# Patient Record
Sex: FEMALE | Race: WHITE | URBAN - METROPOLITAN AREA
[De-identification: names, ages, dates, MRNs, and addresses within clinical notes are randomized per-mention and may not be internally consistent; named-entity substitution may affect disease eponyms.]

---

## 2017-06-15 ENCOUNTER — INPATIENT (INPATIENT)
Facility: HOSPITAL | Age: 22
LOS: 19 days | Discharge: HOME | End: 2017-07-05
Attending: INTERNAL MEDICINE

## 2017-06-15 DIAGNOSIS — F13.10 SEDATIVE, HYPNOTIC OR ANXIOLYTIC ABUSE, UNCOMPLICATED: ICD-10-CM

## 2017-06-15 DIAGNOSIS — F11.20 OPIOID DEPENDENCE, UNCOMPLICATED: ICD-10-CM

## 2017-06-15 DIAGNOSIS — F41.0 PANIC DISORDER [EPISODIC PAROXYSMAL ANXIETY]: ICD-10-CM

## 2017-06-15 DIAGNOSIS — F41.9 ANXIETY DISORDER, UNSPECIFIED: ICD-10-CM

## 2017-06-15 DIAGNOSIS — F12.10 CANNABIS ABUSE, UNCOMPLICATED: ICD-10-CM

## 2017-06-15 DIAGNOSIS — F93.0 SEPARATION ANXIETY DISORDER OF CHILDHOOD: ICD-10-CM

## 2017-06-28 DIAGNOSIS — F41.1 GENERALIZED ANXIETY DISORDER: ICD-10-CM

## 2017-06-28 DIAGNOSIS — F11.20 OPIOID DEPENDENCE, UNCOMPLICATED: ICD-10-CM

## 2017-06-28 DIAGNOSIS — F33.2 MAJOR DEPRESSIVE DISORDER, RECURRENT SEVERE WITHOUT PSYCHOTIC FEATURES: ICD-10-CM

## 2017-06-28 DIAGNOSIS — F17.200 NICOTINE DEPENDENCE, UNSPECIFIED, UNCOMPLICATED: ICD-10-CM

## 2017-06-28 DIAGNOSIS — F14.20 COCAINE DEPENDENCE, UNCOMPLICATED: ICD-10-CM

## 2017-07-12 DIAGNOSIS — F11.20 OPIOID DEPENDENCE, UNCOMPLICATED: ICD-10-CM

## 2017-07-12 DIAGNOSIS — F14.20 COCAINE DEPENDENCE, UNCOMPLICATED: ICD-10-CM

## 2017-07-12 DIAGNOSIS — R51 HEADACHE: ICD-10-CM

## 2017-07-12 DIAGNOSIS — F17.200 NICOTINE DEPENDENCE, UNSPECIFIED, UNCOMPLICATED: ICD-10-CM

## 2017-07-12 DIAGNOSIS — F41.1 GENERALIZED ANXIETY DISORDER: ICD-10-CM

## 2017-07-12 DIAGNOSIS — F33.2 MAJOR DEPRESSIVE DISORDER, RECURRENT SEVERE WITHOUT PSYCHOTIC FEATURES: ICD-10-CM

## 2017-07-12 DIAGNOSIS — R45.851 SUICIDAL IDEATIONS: ICD-10-CM

## 2017-07-12 DIAGNOSIS — Z51.89 ENCOUNTER FOR OTHER SPECIFIED AFTERCARE: ICD-10-CM

## 2017-07-26 ENCOUNTER — OUTPATIENT (OUTPATIENT)
Dept: OUTPATIENT SERVICES | Facility: HOSPITAL | Age: 22
LOS: 1 days | Discharge: HOME | End: 2017-07-26

## 2017-07-26 DIAGNOSIS — F41.9 ANXIETY DISORDER, UNSPECIFIED: ICD-10-CM

## 2017-07-26 DIAGNOSIS — F11.20 OPIOID DEPENDENCE, UNCOMPLICATED: ICD-10-CM

## 2017-07-26 DIAGNOSIS — F93.0 SEPARATION ANXIETY DISORDER OF CHILDHOOD: ICD-10-CM

## 2017-07-26 DIAGNOSIS — F41.0 PANIC DISORDER [EPISODIC PAROXYSMAL ANXIETY]: ICD-10-CM

## 2017-07-26 DIAGNOSIS — F12.10 CANNABIS ABUSE, UNCOMPLICATED: ICD-10-CM

## 2017-07-26 DIAGNOSIS — I49.9 CARDIAC ARRHYTHMIA, UNSPECIFIED: ICD-10-CM

## 2017-07-26 DIAGNOSIS — F13.10 SEDATIVE, HYPNOTIC OR ANXIOLYTIC ABUSE, UNCOMPLICATED: ICD-10-CM

## 2017-08-14 ENCOUNTER — EMERGENCY (EMERGENCY)
Facility: HOSPITAL | Age: 22
LOS: 0 days | Discharge: HOME | End: 2017-08-14
Admitting: PEDIATRICS
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
## 2017-08-14 DIAGNOSIS — F41.0 PANIC DISORDER [EPISODIC PAROXYSMAL ANXIETY]: ICD-10-CM

## 2017-08-14 DIAGNOSIS — Z79.899 OTHER LONG TERM (CURRENT) DRUG THERAPY: ICD-10-CM

## 2017-08-14 DIAGNOSIS — F12.10 CANNABIS ABUSE, UNCOMPLICATED: ICD-10-CM

## 2017-08-14 DIAGNOSIS — F13.10 SEDATIVE, HYPNOTIC OR ANXIOLYTIC ABUSE, UNCOMPLICATED: ICD-10-CM
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
## 2017-08-14 DIAGNOSIS — F11.20 OPIOID DEPENDENCE, UNCOMPLICATED: ICD-10-CM

## 2017-08-14 DIAGNOSIS — F41.9 ANXIETY DISORDER, UNSPECIFIED: ICD-10-CM

## 2017-08-14 DIAGNOSIS — F93.0 SEPARATION ANXIETY DISORDER OF CHILDHOOD: ICD-10-CM

## 2017-08-14 DIAGNOSIS — S61.212A LACERATION WITHOUT FOREIGN BODY OF RIGHT MIDDLE FINGER WITHOUT DAMAGE TO NAIL, INITIAL ENCOUNTER: ICD-10-CM

## 2017-08-14 DIAGNOSIS — Z23 ENCOUNTER FOR IMMUNIZATION: ICD-10-CM

## 2017-09-14 ENCOUNTER — OUTPATIENT (OUTPATIENT)
Dept: OUTPATIENT SERVICES | Facility: HOSPITAL | Age: 22
LOS: 1 days | Discharge: HOME | End: 2017-09-14

## 2017-09-14 DIAGNOSIS — I49.9 CARDIAC ARRHYTHMIA, UNSPECIFIED: ICD-10-CM

## 2017-09-14 DIAGNOSIS — F12.10 CANNABIS ABUSE, UNCOMPLICATED: ICD-10-CM

## 2017-09-14 DIAGNOSIS — F41.9 ANXIETY DISORDER, UNSPECIFIED: ICD-10-CM

## 2017-09-14 DIAGNOSIS — F93.0 SEPARATION ANXIETY DISORDER OF CHILDHOOD: ICD-10-CM

## 2017-09-14 DIAGNOSIS — F11.20 OPIOID DEPENDENCE, UNCOMPLICATED: ICD-10-CM

## 2017-09-14 DIAGNOSIS — F13.10 SEDATIVE, HYPNOTIC OR ANXIOLYTIC ABUSE, UNCOMPLICATED: ICD-10-CM

## 2017-09-14 DIAGNOSIS — F41.0 PANIC DISORDER [EPISODIC PAROXYSMAL ANXIETY]: ICD-10-CM

## 2017-09-19 ENCOUNTER — OUTPATIENT (OUTPATIENT)
Dept: OUTPATIENT SERVICES | Facility: HOSPITAL | Age: 22
LOS: 1 days | Discharge: HOME | End: 2017-09-19

## 2017-09-19 DIAGNOSIS — F93.0 SEPARATION ANXIETY DISORDER OF CHILDHOOD: ICD-10-CM

## 2017-09-19 DIAGNOSIS — I49.9 CARDIAC ARRHYTHMIA, UNSPECIFIED: ICD-10-CM

## 2017-09-19 DIAGNOSIS — F41.0 PANIC DISORDER [EPISODIC PAROXYSMAL ANXIETY]: ICD-10-CM

## 2017-09-19 DIAGNOSIS — F12.10 CANNABIS ABUSE, UNCOMPLICATED: ICD-10-CM

## 2017-09-19 DIAGNOSIS — F41.9 ANXIETY DISORDER, UNSPECIFIED: ICD-10-CM

## 2017-09-19 DIAGNOSIS — F13.10 SEDATIVE, HYPNOTIC OR ANXIOLYTIC ABUSE, UNCOMPLICATED: ICD-10-CM

## 2017-09-19 DIAGNOSIS — F11.20 OPIOID DEPENDENCE, UNCOMPLICATED: ICD-10-CM

## 2017-12-22 ENCOUNTER — EMERGENCY (EMERGENCY)
Facility: HOSPITAL | Age: 22
LOS: 0 days | Discharge: HOME | End: 2017-12-22
Admitting: PEDIATRICS

## 2017-12-22 DIAGNOSIS — F93.0 SEPARATION ANXIETY DISORDER OF CHILDHOOD: ICD-10-CM

## 2017-12-22 DIAGNOSIS — F41.0 PANIC DISORDER [EPISODIC PAROXYSMAL ANXIETY]: ICD-10-CM

## 2017-12-22 DIAGNOSIS — L02.414 CUTANEOUS ABSCESS OF LEFT UPPER LIMB: ICD-10-CM

## 2017-12-22 DIAGNOSIS — F13.10 SEDATIVE, HYPNOTIC OR ANXIOLYTIC ABUSE, UNCOMPLICATED: ICD-10-CM

## 2017-12-22 DIAGNOSIS — F41.9 ANXIETY DISORDER, UNSPECIFIED: ICD-10-CM

## 2017-12-22 DIAGNOSIS — F11.20 OPIOID DEPENDENCE, UNCOMPLICATED: ICD-10-CM

## 2017-12-22 DIAGNOSIS — M79.89 OTHER SPECIFIED SOFT TISSUE DISORDERS: ICD-10-CM

## 2017-12-22 DIAGNOSIS — F12.10 CANNABIS ABUSE, UNCOMPLICATED: ICD-10-CM

## 2018-01-10 ENCOUNTER — OUTPATIENT (OUTPATIENT)
Dept: OUTPATIENT SERVICES | Facility: HOSPITAL | Age: 23
LOS: 1 days | Discharge: HOME | End: 2018-01-10

## 2018-01-10 DIAGNOSIS — F41.9 ANXIETY DISORDER, UNSPECIFIED: ICD-10-CM

## 2018-01-10 DIAGNOSIS — Z00.8 ENCOUNTER FOR OTHER GENERAL EXAMINATION: ICD-10-CM

## 2018-01-10 DIAGNOSIS — F93.0 SEPARATION ANXIETY DISORDER OF CHILDHOOD: ICD-10-CM

## 2018-01-10 DIAGNOSIS — F11.20 OPIOID DEPENDENCE, UNCOMPLICATED: ICD-10-CM

## 2018-01-10 DIAGNOSIS — F41.0 PANIC DISORDER [EPISODIC PAROXYSMAL ANXIETY]: ICD-10-CM

## 2018-01-10 DIAGNOSIS — F13.10 SEDATIVE, HYPNOTIC OR ANXIOLYTIC ABUSE, UNCOMPLICATED: ICD-10-CM

## 2018-01-10 DIAGNOSIS — F12.10 CANNABIS ABUSE, UNCOMPLICATED: ICD-10-CM

## 2018-02-09 ENCOUNTER — INPATIENT (INPATIENT)
Facility: HOSPITAL | Age: 23
LOS: 4 days | Discharge: HOME | End: 2018-02-14
Attending: INTERNAL MEDICINE

## 2018-02-09 VITALS
HEART RATE: 99 BPM | RESPIRATION RATE: 16 BRPM | DIASTOLIC BLOOD PRESSURE: 61 MMHG | TEMPERATURE: 97 F | HEIGHT: 62 IN | SYSTOLIC BLOOD PRESSURE: 92 MMHG | WEIGHT: 125 LBS

## 2018-02-09 DIAGNOSIS — F13.20 SEDATIVE, HYPNOTIC OR ANXIOLYTIC DEPENDENCE, UNCOMPLICATED: ICD-10-CM

## 2018-02-09 DIAGNOSIS — F41.1 GENERALIZED ANXIETY DISORDER: ICD-10-CM

## 2018-02-09 DIAGNOSIS — F17.200 NICOTINE DEPENDENCE, UNSPECIFIED, UNCOMPLICATED: ICD-10-CM

## 2018-02-09 DIAGNOSIS — F13.10 SEDATIVE, HYPNOTIC OR ANXIOLYTIC ABUSE, UNCOMPLICATED: ICD-10-CM

## 2018-02-09 DIAGNOSIS — F19.94 OTHER PSYCHOACTIVE SUBSTANCE USE, UNSPECIFIED WITH PSYCHOACTIVE SUBSTANCE-INDUCED MOOD DISORDER: ICD-10-CM

## 2018-02-09 DIAGNOSIS — F11.20 OPIOID DEPENDENCE, UNCOMPLICATED: ICD-10-CM

## 2018-02-09 LAB
ALBUMIN SERPL ELPH-MCNC: 4.6 G/DL — SIGNIFICANT CHANGE UP (ref 3–5.5)
ALP SERPL-CCNC: 80 U/L — SIGNIFICANT CHANGE UP (ref 30–115)
ALT FLD-CCNC: 14 U/L — SIGNIFICANT CHANGE UP (ref 14–37)
ANION GAP SERPL CALC-SCNC: 9 MMOL/L — SIGNIFICANT CHANGE UP (ref 7–14)
APPEARANCE UR: (no result)
AST SERPL-CCNC: 16 U/L — SIGNIFICANT CHANGE UP (ref 14–37)
BACTERIA # UR AUTO: (no result)
BASOPHILS # BLD AUTO: 0.04 K/UL — SIGNIFICANT CHANGE UP (ref 0–0.2)
BASOPHILS NFR BLD AUTO: 0.6 % — SIGNIFICANT CHANGE UP (ref 0–1)
BILIRUB SERPL-MCNC: 0.6 MG/DL — SIGNIFICANT CHANGE UP (ref 0.2–1.2)
BILIRUB UR-MCNC: (no result)
BUN SERPL-MCNC: 10 MG/DL — SIGNIFICANT CHANGE UP (ref 10–20)
CALCIUM SERPL-MCNC: 9.5 MG/DL — SIGNIFICANT CHANGE UP (ref 8.5–10.1)
CHLORIDE SERPL-SCNC: 100 MMOL/L — SIGNIFICANT CHANGE UP (ref 98–110)
CHOLEST SERPL-MCNC: 178 MG/DL — SIGNIFICANT CHANGE UP (ref 95–200)
CO2 SERPL-SCNC: 25 MMOL/L — SIGNIFICANT CHANGE UP (ref 17–32)
COLOR SPEC: YELLOW — SIGNIFICANT CHANGE UP
CREAT SERPL-MCNC: 0.7 MG/DL — SIGNIFICANT CHANGE UP (ref 0.7–1.5)
DIFF PNL FLD: (no result)
EOSINOPHIL # BLD AUTO: 0.02 K/UL — SIGNIFICANT CHANGE UP (ref 0–0.7)
EOSINOPHIL NFR BLD AUTO: 0.3 % — SIGNIFICANT CHANGE UP (ref 0–8)
EPI CELLS # UR: (no result) /HPF
GLUCOSE SERPL-MCNC: 102 MG/DL — SIGNIFICANT CHANGE UP (ref 70–110)
GLUCOSE UR QL: NEGATIVE — SIGNIFICANT CHANGE UP
HCG UR QL: NEGATIVE — SIGNIFICANT CHANGE UP
HCT VFR BLD CALC: 42.4 % — SIGNIFICANT CHANGE UP (ref 37–47)
HDLC SERPL-MCNC: 39 MG/DL — LOW (ref 40–60)
HGB BLD-MCNC: 14 G/DL — SIGNIFICANT CHANGE UP (ref 14–18)
IMM GRANULOCYTES NFR BLD AUTO: 0.3 % — SIGNIFICANT CHANGE UP (ref 0.1–0.3)
KETONES UR-MCNC: 40 — SIGNIFICANT CHANGE UP
LEUKOCYTE ESTERASE UR-ACNC: (no result)
LIPID PNL WITH DIRECT LDL SERPL: 119 MG/DL — HIGH (ref 50–100)
LYMPHOCYTES # BLD AUTO: 1.29 K/UL — SIGNIFICANT CHANGE UP (ref 1.2–3.4)
LYMPHOCYTES # BLD AUTO: 18.1 % — LOW (ref 20.5–51.1)
MAGNESIUM SERPL-MCNC: 2.1 MG/DL — SIGNIFICANT CHANGE UP (ref 1.8–2.4)
MCHC RBC-ENTMCNC: 28.6 PG — SIGNIFICANT CHANGE UP (ref 27–31)
MCHC RBC-ENTMCNC: 33 G/DL — SIGNIFICANT CHANGE UP (ref 32–37)
MCV RBC AUTO: 86.7 FL — SIGNIFICANT CHANGE UP (ref 81–91)
MONOCYTES # BLD AUTO: 0.43 K/UL — SIGNIFICANT CHANGE UP (ref 0.1–0.6)
MONOCYTES NFR BLD AUTO: 6 % — SIGNIFICANT CHANGE UP (ref 1.7–9.3)
NEUTROPHILS # BLD AUTO: 5.31 K/UL — SIGNIFICANT CHANGE UP (ref 1.4–6.5)
NEUTROPHILS NFR BLD AUTO: 74.7 % — SIGNIFICANT CHANGE UP (ref 42.2–75.2)
NITRITE UR-MCNC: NEGATIVE — SIGNIFICANT CHANGE UP
NRBC # BLD: 0 /100 WBCS — SIGNIFICANT CHANGE UP (ref 0–0)
PH UR: 6 — SIGNIFICANT CHANGE UP (ref 5–8)
PLATELET # BLD AUTO: 332 K/UL — SIGNIFICANT CHANGE UP (ref 130–400)
POTASSIUM SERPL-MCNC: 4.4 MMOL/L — SIGNIFICANT CHANGE UP (ref 3.5–5)
POTASSIUM SERPL-SCNC: 4.4 MMOL/L — SIGNIFICANT CHANGE UP (ref 3.5–5)
PROT SERPL-MCNC: 7.7 G/DL — SIGNIFICANT CHANGE UP (ref 6–8)
PROT UR-MCNC: 30
RBC # BLD: 4.89 M/UL — SIGNIFICANT CHANGE UP (ref 4.2–5.4)
RBC # FLD: 12.4 % — SIGNIFICANT CHANGE UP (ref 11.5–14.5)
RBC CASTS # UR COMP ASSIST: (no result) /HPF
SODIUM SERPL-SCNC: 134 MMOL/L — LOW (ref 135–146)
SP GR SPEC: >=1.03 (ref 1.01–1.03)
TOTAL CHOLESTEROL/HDL RATIO MEASUREMENT: 4.6 RATIO — SIGNIFICANT CHANGE UP (ref 4–5.5)
TRIGL SERPL-MCNC: 38 MG/DL — LOW (ref 40–150)
UROBILINOGEN FLD QL: 1 (ref 0.2–0.2)
WBC # BLD: 7.11 K/UL — SIGNIFICANT CHANGE UP (ref 4.8–10.8)
WBC # FLD AUTO: 7.11 K/UL — SIGNIFICANT CHANGE UP (ref 4.8–10.8)
WBC UR QL: (no result) /HPF

## 2018-02-09 RX ORDER — HYDROXYZINE HCL 10 MG
50 TABLET ORAL EVERY 6 HOURS
Qty: 0 | Refills: 0 | Status: DISCONTINUED | OUTPATIENT
Start: 2018-02-09 | End: 2018-02-14

## 2018-02-09 RX ORDER — ACETAMINOPHEN 500 MG
650 TABLET ORAL EVERY 6 HOURS
Qty: 0 | Refills: 0 | Status: DISCONTINUED | OUTPATIENT
Start: 2018-02-09 | End: 2018-02-14

## 2018-02-09 RX ORDER — IBUPROFEN 200 MG
400 TABLET ORAL EVERY 6 HOURS
Qty: 0 | Refills: 0 | Status: DISCONTINUED | OUTPATIENT
Start: 2018-02-09 | End: 2018-02-14

## 2018-02-09 RX ORDER — NICOTINE POLACRILEX 2 MG
1 GUM BUCCAL DAILY
Qty: 0 | Refills: 0 | Status: DISCONTINUED | OUTPATIENT
Start: 2018-02-09 | End: 2018-02-14

## 2018-02-09 RX ORDER — MAGNESIUM HYDROXIDE 400 MG/1
30 TABLET, CHEWABLE ORAL EVERY 6 HOURS
Qty: 0 | Refills: 0 | Status: DISCONTINUED | OUTPATIENT
Start: 2018-02-09 | End: 2018-02-14

## 2018-02-09 RX ORDER — NICOTINE POLACRILEX 2 MG
1 GUM BUCCAL ONCE
Qty: 0 | Refills: 0 | Status: DISCONTINUED | OUTPATIENT
Start: 2018-02-09 | End: 2018-02-14

## 2018-02-09 RX ORDER — PHENOBARBITAL 60 MG
32.4 TABLET ORAL EVERY 6 HOURS
Qty: 0 | Refills: 0 | Status: DISCONTINUED | OUTPATIENT
Start: 2018-02-09 | End: 2018-02-10

## 2018-02-09 RX ORDER — HYDROXYZINE HCL 10 MG
100 TABLET ORAL AT BEDTIME
Qty: 0 | Refills: 0 | Status: DISCONTINUED | OUTPATIENT
Start: 2018-02-09 | End: 2018-02-14

## 2018-02-09 RX ORDER — METHADONE HYDROCHLORIDE 40 MG/1
60 TABLET ORAL
Qty: 0 | Refills: 0 | COMMUNITY

## 2018-02-09 RX ORDER — METHADONE HYDROCHLORIDE 40 MG/1
60 TABLET ORAL DAILY
Qty: 0 | Refills: 0 | Status: DISCONTINUED | OUTPATIENT
Start: 2018-02-10 | End: 2018-02-14

## 2018-02-09 RX ORDER — METHADONE HYDROCHLORIDE 40 MG/1
60 TABLET ORAL ONCE
Qty: 0 | Refills: 0 | Status: DISCONTINUED | OUTPATIENT
Start: 2018-02-09 | End: 2018-02-09

## 2018-02-09 RX ORDER — METHADONE HYDROCHLORIDE 40 MG/1
60 TABLET ORAL DAILY
Qty: 0 | Refills: 0 | Status: DISCONTINUED | OUTPATIENT
Start: 2018-02-09 | End: 2018-02-09

## 2018-02-09 RX ADMIN — Medication 1 TABLET(S): at 15:11

## 2018-02-09 RX ADMIN — METHADONE HYDROCHLORIDE 60 MILLIGRAM(S): 40 TABLET ORAL at 15:11

## 2018-02-09 RX ADMIN — Medication 32.4 MILLIGRAM(S): at 15:10

## 2018-02-09 RX ADMIN — Medication 32.4 MILLIGRAM(S): at 23:52

## 2018-02-09 RX ADMIN — Medication 32.4 MILLIGRAM(S): at 18:03

## 2018-02-09 RX ADMIN — Medication 1 PATCH: at 20:16

## 2018-02-09 NOTE — BEHAVIORAL HEALTH ASSESSMENT NOTE - DESCRIPTION (FIRST USE, LAST USE, QUANTITY, FREQUENCY, DURATION)
1/2 ppd history of, denies current use, on methadone. Had 1 accidental overdose 1 year ago currently taking 5mg of xanax daily. no history of seizures

## 2018-02-09 NOTE — BEHAVIORAL HEALTH ASSESSMENT NOTE - SUMMARY
22 Y female with benzodiazepine use disorder, with history of suicidal gestures as a teenager, no prior IPP admissions,  consulted to psychiatry after she had a positive [place] suicide scale, answering yes to the question of previous attempts. Patient denies current suicidal ideations or plans. She is future oriented, has strong support system, is engaged in treatment with OPD psychiatrist, has responsibilities toward others (her son) and identifies reasons for living. She is not depressed, psychotic or manic at this time.

## 2018-02-09 NOTE — BEHAVIORAL HEALTH ASSESSMENT NOTE - HPI (INCLUDE ILLNESS QUALITY, SEVERITY, DURATION, TIMING, CONTEXT, MODIFYING FACTORS, ASSOCIATED SIGNS AND SYMPTOMS)
22 Y , engaged, domiciled, unemployed female with history of opiate use disorder, benzodiazepine use disorder being evaluated by psychiatry for a positive [place] Suicide scale. Patient reported a history of suicide attempts on the scale but denied current ideations or plans. She is currently enrolled in the Methadone program and follows up with Dr. Gregg. She endorses anxiety at baseline but denies depressed mood and neurovegative signs of depression. She has no history of psychosis or cary. Patient made suicidal gestures as a teenager in the foster care system, most recently  in 2014, when she attempted to cut herself, however scars are small and appear to be from superficial wounds.    Patient is presently struggling with benzodiazepine use disorder. She has history of heroin use disorder however she is currently on methadone and not using heroin. She is future oriented, has a support system, identifies reasons for living (her son).

## 2018-02-09 NOTE — BEHAVIORAL HEALTH ASSESSMENT NOTE - RISK ASSESSMENT
Patient's risk is elevated due to her substance use however is it mitigated by her willingness to comply with treatment, lack of SI, future orientation, strong support system.

## 2018-02-09 NOTE — BEHAVIORAL HEALTH ASSESSMENT NOTE - NSBHSUICPROTECTFACT_PSY_A_CORE
Identifies reasons for living/Responsibility to family and others/Future oriented/Supportive social network or family

## 2018-02-10 LAB
HBV CORE AB SER-ACNC: SIGNIFICANT CHANGE UP
HBV SURFACE AB SER-ACNC: REACTIVE
HBV SURFACE AG SER-ACNC: SIGNIFICANT CHANGE UP
HCV AB S/CO SERPL IA: 0.19 S/CO — SIGNIFICANT CHANGE UP
HCV AB SERPL-IMP: SIGNIFICANT CHANGE UP
HIV 1+2 AB+HIV1 P24 AG SERPL QL IA: SIGNIFICANT CHANGE UP
T PALLIDUM AB TITR SER: NEGATIVE — SIGNIFICANT CHANGE UP

## 2018-02-10 RX ORDER — PHENOBARBITAL 60 MG
TABLET ORAL
Qty: 0 | Refills: 0 | Status: COMPLETED | OUTPATIENT
Start: 2018-02-10 | End: 2018-02-14

## 2018-02-10 RX ORDER — PHENOBARBITAL 60 MG
32.4 TABLET ORAL ONCE
Qty: 0 | Refills: 0 | Status: DISCONTINUED | OUTPATIENT
Start: 2018-02-10 | End: 2018-02-10

## 2018-02-10 RX ORDER — PHENOBARBITAL 60 MG
32.4 TABLET ORAL EVERY 6 HOURS
Qty: 0 | Refills: 0 | Status: DISCONTINUED | OUTPATIENT
Start: 2018-02-10 | End: 2018-02-12

## 2018-02-10 RX ORDER — PHENOBARBITAL 60 MG
32.4 TABLET ORAL EVERY 12 HOURS
Qty: 0 | Refills: 0 | Status: DISCONTINUED | OUTPATIENT
Start: 2018-02-13 | End: 2018-02-14

## 2018-02-10 RX ORDER — PHENOBARBITAL 60 MG
32.4 TABLET ORAL EVERY 6 HOURS
Qty: 0 | Refills: 0 | Status: DISCONTINUED | OUTPATIENT
Start: 2018-02-12 | End: 2018-02-12

## 2018-02-10 RX ADMIN — Medication 400 MILLIGRAM(S): at 09:13

## 2018-02-10 RX ADMIN — Medication 1 TABLET(S): at 09:14

## 2018-02-10 RX ADMIN — Medication 400 MILLIGRAM(S): at 09:15

## 2018-02-10 RX ADMIN — Medication 32.4 MILLIGRAM(S): at 23:57

## 2018-02-10 RX ADMIN — Medication 100 MILLIGRAM(S): at 23:57

## 2018-02-10 RX ADMIN — Medication 1 PATCH: at 09:14

## 2018-02-10 RX ADMIN — Medication 32.4 MILLIGRAM(S): at 12:11

## 2018-02-10 RX ADMIN — METHADONE HYDROCHLORIDE 60 MILLIGRAM(S): 40 TABLET ORAL at 05:58

## 2018-02-10 RX ADMIN — Medication 100 MILLIGRAM(S): at 01:10

## 2018-02-10 RX ADMIN — Medication 1 PATCH: at 21:30

## 2018-02-10 RX ADMIN — Medication 32.4 MILLIGRAM(S): at 05:58

## 2018-02-10 RX ADMIN — Medication 32.4 MILLIGRAM(S): at 17:32

## 2018-02-11 LAB
ESTIMATED AVERAGE GLUCOSE: 97 MG/DL — SIGNIFICANT CHANGE UP (ref 68–114)
HBA1C BLD-MCNC: 5 % — SIGNIFICANT CHANGE UP (ref 4–5.6)
M TB TUBERC IFN-G BLD QL: 0 IU/ML — SIGNIFICANT CHANGE UP
M TB TUBERC IFN-G BLD QL: 0.04 IU/ML — SIGNIFICANT CHANGE UP
M TB TUBERC IFN-G BLD QL: NEGATIVE — SIGNIFICANT CHANGE UP
MITOGEN IGNF BCKGRD COR BLD-ACNC: >10 IU/ML — SIGNIFICANT CHANGE UP

## 2018-02-11 RX ADMIN — Medication 1 PATCH: at 09:06

## 2018-02-11 RX ADMIN — Medication 1 TABLET(S): at 09:06

## 2018-02-11 RX ADMIN — Medication 500 MILLIGRAM(S): at 12:08

## 2018-02-11 RX ADMIN — Medication 500 MILLIGRAM(S): at 21:13

## 2018-02-11 RX ADMIN — Medication 400 MILLIGRAM(S): at 09:09

## 2018-02-11 RX ADMIN — METHADONE HYDROCHLORIDE 60 MILLIGRAM(S): 40 TABLET ORAL at 06:27

## 2018-02-11 RX ADMIN — Medication 500 MILLIGRAM(S): at 09:13

## 2018-02-11 RX ADMIN — Medication 32.4 MILLIGRAM(S): at 06:34

## 2018-02-11 RX ADMIN — Medication 400 MILLIGRAM(S): at 09:07

## 2018-02-11 RX ADMIN — Medication 32.4 MILLIGRAM(S): at 17:37

## 2018-02-11 RX ADMIN — Medication 32.4 MILLIGRAM(S): at 12:19

## 2018-02-11 RX ADMIN — Medication 1 PATCH: at 09:08

## 2018-02-12 LAB
AMPHET UR-MCNC: NEGATIVE — SIGNIFICANT CHANGE UP
BARBITURATES UR SCN-MCNC: POSITIVE
BENZODIAZ UR-MCNC: POSITIVE
COCAINE METAB.OTHER UR-MCNC: POSITIVE
METHADONE UR-MCNC: POSITIVE
OPIATES UR-MCNC: POSITIVE
PCP SPEC-MCNC: SIGNIFICANT CHANGE UP
PROPOXYPHENE QUALITATIVE URINE RESULT: NEGATIVE — SIGNIFICANT CHANGE UP

## 2018-02-12 RX ADMIN — Medication 500 MILLIGRAM(S): at 00:09

## 2018-02-12 RX ADMIN — Medication 500 MILLIGRAM(S): at 09:07

## 2018-02-12 RX ADMIN — Medication 1 PATCH: at 09:09

## 2018-02-12 RX ADMIN — Medication 500 MILLIGRAM(S): at 09:19

## 2018-02-12 RX ADMIN — METHADONE HYDROCHLORIDE 60 MILLIGRAM(S): 40 TABLET ORAL at 06:18

## 2018-02-12 RX ADMIN — Medication 32.4 MILLIGRAM(S): at 11:52

## 2018-02-12 RX ADMIN — Medication 1 TABLET(S): at 09:08

## 2018-02-12 RX ADMIN — Medication 32.4 MILLIGRAM(S): at 00:06

## 2018-02-12 RX ADMIN — Medication 32.4 MILLIGRAM(S): at 18:37

## 2018-02-12 RX ADMIN — Medication 32.4 MILLIGRAM(S): at 06:17

## 2018-02-12 RX ADMIN — Medication 500 MILLIGRAM(S): at 21:27

## 2018-02-12 RX ADMIN — Medication 1 PATCH: at 09:07

## 2018-02-12 NOTE — CHART NOTE - NSCHARTNOTEFT_GEN_A_CORE
Allergies:  No Known Allergies      Diet: Regular    Activity: as tolerated    Follow up with    1. PMD in 2 weeks        Follow up for abnormal labs/tests    1. low sodium    Extra Instructions:      Flu Vaccine given  Yes_____         No______      Diagnosis:  Chemical Dependency   Maintain sobriety  refrain from all use      Patient Signature___________________________________________  Date_________________      Nurse Signature_____________________________________________Date_________________

## 2018-02-13 RX ADMIN — Medication 1 PATCH: at 08:59

## 2018-02-13 RX ADMIN — Medication 32.4 MILLIGRAM(S): at 08:59

## 2018-02-13 RX ADMIN — Medication 1 PATCH: at 09:00

## 2018-02-13 RX ADMIN — Medication 1 TABLET(S): at 09:00

## 2018-02-13 RX ADMIN — Medication 32.4 MILLIGRAM(S): at 21:12

## 2018-02-13 RX ADMIN — METHADONE HYDROCHLORIDE 60 MILLIGRAM(S): 40 TABLET ORAL at 05:55

## 2018-02-14 VITALS
SYSTOLIC BLOOD PRESSURE: 98 MMHG | TEMPERATURE: 97 F | RESPIRATION RATE: 14 BRPM | DIASTOLIC BLOOD PRESSURE: 63 MMHG | HEART RATE: 65 BPM

## 2018-02-14 RX ADMIN — METHADONE HYDROCHLORIDE 60 MILLIGRAM(S): 40 TABLET ORAL at 06:27

## 2018-02-14 RX ADMIN — Medication 100 MILLIGRAM(S): at 00:38

## 2018-02-14 RX ADMIN — Medication 1 PATCH: at 07:57

## 2018-02-14 RX ADMIN — Medication 32.4 MILLIGRAM(S): at 09:50

## 2018-02-14 RX ADMIN — Medication 1 TABLET(S): at 09:51

## 2018-02-16 DIAGNOSIS — F11.20 OPIOID DEPENDENCE, UNCOMPLICATED: ICD-10-CM

## 2018-02-16 DIAGNOSIS — F17.210 NICOTINE DEPENDENCE, CIGARETTES, UNCOMPLICATED: ICD-10-CM

## 2018-02-16 DIAGNOSIS — F41.9 ANXIETY DISORDER, UNSPECIFIED: ICD-10-CM

## 2018-02-16 DIAGNOSIS — F32.9 MAJOR DEPRESSIVE DISORDER, SINGLE EPISODE, UNSPECIFIED: ICD-10-CM

## 2018-02-16 DIAGNOSIS — F13.20 SEDATIVE, HYPNOTIC OR ANXIOLYTIC DEPENDENCE, UNCOMPLICATED: ICD-10-CM

## 2018-03-04 ENCOUNTER — EMERGENCY (EMERGENCY)
Facility: HOSPITAL | Age: 23
LOS: 0 days | Discharge: HOME | End: 2018-03-04
Attending: STUDENT IN AN ORGANIZED HEALTH CARE EDUCATION/TRAINING PROGRAM

## 2018-03-04 VITALS
SYSTOLIC BLOOD PRESSURE: 107 MMHG | HEART RATE: 72 BPM | TEMPERATURE: 98 F | RESPIRATION RATE: 18 BRPM | OXYGEN SATURATION: 97 % | DIASTOLIC BLOOD PRESSURE: 71 MMHG

## 2018-03-04 DIAGNOSIS — Z79.1 LONG TERM (CURRENT) USE OF NON-STEROIDAL ANTI-INFLAMMATORIES (NSAID): ICD-10-CM

## 2018-03-04 DIAGNOSIS — K08.89 OTHER SPECIFIED DISORDERS OF TEETH AND SUPPORTING STRUCTURES: ICD-10-CM

## 2018-03-04 DIAGNOSIS — Z79.899 OTHER LONG TERM (CURRENT) DRUG THERAPY: ICD-10-CM

## 2018-03-04 NOTE — ED PROVIDER NOTE - PHYSICAL EXAMINATION
Physical Exam    Vital Signs: I have reviewed the initial vital signs.  Constitutional: well-nourished, appears stated age, no acute distress  HEENT: +Pain to left upper last tooth  Neuro: AOx3, Motor 5/5, Sensation: equal and intact

## 2018-03-04 NOTE — ED PROVIDER NOTE - MEDICAL DECISION MAKING DETAILS
32yo preg F about 12 weeks p/w int SOB x about 2 weeks, just prior had URI resolved. No leg swelling, h/o VTE, fever, cough. SOB worse with exertion and sometimes with lying down, + int chest discomfort now resolved. PE: CTAB, RRR, no pedal edema, Impression: viral illness vs. pericarditis? VTE unlikely. Plan: labs, d-dimer, LE Doppler, CXR, EKG, reassess. I personally evaluated the patient. I reviewed the Resident’s or Physician Assistant’s note (as assigned above), and agree with the findings and plan except as documented in my note. 21yo F p/w left upper molar dental pain x past few days. Plans to have root canal this Thursday but here because of pain, no fever, trismus or trauma. PE as noted. Plan: dental block, analgesia and d/c home

## 2018-03-04 NOTE — ED PROVIDER NOTE - OBJECTIVE STATEMENT
23 yo female c/o left upper dental pain. She saw her dentist this week and is scheduled for a root canal next week. Took regular strength Tylenols at 9am  without improvement. No fever.

## 2018-03-04 NOTE — ED ADULT NURSE NOTE - INV PAIN INTERVENTIONS-NUMBER SCALE
Pt took "ten regular Tylenol at 9 a.m."; Ibuprofen thereafter with no relief/multiple medication modalities

## 2018-03-07 NOTE — ED PROCEDURE NOTE - CPROC ED POST PROC CARE GUIDE1
Verbal/written post procedure instructions were given to patient/caregiver./Instructed patient/caregiver to follow-up with primary dentist./Avoid solid food.

## 2018-04-11 ENCOUNTER — EMERGENCY (EMERGENCY)
Facility: HOSPITAL | Age: 23
LOS: 1 days | Discharge: LEFT BEFORE DISCHARGE | End: 2018-04-11
Attending: EMERGENCY MEDICINE

## 2018-04-11 VITALS
DIASTOLIC BLOOD PRESSURE: 72 MMHG | OXYGEN SATURATION: 99 % | WEIGHT: 125 LBS | RESPIRATION RATE: 16 BRPM | HEART RATE: 90 BPM | TEMPERATURE: 98 F | SYSTOLIC BLOOD PRESSURE: 98 MMHG

## 2018-04-11 DIAGNOSIS — Z79.1 LONG TERM (CURRENT) USE OF NON-STEROIDAL ANTI-INFLAMMATORIES (NSAID): ICD-10-CM

## 2018-04-11 DIAGNOSIS — Z79.899 OTHER LONG TERM (CURRENT) DRUG THERAPY: ICD-10-CM

## 2018-04-11 DIAGNOSIS — T54.91XA TOXIC EFFECT OF UNSPECIFIED CORROSIVE SUBSTANCE, ACCIDENTAL (UNINTENTIONAL), INITIAL ENCOUNTER: ICD-10-CM

## 2018-04-11 DIAGNOSIS — F17.200 NICOTINE DEPENDENCE, UNSPECIFIED, UNCOMPLICATED: ICD-10-CM

## 2018-04-11 DIAGNOSIS — Z79.891 LONG TERM (CURRENT) USE OF OPIATE ANALGESIC: ICD-10-CM

## 2018-04-11 NOTE — ED ADULT NURSE NOTE - PMH
Dental disorder  pt awaiting root canal to left upper tooth  Eating disorder  pt states she is bulemic.  IV drug abuse  pt currently taking 70 mg daily from methadone clinic on [address].  Missed today's dose.

## 2018-04-11 NOTE — ED PROVIDER NOTE - ATTENDING CONTRIBUTION TO CARE
23 yo female on methadone, h/o self diagnosed eating disorder and constipation ( LBM 5 days ago, took a laxative today) here for evaluation after accidentally took a small sip of a household bleech she has been using to clean her house with ( missed methadone clinic and to take her mind of of it started cleaning her house).  No vomiting, urinary complaints, no fever, chills. Well-appearing young female, NAD,  nml oral mucosa, nml work of breathing, lungs CTA b/l, mild ttp without rebound ot guarding, ambulating in ED without difficulties.  Will get abdominal x-ray to assess constipation, anticipate d/c home.

## 2018-04-11 NOTE — ED PROVIDER NOTE - OBJECTIVE STATEMENT
rosa is 23 yo female presents to ed for evaluation . patient history of drug abuse and is on methadone program. patient admits that she missed her methadone dose today. patient states while cleaning her house today she accidently drank bleach. patient states she spit most of it out before she swallowed it. patient states this happened around 3. since patient missed her metadone she is in pain and that is why she came to ed . no nausea no vomiting no fever no chills

## 2018-04-11 NOTE — ED ADULT TRIAGE NOTE - CHIEF COMPLAINT QUOTE
pt states " I accidentally drank a mouthful of bleach because I thought it was my coffee cup".  Pt denies suicidal thoughts.

## 2018-04-11 NOTE — ED PROVIDER NOTE - NS ED ROS FT
Review of Systems:  	•	CONSTITUTIONAL - no fever, no diaphoresis, no chills  	•	SKIN - no rash  	•	HEMATOLOGIC - no bleeding, no bruising  	•	EYES - no eye pain, no blurry vision  	•	ENT - no change in hearing, no sore throat, no ear pain or tinnitus  	•	RESPIRATORY - no shortness of breath, no cough  	•	CARDIAC - no chest pain, no palpitations  	•	GI - no abd pain, no nausea, no vomiting, no diarrhea, no constipation  	•	GENITO-URINARY - no discharge, no dysuria; no hematuria, no increased urinary frequency  	•	MUSCULOSKELETAL -generalized body aches, no swelling, no redness  	•	NEUROLOGIC - no weakness, no headache, no paresthesias, no LOC  	•	PSYCH - no anxiety, non suicidal, non homicidal, no hallucination, no depression

## 2018-04-11 NOTE — ED PROVIDER NOTE - PROGRESS NOTE DETAILS
patient is not in her spot  xray tech also could not find patient patient is not in her spot. looked outside and patient is not there. patient left piror to xray

## 2018-04-11 NOTE — ED ADULT NURSE NOTE - NS ED NURSE ELOPE COMMENTS
pt stated she was going outside and would return in a few minutes.  It has been over 1 hour.  Pt never had an IV placed.

## 2018-05-31 ENCOUNTER — OUTPATIENT (OUTPATIENT)
Dept: OUTPATIENT SERVICES | Facility: HOSPITAL | Age: 23
LOS: 1 days | Discharge: HOME | End: 2018-05-31

## 2018-05-31 DIAGNOSIS — Z00.8 ENCOUNTER FOR OTHER GENERAL EXAMINATION: ICD-10-CM

## 2018-05-31 LAB
ALBUMIN SERPL ELPH-MCNC: 4.3 G/DL — SIGNIFICANT CHANGE UP (ref 3.5–5.2)
ALP SERPL-CCNC: 91 U/L — SIGNIFICANT CHANGE UP (ref 30–115)
ALT FLD-CCNC: 10 U/L — SIGNIFICANT CHANGE UP (ref 0–41)
ANION GAP SERPL CALC-SCNC: 11 MMOL/L — SIGNIFICANT CHANGE UP (ref 7–14)
APPEARANCE UR: (no result)
AST SERPL-CCNC: 13 U/L — SIGNIFICANT CHANGE UP (ref 0–41)
BACTERIA # UR AUTO: (no result)
BILIRUB DIRECT SERPL-MCNC: <0.2 MG/DL — SIGNIFICANT CHANGE UP (ref 0–0.2)
BILIRUB INDIRECT FLD-MCNC: >0.1 MG/DL — LOW (ref 0.2–1.2)
BILIRUB SERPL-MCNC: 0.3 MG/DL — SIGNIFICANT CHANGE UP (ref 0.2–1.2)
BILIRUB UR-MCNC: NEGATIVE — SIGNIFICANT CHANGE UP
BUN SERPL-MCNC: 6 MG/DL — LOW (ref 10–20)
CALCIUM SERPL-MCNC: 9.2 MG/DL — SIGNIFICANT CHANGE UP (ref 8.5–10.1)
CHLORIDE SERPL-SCNC: 103 MMOL/L — SIGNIFICANT CHANGE UP (ref 98–110)
CHOLEST SERPL-MCNC: 182 MG/DL — SIGNIFICANT CHANGE UP (ref 100–200)
CO2 SERPL-SCNC: 25 MMOL/L — SIGNIFICANT CHANGE UP (ref 17–32)
COLOR SPEC: YELLOW — SIGNIFICANT CHANGE UP
CREAT SERPL-MCNC: 0.6 MG/DL — LOW (ref 0.7–1.5)
DIFF PNL FLD: (no result)
EPI CELLS # UR: (no result) /HPF
GLUCOSE SERPL-MCNC: 101 MG/DL — HIGH (ref 70–99)
GLUCOSE UR QL: NEGATIVE MG/DL — SIGNIFICANT CHANGE UP
HCG UR QL: NEGATIVE — SIGNIFICANT CHANGE UP
HCT VFR BLD CALC: 42.2 % — SIGNIFICANT CHANGE UP (ref 37–47)
HDLC SERPL-MCNC: 46 MG/DL — SIGNIFICANT CHANGE UP (ref 40–125)
HGB BLD-MCNC: 14 G/DL — SIGNIFICANT CHANGE UP (ref 12–16)
KETONES UR-MCNC: NEGATIVE — SIGNIFICANT CHANGE UP
LEUKOCYTE ESTERASE UR-ACNC: SIGNIFICANT CHANGE UP
LIPID PNL WITH DIRECT LDL SERPL: 132 MG/DL — HIGH (ref 4–129)
MAGNESIUM SERPL-MCNC: 1.8 MG/DL — SIGNIFICANT CHANGE UP (ref 1.8–2.4)
MCHC RBC-ENTMCNC: 28.5 PG — SIGNIFICANT CHANGE UP (ref 27–31)
MCHC RBC-ENTMCNC: 33.2 G/DL — SIGNIFICANT CHANGE UP (ref 32–37)
MCV RBC AUTO: 85.9 FL — SIGNIFICANT CHANGE UP (ref 81–99)
NITRITE UR-MCNC: NEGATIVE — SIGNIFICANT CHANGE UP
NRBC # BLD: 0 /100 WBCS — SIGNIFICANT CHANGE UP (ref 0–0)
PH UR: 7 — SIGNIFICANT CHANGE UP (ref 5–8)
PLATELET # BLD AUTO: 269 K/UL — SIGNIFICANT CHANGE UP (ref 130–400)
POTASSIUM SERPL-MCNC: 4.2 MMOL/L — SIGNIFICANT CHANGE UP (ref 3.5–5)
POTASSIUM SERPL-SCNC: 4.2 MMOL/L — SIGNIFICANT CHANGE UP (ref 3.5–5)
PROT SERPL-MCNC: 6.9 G/DL — SIGNIFICANT CHANGE UP (ref 6–8)
PROT UR-MCNC: NEGATIVE MG/DL — SIGNIFICANT CHANGE UP
RBC # BLD: 4.91 M/UL — SIGNIFICANT CHANGE UP (ref 4.2–5.4)
RBC # FLD: 12.5 % — SIGNIFICANT CHANGE UP (ref 11.5–14.5)
RBC CASTS # UR COMP ASSIST: SIGNIFICANT CHANGE UP /HPF
SODIUM SERPL-SCNC: 139 MMOL/L — SIGNIFICANT CHANGE UP (ref 135–146)
SP GR SPEC: 1.02 — SIGNIFICANT CHANGE UP (ref 1.01–1.03)
TOTAL CHOLESTEROL/HDL RATIO MEASUREMENT: 4 RATIO — SIGNIFICANT CHANGE UP (ref 4–5.5)
TRIGL SERPL-MCNC: 98 MG/DL — SIGNIFICANT CHANGE UP (ref 10–149)
UROBILINOGEN FLD QL: 0.2 MG/DL — SIGNIFICANT CHANGE UP (ref 0.2–0.2)
WBC # BLD: 7.26 K/UL — SIGNIFICANT CHANGE UP (ref 4.8–10.8)
WBC # FLD AUTO: 7.26 K/UL — SIGNIFICANT CHANGE UP (ref 4.8–10.8)
WBC UR QL: >50 /HPF

## 2018-06-01 LAB
ESTIMATED AVERAGE GLUCOSE: 103 MG/DL — SIGNIFICANT CHANGE UP (ref 68–114)
HAV IGM SER-ACNC: SIGNIFICANT CHANGE UP
HBA1C BLD-MCNC: 5.2 % — SIGNIFICANT CHANGE UP (ref 4–5.6)
HBV CORE IGM SER-ACNC: SIGNIFICANT CHANGE UP
HBV SURFACE AG SER-ACNC: SIGNIFICANT CHANGE UP
HCV AB S/CO SERPL IA: 0.18 S/CO — SIGNIFICANT CHANGE UP
HCV AB SERPL-IMP: SIGNIFICANT CHANGE UP
T PALLIDUM AB TITR SER: NEGATIVE — SIGNIFICANT CHANGE UP

## 2018-06-14 ENCOUNTER — OUTPATIENT (OUTPATIENT)
Dept: OUTPATIENT SERVICES | Facility: HOSPITAL | Age: 23
LOS: 1 days | Discharge: HOME | End: 2018-06-14

## 2018-06-14 DIAGNOSIS — I49.9 CARDIAC ARRHYTHMIA, UNSPECIFIED: ICD-10-CM

## 2018-08-21 ENCOUNTER — INPATIENT (INPATIENT)
Facility: HOSPITAL | Age: 23
LOS: 1 days | Discharge: LEFT BEFORE DISCHARGE | End: 2018-08-23
Attending: INTERNAL MEDICINE | Admitting: INTERNAL MEDICINE

## 2018-08-21 VITALS
WEIGHT: 134.92 LBS | RESPIRATION RATE: 14 BRPM | HEIGHT: 62 IN | SYSTOLIC BLOOD PRESSURE: 122 MMHG | DIASTOLIC BLOOD PRESSURE: 71 MMHG | HEART RATE: 91 BPM | TEMPERATURE: 98 F

## 2018-08-21 DIAGNOSIS — F11.20 OPIOID DEPENDENCE, UNCOMPLICATED: ICD-10-CM

## 2018-08-21 DIAGNOSIS — F13.20 SEDATIVE, HYPNOTIC OR ANXIOLYTIC DEPENDENCE, UNCOMPLICATED: ICD-10-CM

## 2018-08-21 DIAGNOSIS — F17.200 NICOTINE DEPENDENCE, UNSPECIFIED, UNCOMPLICATED: ICD-10-CM

## 2018-08-21 DIAGNOSIS — F12.10 CANNABIS ABUSE, UNCOMPLICATED: ICD-10-CM

## 2018-08-21 DIAGNOSIS — F12.20 CANNABIS DEPENDENCE, UNCOMPLICATED: ICD-10-CM

## 2018-08-21 DIAGNOSIS — T14.91XA SUICIDE ATTEMPT, INITIAL ENCOUNTER: ICD-10-CM

## 2018-08-21 DIAGNOSIS — F31.9 BIPOLAR DISORDER, UNSPECIFIED: ICD-10-CM

## 2018-08-21 DIAGNOSIS — F13.10 SEDATIVE, HYPNOTIC OR ANXIOLYTIC ABUSE, UNCOMPLICATED: ICD-10-CM

## 2018-08-21 DIAGNOSIS — F11.10 OPIOID ABUSE, UNCOMPLICATED: ICD-10-CM

## 2018-08-21 LAB
ALBUMIN SERPL ELPH-MCNC: 4.5 G/DL — SIGNIFICANT CHANGE UP (ref 3.5–5.2)
ALP SERPL-CCNC: 92 U/L — SIGNIFICANT CHANGE UP (ref 30–115)
ALT FLD-CCNC: 19 U/L — SIGNIFICANT CHANGE UP (ref 0–41)
ANION GAP SERPL CALC-SCNC: 11 MMOL/L — SIGNIFICANT CHANGE UP (ref 7–14)
APPEARANCE UR: CLEAR — SIGNIFICANT CHANGE UP
AST SERPL-CCNC: 18 U/L — SIGNIFICANT CHANGE UP (ref 0–41)
BACTERIA # UR AUTO: ABNORMAL
BASOPHILS # BLD AUTO: 0.07 K/UL — SIGNIFICANT CHANGE UP (ref 0–0.2)
BASOPHILS NFR BLD AUTO: 1 % — SIGNIFICANT CHANGE UP (ref 0–1)
BILIRUB SERPL-MCNC: <0.2 MG/DL — SIGNIFICANT CHANGE UP (ref 0.2–1.2)
BILIRUB UR-MCNC: NEGATIVE — SIGNIFICANT CHANGE UP
BUN SERPL-MCNC: 8 MG/DL — LOW (ref 10–20)
CALCIUM SERPL-MCNC: 8.8 MG/DL — SIGNIFICANT CHANGE UP (ref 8.5–10.1)
CHLORIDE SERPL-SCNC: 101 MMOL/L — SIGNIFICANT CHANGE UP (ref 98–110)
CHOLEST SERPL-MCNC: 161 MG/DL — SIGNIFICANT CHANGE UP (ref 100–200)
CO2 SERPL-SCNC: 26 MMOL/L — SIGNIFICANT CHANGE UP (ref 17–32)
COLOR SPEC: YELLOW — SIGNIFICANT CHANGE UP
COMMENT - URINE: SIGNIFICANT CHANGE UP
CREAT SERPL-MCNC: 0.5 MG/DL — LOW (ref 0.7–1.5)
DIFF PNL FLD: NEGATIVE — SIGNIFICANT CHANGE UP
EOSINOPHIL # BLD AUTO: 0.21 K/UL — SIGNIFICANT CHANGE UP (ref 0–0.7)
EOSINOPHIL NFR BLD AUTO: 2.9 % — SIGNIFICANT CHANGE UP (ref 0–8)
EPI CELLS # UR: ABNORMAL /HPF
GGT SERPL-CCNC: 11 U/L — SIGNIFICANT CHANGE UP (ref 1–40)
GLUCOSE SERPL-MCNC: 112 MG/DL — HIGH (ref 70–99)
GLUCOSE UR QL: NEGATIVE MG/DL — SIGNIFICANT CHANGE UP
HCG UR QL: NEGATIVE — SIGNIFICANT CHANGE UP
HCT VFR BLD CALC: 39.4 % — SIGNIFICANT CHANGE UP (ref 37–47)
HDLC SERPL-MCNC: 36 MG/DL — LOW
HGB BLD-MCNC: 12.7 G/DL — SIGNIFICANT CHANGE UP (ref 12–16)
IMM GRANULOCYTES NFR BLD AUTO: 0.3 % — SIGNIFICANT CHANGE UP (ref 0.1–0.3)
KETONES UR-MCNC: NEGATIVE — SIGNIFICANT CHANGE UP
LEUKOCYTE ESTERASE UR-ACNC: NEGATIVE — SIGNIFICANT CHANGE UP
LIPID PNL WITH DIRECT LDL SERPL: 114 MG/DL — SIGNIFICANT CHANGE UP (ref 4–129)
LYMPHOCYTES # BLD AUTO: 1.83 K/UL — SIGNIFICANT CHANGE UP (ref 1.2–3.4)
LYMPHOCYTES # BLD AUTO: 25.2 % — SIGNIFICANT CHANGE UP (ref 20.5–51.1)
MAGNESIUM SERPL-MCNC: 1.8 MG/DL — SIGNIFICANT CHANGE UP (ref 1.8–2.4)
MCHC RBC-ENTMCNC: 28.3 PG — SIGNIFICANT CHANGE UP (ref 27–31)
MCHC RBC-ENTMCNC: 32.2 G/DL — SIGNIFICANT CHANGE UP (ref 32–37)
MCV RBC AUTO: 87.8 FL — SIGNIFICANT CHANGE UP (ref 81–99)
MONOCYTES # BLD AUTO: 0.55 K/UL — SIGNIFICANT CHANGE UP (ref 0.1–0.6)
MONOCYTES NFR BLD AUTO: 7.6 % — SIGNIFICANT CHANGE UP (ref 1.7–9.3)
NEUTROPHILS # BLD AUTO: 4.58 K/UL — SIGNIFICANT CHANGE UP (ref 1.4–6.5)
NEUTROPHILS NFR BLD AUTO: 63 % — SIGNIFICANT CHANGE UP (ref 42.2–75.2)
NITRITE UR-MCNC: NEGATIVE — SIGNIFICANT CHANGE UP
NRBC # BLD: 0 /100 WBCS — SIGNIFICANT CHANGE UP (ref 0–0)
PH UR: 6.5 — SIGNIFICANT CHANGE UP (ref 5–8)
PLATELET # BLD AUTO: 241 K/UL — SIGNIFICANT CHANGE UP (ref 130–400)
POTASSIUM SERPL-MCNC: 4 MMOL/L — SIGNIFICANT CHANGE UP (ref 3.5–5)
POTASSIUM SERPL-SCNC: 4 MMOL/L — SIGNIFICANT CHANGE UP (ref 3.5–5)
PROT SERPL-MCNC: 7.1 G/DL — SIGNIFICANT CHANGE UP (ref 6–8)
PROT UR-MCNC: ABNORMAL MG/DL
RBC # BLD: 4.49 M/UL — SIGNIFICANT CHANGE UP (ref 4.2–5.4)
RBC # FLD: 12.5 % — SIGNIFICANT CHANGE UP (ref 11.5–14.5)
SODIUM SERPL-SCNC: 138 MMOL/L — SIGNIFICANT CHANGE UP (ref 135–146)
SP GR SPEC: 1.02 — SIGNIFICANT CHANGE UP (ref 1.01–1.03)
TOTAL CHOLESTEROL/HDL RATIO MEASUREMENT: 4.5 RATIO — SIGNIFICANT CHANGE UP (ref 4–5.5)
TRIGL SERPL-MCNC: 105 MG/DL — SIGNIFICANT CHANGE UP (ref 10–149)
UROBILINOGEN FLD QL: 0.2 MG/DL — SIGNIFICANT CHANGE UP (ref 0.2–0.2)
WBC # BLD: 7.26 K/UL — SIGNIFICANT CHANGE UP (ref 4.8–10.8)
WBC # FLD AUTO: 7.26 K/UL — SIGNIFICANT CHANGE UP (ref 4.8–10.8)
WBC UR QL: SIGNIFICANT CHANGE UP /HPF

## 2018-08-21 RX ORDER — PHENOBARBITAL 60 MG
32.4 TABLET ORAL EVERY 4 HOURS
Qty: 0 | Refills: 0 | Status: DISCONTINUED | OUTPATIENT
Start: 2018-08-21 | End: 2018-08-23

## 2018-08-21 RX ORDER — IBUPROFEN 200 MG
0 TABLET ORAL
Qty: 0 | Refills: 0 | COMMUNITY

## 2018-08-21 RX ORDER — PHENOBARBITAL 60 MG
32.4 TABLET ORAL EVERY 12 HOURS
Qty: 0 | Refills: 0 | Status: DISCONTINUED | OUTPATIENT
Start: 2018-08-24 | End: 2018-08-23

## 2018-08-21 RX ORDER — ACETAMINOPHEN 500 MG
0 TABLET ORAL
Qty: 0 | Refills: 0 | COMMUNITY

## 2018-08-21 RX ORDER — IBUPROFEN 200 MG
600 TABLET ORAL EVERY 6 HOURS
Qty: 0 | Refills: 0 | Status: DISCONTINUED | OUTPATIENT
Start: 2018-08-21 | End: 2018-08-23

## 2018-08-21 RX ORDER — METHADONE HYDROCHLORIDE 40 MG/1
50 TABLET ORAL
Qty: 0 | Refills: 0 | Status: DISCONTINUED | OUTPATIENT
Start: 2018-08-21 | End: 2018-08-23

## 2018-08-21 RX ORDER — PSEUDOEPHEDRINE HCL 30 MG
60 TABLET ORAL EVERY 6 HOURS
Qty: 0 | Refills: 0 | Status: DISCONTINUED | OUTPATIENT
Start: 2018-08-21 | End: 2018-08-23

## 2018-08-21 RX ORDER — PHENOBARBITAL 60 MG
32.4 TABLET ORAL EVERY 6 HOURS
Qty: 0 | Refills: 0 | Status: DISCONTINUED | OUTPATIENT
Start: 2018-08-21 | End: 2018-08-23

## 2018-08-21 RX ORDER — PHENOBARBITAL 60 MG
32.4 TABLET ORAL EVERY 6 HOURS
Qty: 0 | Refills: 0 | Status: COMPLETED | OUTPATIENT
Start: 2018-08-23 | End: 2018-08-23

## 2018-08-21 RX ORDER — METHADONE HYDROCHLORIDE 40 MG/1
70 TABLET ORAL
Qty: 0 | Refills: 0 | COMMUNITY

## 2018-08-21 RX ORDER — NICOTINE POLACRILEX 2 MG
1 GUM BUCCAL DAILY
Qty: 0 | Refills: 0 | Status: DISCONTINUED | OUTPATIENT
Start: 2018-08-21 | End: 2018-08-23

## 2018-08-21 RX ORDER — MAGNESIUM HYDROXIDE 400 MG/1
30 TABLET, CHEWABLE ORAL ONCE
Qty: 0 | Refills: 0 | Status: DISCONTINUED | OUTPATIENT
Start: 2018-08-21 | End: 2018-08-23

## 2018-08-21 RX ORDER — PHENOBARBITAL 60 MG
TABLET ORAL
Qty: 0 | Refills: 0 | Status: DISCONTINUED | OUTPATIENT
Start: 2018-08-21 | End: 2018-08-23

## 2018-08-21 RX ORDER — ACETAMINOPHEN 500 MG
650 TABLET ORAL EVERY 4 HOURS
Qty: 0 | Refills: 0 | Status: DISCONTINUED | OUTPATIENT
Start: 2018-08-21 | End: 2018-08-23

## 2018-08-21 RX ORDER — HYDROXYZINE HCL 10 MG
100 TABLET ORAL AT BEDTIME
Qty: 0 | Refills: 0 | Status: DISCONTINUED | OUTPATIENT
Start: 2018-08-21 | End: 2018-08-23

## 2018-08-21 RX ORDER — HYDROXYZINE HCL 10 MG
50 TABLET ORAL EVERY 6 HOURS
Qty: 0 | Refills: 0 | Status: DISCONTINUED | OUTPATIENT
Start: 2018-08-21 | End: 2018-08-23

## 2018-08-21 RX ORDER — METHOCARBAMOL 500 MG/1
500 TABLET, FILM COATED ORAL EVERY 6 HOURS
Qty: 0 | Refills: 0 | Status: DISCONTINUED | OUTPATIENT
Start: 2018-08-21 | End: 2018-08-23

## 2018-08-21 RX ADMIN — Medication 1 PATCH: at 14:32

## 2018-08-21 RX ADMIN — Medication 100 MILLIGRAM(S): at 23:38

## 2018-08-21 RX ADMIN — METHOCARBAMOL 500 MILLIGRAM(S): 500 TABLET, FILM COATED ORAL at 23:38

## 2018-08-21 RX ADMIN — METHADONE HYDROCHLORIDE 50 MILLIGRAM(S): 40 TABLET ORAL at 13:34

## 2018-08-21 RX ADMIN — Medication 32.4 MILLIGRAM(S): at 23:38

## 2018-08-21 RX ADMIN — Medication 32.4 MILLIGRAM(S): at 18:07

## 2018-08-21 NOTE — H&P ADULT - ASSESSMENT
This is 22 Y/O Female with Hx of Continous Benzo,Opiate,and Cannabis Dependency,S/P Detox at [facility] 2/09/2018-2/14/2018,relapsed 3 Months PTA

## 2018-08-21 NOTE — H&P ADULT - ATTENDING COMMENTS
Patient interviewed and examined.    Chart reviewed.    PA's H&P noted and modified, as appropriate.    Case discussed on team rounds    Following is my summary of the case.    Admitted for detox: from ____ED, __x_Intake, ____Med/Surg Floor    Alcohol____   Opioid__x___  Benzo_x__ Other____x_    Substance amount, duration of use, last usage, and prior attempts at detox or rehabs, are outlined above in the H&P and discussed with patient.    Associated withdrawal symptoms presents.  Comorbid conditions noted. Chronic and Stable.    Past Medical Hx, Psych Hx, family Hx, Social Hx from H&P reviewed and NO changes.    Old medical record and medication Hx. Reviewed    Following items reviewed and addressed:  1. labs  2. EKG  3. Imaging from PACs module    Examination: no change from PA's exam.    Place on following protocol  _____Medically Managed  __X__Medically Supervised    Ciwa_____Librium taper____Ativan taper___Methadone taper___ Phenobarb taper__x__ Suboxone Induction____MMTP____  Methadone Maintenance  Narcan Kit Offered    Psych Consult __X__N/A  ___Ordered    Physical Therapy  ___X    ___  Ordered    Aftercare disposition to be addressed by counselors.    Estimated length of stay 3-5 days.

## 2018-08-21 NOTE — CHART NOTE - NSCHARTNOTEFT_GEN_A_CORE
22 yo female with hx of heroine and benzo use d/o, 3 prior SA via cutting wrists (last at age 19), no known prior IPP admissions, lost to OP f/u, presenting to CDU for benzo withdrawal and taper. Pt denies suicidal/homicidal ideation, intent or plan at this time. She reports feeling safe on unit. She notes her son and boyfriend to be factors that make her hopeful for future. Pt says she is considering inpatient CDRU as next phase of treatment. She appears calm, is well mannered and appropriate, has a euthymic, full affect, and is AAOx4. Exam is limited 2/2 to pt's discomfort from benzo withdrawal. She does not appear to be acute danger to self or others at this time, and does not qualify for emergency IPP admission. She is free to leave AMA should she so choose.

## 2018-08-21 NOTE — H&P ADULT - HISTORY OF PRESENT ILLNESS
This is 24 Y/O Female with Hx of Continous Benzo,Opiate,and Cannabis Dependency,S/P Detox at [facility] 2018-2018,relapsed 3 Months PTA    DRUG	AGE OF ONSET	ROUTE	FREQ	AMOUNT	LAST USE	LENGTH OF CURRENT USE	  XANAX	19 Y/O	PO	Daily	3 Sticks	2018 3 sticks	2 Weeks	  HEROIN	19 Y/O	IV	Daily	3 Bags	2018 3 bags	3 months	  CANNABIS	16 Y/O	Smoking	Daily	$10	2018 $10	3 Months	  							  							  Hx of Accidental OD on Heroin once in   MMTP: Yes     [facility] MMTP  Pt on Methadone 40 mg po daily,LDM: 2018 with 40 mg,dose confirmed with RN: Olivia Kemp [phone]  Pt should be getting Methadone 50 mg today as per Pa: Min [phone]  Hx x of Withdrawal Seizures: No   Psyhx: Bipolar D/O    Hx of Suicidal attempts in the past,last attempt in  Via OD             Denies any S/H Ideation or A/V Hallucination   no Medication  Screening history	Last tested	Result	History of treatment	  HIV	2016	NEG	N/A	  Hepatitis C	2016	NEG	N/A	  Quantiferon GOLD TB test	2016	NEG	N/A	    Immunization	Not Received	Unknown	Received	Date Received 	  Influenza			2017		  Pneumococcus		v			  Tetanus			2017	<10 years	  Others					  					  I-Stop: Negative  GYN Hx:  LMP: 2018     last Pap Smear : 2018 (WNL)    P: 1              Last Mammography: never had one  Patient A&Ox3, denies CP, sob,Abdominal pain,blurry Vision, headache, dizziness, bleeding or  dysuria.

## 2018-08-21 NOTE — H&P ADULT - PROBLEM SELECTOR PLAN 2
pt on MMTP  check Urine toxicology  Rehab advised  increase Coping Skills & Counslling  Methadone 50 mg po starts today

## 2018-08-21 NOTE — H&P ADULT - NSHPPHYSICALEXAM_GEN_ALL_CORE
-  Vital Signs:      Temp:  98.2    Pulse:  86     RR:  14     BP: 92/52                       Constitutional: anxious A&Ox3, WD/WN  Eyes: PERRLA  Respiratory: +air entry, no rales, no rhonchi, no wheezes  Cardiovascular: +S1 and S2,RRR  Gastrointestinal: +BS, soft, non-tender, not distended, No CVAT  Extremities: no cyanosis, no edema, no calf tenderness,   Vascular: +dorsal pedis and radial pulses, no extremity cyanosis  Neurological: sensation intact, ROM equal B/L, CN II-XII intact, Gait: steady  Skin: no rashes, normal turgor, No track marks   No Decubiti present  No IV lines present  Rectal/Breasts Exam: Deferred

## 2018-08-22 DIAGNOSIS — F33.2 MAJOR DEPRESSIVE DISORDER, RECURRENT SEVERE WITHOUT PSYCHOTIC FEATURES: ICD-10-CM

## 2018-08-22 DIAGNOSIS — F11.20 OPIOID DEPENDENCE, UNCOMPLICATED: ICD-10-CM

## 2018-08-22 LAB
AMPHET UR-MCNC: NEGATIVE — SIGNIFICANT CHANGE UP
BARBITURATES UR SCN-MCNC: NEGATIVE — SIGNIFICANT CHANGE UP
BENZODIAZ UR-MCNC: POSITIVE
COCAINE METAB.OTHER UR-MCNC: NEGATIVE — SIGNIFICANT CHANGE UP
DRUG SCREEN 1, URINE RESULT: SIGNIFICANT CHANGE UP
ESTIMATED AVERAGE GLUCOSE: 97 MG/DL — SIGNIFICANT CHANGE UP (ref 68–114)
HAV IGM SER-ACNC: SIGNIFICANT CHANGE UP
HBA1C BLD-MCNC: 5 % — SIGNIFICANT CHANGE UP (ref 4–5.6)
HBV CORE IGM SER-ACNC: SIGNIFICANT CHANGE UP
HBV SURFACE AG SER-ACNC: SIGNIFICANT CHANGE UP
HCV AB S/CO SERPL IA: 0.32 S/CO — SIGNIFICANT CHANGE UP
HCV AB SERPL-IMP: SIGNIFICANT CHANGE UP
HIV 1+2 AB+HIV1 P24 AG SERPL QL IA: SIGNIFICANT CHANGE UP
METHADONE UR-MCNC: POSITIVE
OPIATES UR-MCNC: POSITIVE
PCP UR-MCNC: NEGATIVE — SIGNIFICANT CHANGE UP
PROPOXYPHENE QUALITATIVE URINE RESULT: NEGATIVE — SIGNIFICANT CHANGE UP
T PALLIDUM AB TITR SER: NEGATIVE — SIGNIFICANT CHANGE UP
THC UR QL: NEGATIVE — SIGNIFICANT CHANGE UP

## 2018-08-22 RX ADMIN — METHADONE HYDROCHLORIDE 50 MILLIGRAM(S): 40 TABLET ORAL at 06:12

## 2018-08-22 RX ADMIN — Medication 600 MILLIGRAM(S): at 14:50

## 2018-08-22 RX ADMIN — METHOCARBAMOL 500 MILLIGRAM(S): 500 TABLET, FILM COATED ORAL at 22:29

## 2018-08-22 RX ADMIN — Medication 32.4 MILLIGRAM(S): at 12:45

## 2018-08-22 RX ADMIN — Medication 32.4 MILLIGRAM(S): at 06:11

## 2018-08-22 RX ADMIN — Medication 32.4 MILLIGRAM(S): at 18:00

## 2018-08-22 RX ADMIN — Medication 1 PATCH: at 10:03

## 2018-08-22 RX ADMIN — Medication 600 MILLIGRAM(S): at 13:31

## 2018-08-22 RX ADMIN — Medication 1 PATCH: at 10:00

## 2018-08-22 RX ADMIN — METHOCARBAMOL 500 MILLIGRAM(S): 500 TABLET, FILM COATED ORAL at 13:31

## 2018-08-22 RX ADMIN — Medication 1 TABLET(S): at 10:00

## 2018-08-22 NOTE — BEHAVIORAL HEALTH ASSESSMENT NOTE - DETAILS
attempted to slit wrist at age 16, d/c'd from ER, no sutures mother w bpad both parents michaela olvera

## 2018-08-22 NOTE — BEHAVIORAL HEALTH ASSESSMENT NOTE - SUMMARY
23 SWF w no past formal dx, opiate/benzo use presents to rehab reporting motivation. Pt domiciled, unemployed, has custody of 3 yo son Theo. Recovery complicated by fact that mother is active user and uses with patient. Pt reports 6 months of sobriety during which she was vegetative and feeling lively and energetic only when she is on substances indicative of an underlying mood disorder. Pt agrees to SSRI, risks and benefits of tx plan reviewed.

## 2018-08-22 NOTE — BEHAVIORAL HEALTH ASSESSMENT NOTE - HPI (INCLUDE ILLNESS QUALITY, SEVERITY, DURATION, TIMING, CONTEXT, MODIFYING FACTORS, ASSOCIATED SIGNS AND SYMPTOMS)
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 Pt reports heroin use started approx 2 years ago "I couldn't get blues and my friend had it so I tried it to not get sick". Pt has ho 6 mos of sobriety after detoxing at [facility] and doing rehab. Pt attributes her relapse to the fact that her mother is an active substance user "she is my best friend and she says she'd rather me do it in the house while she's watching instead of me doing it in the street. They worry that something bad will happen and someone will leave me in a ditch somewhere". Mother is supportive of recovery at this time. Pt reports motivation for recovery because "I want to be a better mom than I had. My mom was a good mom but she always had drugs in her life. Like when I was 14, I had no curfew, they would give me money and let me go to the movies all night. But I always had everything I needed".   Re mental health, pt reports "my fiance thinks I'm bipolar" because I "spaz out all the time" but has never been officially diagnosed with anything. Pt has had outpt therapy connected to a treatment program. Pt is interested in medication for depression and anxiety. Pt has 1 suicide attempt, detailed below, no current SI. Recently, pt reports being "ok" except that she is away from son and parents and fiance, sleeping well with medication given on unit. Pt reports "normal" appetite. Pt reports during her 6 months of sobriety pt reports "staying in bed a lot, ordering food, binge watching food". Pt states this is her norm when she is sober. "When I'm high, I'll go do my hair and nails and do more stuff". Pt reports feeling motivated but regarding her ruth in her ability to stay sober, pt states "I'm 50/50. I'm not sure but I'm gonna try".

## 2018-08-22 NOTE — BEHAVIORAL HEALTH ASSESSMENT NOTE - NSBHSOCIALHXDETAILSFT_PSY_A_CORE
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

## 2018-08-23 VITALS
SYSTOLIC BLOOD PRESSURE: 98 MMHG | HEART RATE: 76 BPM | DIASTOLIC BLOOD PRESSURE: 57 MMHG | TEMPERATURE: 98 F | RESPIRATION RATE: 15 BRPM

## 2018-08-23 LAB
GAMMA INTERFERON BACKGROUND BLD IA-ACNC: 0.05 IU/ML — SIGNIFICANT CHANGE UP
M TB IFN-G BLD-IMP: NEGATIVE — SIGNIFICANT CHANGE UP
M TB IFN-G CD4+ BCKGRND COR BLD-ACNC: 0.01 IU/ML — SIGNIFICANT CHANGE UP
M TB IFN-G CD4+CD8+ BCKGRND COR BLD-ACNC: 0 IU/ML — SIGNIFICANT CHANGE UP
QUANT TB PLUS MITOGEN MINUS NIL: >10 IU/ML — SIGNIFICANT CHANGE UP

## 2018-08-23 RX ORDER — METHADONE HYDROCHLORIDE 40 MG/1
40 TABLET ORAL
Qty: 0 | Refills: 0 | COMMUNITY

## 2018-08-23 RX ADMIN — METHADONE HYDROCHLORIDE 50 MILLIGRAM(S): 40 TABLET ORAL at 05:49

## 2018-08-23 RX ADMIN — METHOCARBAMOL 500 MILLIGRAM(S): 500 TABLET, FILM COATED ORAL at 09:32

## 2018-08-23 RX ADMIN — Medication 32.4 MILLIGRAM(S): at 05:49

## 2018-08-23 RX ADMIN — Medication 600 MILLIGRAM(S): at 09:32

## 2018-08-23 RX ADMIN — Medication 32.4 MILLIGRAM(S): at 00:20

## 2018-08-23 RX ADMIN — Medication 100 MILLIGRAM(S): at 00:21

## 2018-08-23 RX ADMIN — Medication 1 PATCH: at 09:34

## 2018-08-23 RX ADMIN — Medication 1 TABLET(S): at 09:33

## 2018-08-23 NOTE — CHART NOTE - NSCHARTNOTEFT_GEN_A_CORE
Subsequent Inpatient Encounter                                       Detox Unit    JOSÉ MANUEL JI   23y   Female      Chief Complaint:    Follow up for Benzodiazipine  Dependency    HPI:     I reviewed previous notes.No Change, except if noted below.             Detail:_    ROS:   I reviewed with patient.  No changes from previous notes except if noted below.             Detail: _    PFSH I reviewed with patient. No changes from previous notes except if noted below.             Detail_    Medication reconciliation performed.    MEDICATIONS  (STANDING):  methadone   Dispersible Tablet 50 milliGRAM(s) Oral <User Schedule>  multivitamin 1 Tablet(s) Oral daily  nicotine - 21 mG/24Hr(s) Patch 1 Patch Transdermal daily  PHENobarbital   Oral   PHENobarbital 32.4 milliGRAM(s) Oral every 6 hours      MEDICATIONS  (PRN):  acetaminophen   Tablet 650 milliGRAM(s) Oral every 4 hours PRN For Temp greater than 38.5 C (101.3 F)  aluminum hydroxide/magnesium hydroxide/simethicone Suspension 30 milliLiter(s) Oral every 6 hours PRN Heartburn  bismuth subsalicylate Liquid 30 milliLiter(s) Oral every 6 hours PRN Diarrhea  cloNIDine 0.1 milliGRAM(s) Oral every 8 hours PRN Blood Pressure GREATER THAN 140/90 mmHG  cloNIDine 0.1 milliGRAM(s) Oral every 8 hours PRN opiate withdrawal  guaiFENesin/dextromethorphan  Syrup 5 milliLiter(s) Oral every 4 hours PRN Cough  hydrOXYzine hydrochloride 50 milliGRAM(s) Oral every 6 hours PRN Anxiety  hydrOXYzine hydrochloride 100 milliGRAM(s) Oral at bedtime PRN insomnia  ibuprofen  Tablet 600 milliGRAM(s) Oral every 6 hours PRN Mild Pain (1-3)  magnesium hydroxide Suspension 30 milliLiter(s) Oral once PRN Constipation  methocarbamol 500 milliGRAM(s) Oral every 6 hours PRN muscle pain  PHENobarbital 32.4 milliGRAM(s) Oral every 4 hours PRN Withdrawal  pseudoephedrine 60 milliGRAM(s) Oral every 6 hours PRN Rhinitis  trimethobenzamide 300 milliGRAM(s) Oral every 6 hours PRN Nausea and/or Vomiting  trimethobenzamide Injectable 200 milliGRAM(s) IntraMuscular every 6 hours PRN Nausea and/or Vomiting      T(C): 36 (18 @ 06:07), Max: 36.8 (18 @ 18:00)  HR: 66 (18 @ 06:07) (66 - 84)  BP: 111/62 (18 @ 06:07) (90/54 - 111/67)  RR: 16 (18 @ 06:07) (16 - 18)  SpO2: --    PHYSICAL EXAM:      Constitutional: NAD, A&O x3    Eyes: PERRLA, no conjuctivitis    Neck: no lymphadenopathy    Respiratory: +air entry, no rales, no rhonchi, no wheezes    Cardiovascular: +S1 and S2, regular rate and rhythm    Gastrointestinal: +BS, soft, non-tender, not distended    Extremities:  no edema, no calf tenderness    Skin: no rashes, normal turgor                            12.7   7.26  )-----------( 241      ( 21 Aug 2018 20:22 )             39.4       138  |  101  |  8<L>  ----------------------------<  112<H>  4.0   |  26  |  0.5<L>    Ca    8.8      21 Aug 2018 20:22  Mg     1.8         TPro  7.1  /  Alb  4.5  /  TBili  <0.2  /  DBili  x   /  AST  18  /  ALT  19  /  AlkPhos  92      Magnesium, Serum: 1.8 mg/dL (18 @ 20:22)  Hemoglobin A1C, Whole Blood: 5.0 % (18 @ 20:22)  Treponema Pallidum Antibody Interpretation: Negative (18 @ 20:22)  Hepatitis B Surface Antigen: Nonreact (18 @ 20:22)  Hepatitis C Virus S/CO Ratio: 0.32 S/CO (18 @ 20:22)    Hepatitis C Virus Interpretation: Nonreact (18 @ 20:22)      Urinalysis Basic - ( 21 Aug 2018 13:38 )    Color: Yellow / Appearance: Clear / S.025 / pH: x  Gluc: x / Ketone: Negative  / Bili: Negative / Urobili: 0.2 mg/dL   Blood: x / Protein: Trace mg/dL / Nitrite: Negative   Leuk Esterase: Negative / RBC: x / WBC 1-2 /HPF   Sq Epi: x / Non Sq Epi: Few /HPF / Bacteria: Few    Drug Screen 1, Urine Result: Done (18 @ 20:22)        Impression and Plan:    Primary Diagnosis:  Benzodiazipine Dependency                                Medication: Phenobarbitol Protocol    Secondary Diagnosis:                                                                                Medication:    Tertiary Diagnosis:                                                                                     Medication:      Continue Detox Protocols. Use of PRNS as needed for withdrawal and comfort.    Adjustments to protocols:    Labs/ Tests reviewed.    Tests ordered:     Likely Disposition: _x__Home       ___Rehab       ___Outpatient Program    ___Self Help     _____Other    Estimated Length of stay:_4___

## 2018-08-23 NOTE — CHART NOTE - NSCHARTNOTEFT_GEN_A_CORE
Allergies:  fish (Hives)  pentazocine (Other)      Diet: Regular    Activity: as tolerated    Follow up with    1. PMD in 2 weeks    2. Psych in 2 weeks    Extra Instructions: Go to aftercare      Flu Vaccine given  Yes_____         No______      Diagnosis:  Chemical Dependency   Maintain sobriety  refrain from all use      Patient Signature___________________________________________  Date_________________      Nurse Signature_____________________________________________Date_________________

## 2018-08-30 DIAGNOSIS — F12.20 CANNABIS DEPENDENCE, UNCOMPLICATED: ICD-10-CM

## 2018-08-30 DIAGNOSIS — Z88.8 ALLERGY STATUS TO OTHER DRUGS, MEDICAMENTS AND BIOLOGICAL SUBSTANCES STATUS: ICD-10-CM

## 2018-08-30 DIAGNOSIS — Z91.5 PERSONAL HISTORY OF SELF-HARM: ICD-10-CM

## 2018-08-30 DIAGNOSIS — F33.2 MAJOR DEPRESSIVE DISORDER, RECURRENT SEVERE WITHOUT PSYCHOTIC FEATURES: ICD-10-CM

## 2018-08-30 DIAGNOSIS — F17.210 NICOTINE DEPENDENCE, CIGARETTES, UNCOMPLICATED: ICD-10-CM

## 2018-08-30 DIAGNOSIS — F13.239 SEDATIVE, HYPNOTIC OR ANXIOLYTIC DEPENDENCE WITH WITHDRAWAL, UNSPECIFIED: ICD-10-CM

## 2018-08-30 DIAGNOSIS — F11.20 OPIOID DEPENDENCE, UNCOMPLICATED: ICD-10-CM

## 2018-08-30 DIAGNOSIS — Z91.013 ALLERGY TO SEAFOOD: ICD-10-CM

## 2020-08-04 ENCOUNTER — INPATIENT (INPATIENT)
Facility: HOSPITAL | Age: 25
LOS: 2 days | Discharge: HOME | End: 2020-08-07
Attending: HOSPITALIST | Admitting: HOSPITALIST
Payer: COMMERCIAL

## 2020-08-04 VITALS
OXYGEN SATURATION: 96 % | WEIGHT: 130.07 LBS | SYSTOLIC BLOOD PRESSURE: 104 MMHG | TEMPERATURE: 100 F | DIASTOLIC BLOOD PRESSURE: 60 MMHG | RESPIRATION RATE: 18 BRPM | HEART RATE: 115 BPM

## 2020-08-04 PROBLEM — F11.20 OPIOID DEPENDENCE, UNCOMPLICATED: Chronic | Status: ACTIVE | Noted: 2018-08-21

## 2020-08-04 PROBLEM — T40.1X1A POISONING BY HEROIN, ACCIDENTAL (UNINTENTIONAL), INITIAL ENCOUNTER: Chronic | Status: ACTIVE | Noted: 2018-08-21

## 2020-08-04 PROBLEM — F17.200 NICOTINE DEPENDENCE, UNSPECIFIED, UNCOMPLICATED: Chronic | Status: ACTIVE | Noted: 2018-08-21

## 2020-08-04 PROBLEM — F31.9 BIPOLAR DISORDER, UNSPECIFIED: Chronic | Status: ACTIVE | Noted: 2018-08-21

## 2020-08-04 PROBLEM — T14.91XA SUICIDE ATTEMPT, INITIAL ENCOUNTER: Chronic | Status: ACTIVE | Noted: 2018-08-21

## 2020-08-04 PROBLEM — F12.20 CANNABIS DEPENDENCE, UNCOMPLICATED: Chronic | Status: ACTIVE | Noted: 2018-08-21

## 2020-08-04 PROBLEM — F11.10 OPIOID ABUSE, UNCOMPLICATED: Chronic | Status: ACTIVE | Noted: 2018-08-21

## 2020-08-04 LAB
ALBUMIN SERPL ELPH-MCNC: 4.2 G/DL — SIGNIFICANT CHANGE UP (ref 3.5–5.2)
ALP SERPL-CCNC: 115 U/L — SIGNIFICANT CHANGE UP (ref 30–115)
ALT FLD-CCNC: 41 U/L — SIGNIFICANT CHANGE UP (ref 0–41)
AMPHET UR-MCNC: NEGATIVE — SIGNIFICANT CHANGE UP
ANION GAP SERPL CALC-SCNC: 15 MMOL/L — HIGH (ref 7–14)
ANISOCYTOSIS BLD QL: SLIGHT — SIGNIFICANT CHANGE UP
APPEARANCE UR: CLEAR — SIGNIFICANT CHANGE UP
AST SERPL-CCNC: 35 U/L — SIGNIFICANT CHANGE UP (ref 0–41)
BARBITURATES UR SCN-MCNC: NEGATIVE — SIGNIFICANT CHANGE UP
BASE EXCESS BLDV CALC-SCNC: 4.5 MMOL/L — HIGH (ref -2–2)
BASOPHILS # BLD AUTO: 0 K/UL — SIGNIFICANT CHANGE UP (ref 0–0.2)
BASOPHILS NFR BLD AUTO: 0 % — SIGNIFICANT CHANGE UP (ref 0–1)
BENZODIAZ UR-MCNC: NEGATIVE — SIGNIFICANT CHANGE UP
BILIRUB SERPL-MCNC: 0.8 MG/DL — SIGNIFICANT CHANGE UP (ref 0.2–1.2)
BILIRUB UR-MCNC: NEGATIVE — SIGNIFICANT CHANGE UP
BUN SERPL-MCNC: 13 MG/DL — SIGNIFICANT CHANGE UP (ref 10–20)
CA-I SERPL-SCNC: 1.15 MMOL/L — SIGNIFICANT CHANGE UP (ref 1.12–1.3)
CALCIUM SERPL-MCNC: 9.6 MG/DL — SIGNIFICANT CHANGE UP (ref 8.5–10.1)
CHLORIDE SERPL-SCNC: 96 MMOL/L — LOW (ref 98–110)
CO2 SERPL-SCNC: 26 MMOL/L — SIGNIFICANT CHANGE UP (ref 17–32)
COCAINE METAB.OTHER UR-MCNC: NEGATIVE — SIGNIFICANT CHANGE UP
COLOR SPEC: COLORLESS — SIGNIFICANT CHANGE UP
CREAT SERPL-MCNC: 0.8 MG/DL — SIGNIFICANT CHANGE UP (ref 0.7–1.5)
DIFF PNL FLD: NEGATIVE — SIGNIFICANT CHANGE UP
DRUG SCREEN 1, URINE RESULT: SIGNIFICANT CHANGE UP
EOSINOPHIL # BLD AUTO: 0 K/UL — SIGNIFICANT CHANGE UP (ref 0–0.7)
EOSINOPHIL NFR BLD AUTO: 0 % — SIGNIFICANT CHANGE UP (ref 0–8)
GAS PNL BLDV: 136 MMOL/L — SIGNIFICANT CHANGE UP (ref 136–145)
GAS PNL BLDV: SIGNIFICANT CHANGE UP
GIANT PLATELETS BLD QL SMEAR: PRESENT — SIGNIFICANT CHANGE UP
GLUCOSE SERPL-MCNC: 114 MG/DL — HIGH (ref 70–99)
GLUCOSE UR QL: NEGATIVE — SIGNIFICANT CHANGE UP
HCG SERPL QL: NEGATIVE — SIGNIFICANT CHANGE UP
HCO3 BLDV-SCNC: 29 MMOL/L — SIGNIFICANT CHANGE UP (ref 22–29)
HCT VFR BLD CALC: 40.2 % — SIGNIFICANT CHANGE UP (ref 37–47)
HCT VFR BLDA CALC: 41.6 % — SIGNIFICANT CHANGE UP (ref 34–44)
HGB BLD CALC-MCNC: 13.6 G/DL — LOW (ref 14–18)
HGB BLD-MCNC: 13.2 G/DL — SIGNIFICANT CHANGE UP (ref 12–16)
KETONES UR-MCNC: NEGATIVE — SIGNIFICANT CHANGE UP
LACTATE BLDV-MCNC: 1.3 MMOL/L — SIGNIFICANT CHANGE UP (ref 0.5–1.6)
LEUKOCYTE ESTERASE UR-ACNC: NEGATIVE — SIGNIFICANT CHANGE UP
LYMPHOCYTES # BLD AUTO: 0 % — LOW (ref 20.5–51.1)
LYMPHOCYTES # BLD AUTO: 0 K/UL — LOW (ref 1.2–3.4)
MACROCYTES BLD QL: SLIGHT — SIGNIFICANT CHANGE UP
MANUAL SMEAR VERIFICATION: SIGNIFICANT CHANGE UP
MCHC RBC-ENTMCNC: 28.2 PG — SIGNIFICANT CHANGE UP (ref 27–31)
MCHC RBC-ENTMCNC: 32.8 G/DL — SIGNIFICANT CHANGE UP (ref 32–37)
MCV RBC AUTO: 85.9 FL — SIGNIFICANT CHANGE UP (ref 81–99)
METHADONE UR-MCNC: NEGATIVE — SIGNIFICANT CHANGE UP
MONOCYTES # BLD AUTO: 0 K/UL — LOW (ref 0.1–0.6)
MONOCYTES NFR BLD AUTO: 0 % — LOW (ref 1.7–9.3)
NEUTROPHILS # BLD AUTO: 20.55 K/UL — HIGH (ref 1.4–6.5)
NEUTROPHILS NFR BLD AUTO: 84.3 % — HIGH (ref 42.2–75.2)
NEUTS BAND # BLD: 15.7 % — HIGH (ref 0–6)
NITRITE UR-MCNC: NEGATIVE — SIGNIFICANT CHANGE UP
NT-PROBNP SERPL-SCNC: 144 PG/ML — SIGNIFICANT CHANGE UP (ref 0–300)
OPIATES UR-MCNC: NEGATIVE — SIGNIFICANT CHANGE UP
PCO2 BLDV: 40 MMHG — LOW (ref 41–51)
PCP UR-MCNC: NEGATIVE — SIGNIFICANT CHANGE UP
PH BLDV: 7.47 — HIGH (ref 7.26–7.43)
PH UR: 8 — SIGNIFICANT CHANGE UP (ref 5–8)
PLAT MORPH BLD: NORMAL — SIGNIFICANT CHANGE UP
PLATELET # BLD AUTO: 265 K/UL — SIGNIFICANT CHANGE UP (ref 130–400)
PO2 BLDV: 47 MMHG — HIGH (ref 20–40)
POLYCHROMASIA BLD QL SMEAR: SIGNIFICANT CHANGE UP
POTASSIUM BLDV-SCNC: 3.4 MMOL/L — SIGNIFICANT CHANGE UP (ref 3.3–5.6)
POTASSIUM SERPL-MCNC: 3.8 MMOL/L — SIGNIFICANT CHANGE UP (ref 3.5–5)
POTASSIUM SERPL-SCNC: 3.8 MMOL/L — SIGNIFICANT CHANGE UP (ref 3.5–5)
PROPOXYPHENE QUALITATIVE URINE RESULT: NEGATIVE — SIGNIFICANT CHANGE UP
PROT SERPL-MCNC: 7.2 G/DL — SIGNIFICANT CHANGE UP (ref 6–8)
PROT UR-MCNC: NEGATIVE — SIGNIFICANT CHANGE UP
RBC # BLD: 4.68 M/UL — SIGNIFICANT CHANGE UP (ref 4.2–5.4)
RBC # FLD: 12.7 % — SIGNIFICANT CHANGE UP (ref 11.5–14.5)
RBC BLD AUTO: ABNORMAL
SAO2 % BLDV: 87 % — SIGNIFICANT CHANGE UP
SARS-COV-2 RNA SPEC QL NAA+PROBE: SIGNIFICANT CHANGE UP
SODIUM SERPL-SCNC: 137 MMOL/L — SIGNIFICANT CHANGE UP (ref 135–146)
SP GR SPEC: 1 — LOW (ref 1.01–1.02)
THC UR QL: NEGATIVE — SIGNIFICANT CHANGE UP
TROPONIN T SERPL-MCNC: <0.01 NG/ML — SIGNIFICANT CHANGE UP
UROBILINOGEN FLD QL: SIGNIFICANT CHANGE UP
WBC # BLD: 20.55 K/UL — HIGH (ref 4.8–10.8)
WBC # FLD AUTO: 20.55 K/UL — HIGH (ref 4.8–10.8)

## 2020-08-04 PROCEDURE — 93010 ELECTROCARDIOGRAM REPORT: CPT

## 2020-08-04 PROCEDURE — 99285 EMERGENCY DEPT VISIT HI MDM: CPT | Mod: 25

## 2020-08-04 PROCEDURE — 99404 PREV MED CNSL INDIV APPRX 60: CPT

## 2020-08-04 PROCEDURE — 99497 ADVNCD CARE PLAN 30 MIN: CPT

## 2020-08-04 PROCEDURE — 99223 1ST HOSP IP/OBS HIGH 75: CPT | Mod: 25

## 2020-08-04 PROCEDURE — 71045 X-RAY EXAM CHEST 1 VIEW: CPT | Mod: 26

## 2020-08-04 PROCEDURE — 99406 BEHAV CHNG SMOKING 3-10 MIN: CPT

## 2020-08-04 PROCEDURE — 93308 TTE F-UP OR LMTD: CPT | Mod: 26

## 2020-08-04 RX ORDER — SODIUM CHLORIDE 9 MG/ML
1000 INJECTION, SOLUTION INTRAVENOUS
Refills: 0 | Status: DISCONTINUED | OUTPATIENT
Start: 2020-08-04 | End: 2020-08-07

## 2020-08-04 RX ORDER — SODIUM CHLORIDE 9 MG/ML
2000 INJECTION, SOLUTION INTRAVENOUS ONCE
Refills: 0 | Status: COMPLETED | OUTPATIENT
Start: 2020-08-04 | End: 2020-08-04

## 2020-08-04 RX ORDER — ENOXAPARIN SODIUM 100 MG/ML
40 INJECTION SUBCUTANEOUS DAILY
Refills: 0 | Status: DISCONTINUED | OUTPATIENT
Start: 2020-08-04 | End: 2020-08-05

## 2020-08-04 RX ORDER — PANTOPRAZOLE SODIUM 20 MG/1
40 TABLET, DELAYED RELEASE ORAL
Refills: 0 | Status: DISCONTINUED | OUTPATIENT
Start: 2020-08-04 | End: 2020-08-07

## 2020-08-04 RX ORDER — CHLORHEXIDINE GLUCONATE 213 G/1000ML
1 SOLUTION TOPICAL
Refills: 0 | Status: DISCONTINUED | OUTPATIENT
Start: 2020-08-04 | End: 2020-08-07

## 2020-08-04 RX ORDER — METHADONE HYDROCHLORIDE 40 MG/1
50 TABLET ORAL
Qty: 0 | Refills: 0 | DISCHARGE

## 2020-08-04 RX ORDER — VANCOMYCIN HCL 1 G
1000 VIAL (EA) INTRAVENOUS EVERY 12 HOURS
Refills: 0 | Status: DISCONTINUED | OUTPATIENT
Start: 2020-08-04 | End: 2020-08-06

## 2020-08-04 RX ORDER — CEFEPIME 1 G/1
2000 INJECTION, POWDER, FOR SOLUTION INTRAMUSCULAR; INTRAVENOUS ONCE
Refills: 0 | Status: COMPLETED | OUTPATIENT
Start: 2020-08-04 | End: 2020-08-04

## 2020-08-04 RX ORDER — ONDANSETRON 8 MG/1
4 TABLET, FILM COATED ORAL EVERY 6 HOURS
Refills: 0 | Status: DISCONTINUED | OUTPATIENT
Start: 2020-08-04 | End: 2020-08-05

## 2020-08-04 RX ORDER — ONDANSETRON 8 MG/1
4 TABLET, FILM COATED ORAL ONCE
Refills: 0 | Status: COMPLETED | OUTPATIENT
Start: 2020-08-04 | End: 2020-08-04

## 2020-08-04 RX ORDER — CEFEPIME 1 G/1
2000 INJECTION, POWDER, FOR SOLUTION INTRAMUSCULAR; INTRAVENOUS EVERY 8 HOURS
Refills: 0 | Status: DISCONTINUED | OUTPATIENT
Start: 2020-08-05 | End: 2020-08-06

## 2020-08-04 RX ORDER — CEFEPIME 1 G/1
INJECTION, POWDER, FOR SOLUTION INTRAMUSCULAR; INTRAVENOUS
Refills: 0 | Status: DISCONTINUED | OUTPATIENT
Start: 2020-08-04 | End: 2020-08-06

## 2020-08-04 RX ORDER — ACETAMINOPHEN 500 MG
975 TABLET ORAL ONCE
Refills: 0 | Status: COMPLETED | OUTPATIENT
Start: 2020-08-04 | End: 2020-08-04

## 2020-08-04 RX ORDER — VANCOMYCIN HCL 1 G
1000 VIAL (EA) INTRAVENOUS ONCE
Refills: 0 | Status: COMPLETED | OUTPATIENT
Start: 2020-08-04 | End: 2020-08-04

## 2020-08-04 RX ADMIN — ONDANSETRON 4 MILLIGRAM(S): 8 TABLET, FILM COATED ORAL at 15:07

## 2020-08-04 RX ADMIN — SODIUM CHLORIDE 2000 MILLILITER(S): 9 INJECTION, SOLUTION INTRAVENOUS at 15:07

## 2020-08-04 RX ADMIN — Medication 975 MILLIGRAM(S): at 19:00

## 2020-08-04 RX ADMIN — Medication 975 MILLIGRAM(S): at 16:17

## 2020-08-04 RX ADMIN — Medication 250 MILLIGRAM(S): at 16:30

## 2020-08-04 RX ADMIN — CEFEPIME 100 MILLIGRAM(S): 1 INJECTION, POWDER, FOR SOLUTION INTRAMUSCULAR; INTRAVENOUS at 22:08

## 2020-08-04 RX ADMIN — SODIUM CHLORIDE 100 MILLILITER(S): 9 INJECTION, SOLUTION INTRAVENOUS at 22:08

## 2020-08-04 NOTE — H&P ADULT - NSHPPHYSICALEXAM_GEN_ALL_CORE
CONSTITUTIONAL: No acute distress, well-developed, well-groomed, AAOx3  HEAD: Atraumatic, normocephalic  EYES: EOM intact, PERRLA, conjunctiva and sclera clear  ENT: Supple, no masses, no thyromegaly, no bruits, no JVD; moist mucous membranes  PULMONARY: Clear to auscultation bilaterally; no wheezes, rales, or rhonchi  CARDIOVASCULAR: Regular rate and rhythm; no murmurs, rubs, or gallops  GASTROINTESTINAL: Soft, non-tender, non-distended; bowel sounds present  MUSCULOSKELETAL: 2+ peripheral pulses; no clubbing, no cyanosis, no edema  NEUROLOGY: non-focal  SKIN: track marks noted along bilateral forearms; warm and dry

## 2020-08-04 NOTE — ED PROVIDER NOTE - OBJECTIVE STATEMENT
26 yo F with PMHx of bipolar disorder, polysubstance abuse who presents with nausea, nbnb vomiting, watery diarrhea, inability to tolerate po x 2 days. No alleviating/aggravating factors. Thought she had "dope poisoning" so injected heroin and took methadone 15mg at 9am today but no improvement in sx so came to ED. No fever, chills, cp, sob, abd pain, dysuria, hematochezia, melena, vaginal bleeding/discharge, rash, sick contact. No other coingestion. No SI, HI, auditory/visual hallucinations. LMP 1 wk ago.

## 2020-08-04 NOTE — ED PROVIDER NOTE - CLINICAL SUMMARY MEDICAL DECISION MAKING FREE TEXT BOX
Feeling much better after hydration, vital signs normalized. No further vomiting. Abd NT. Pt continues alert and cooperative. WBC 20k with bandemia, concerned for bacteremia given no other source. Will admit for abx, ID consult, echo.

## 2020-08-04 NOTE — ED PROVIDER NOTE - NS ED ROS FT
GEN:  no fever, no chills, + generalized weakness  NEURO:  no headache, no dizziness  ENT: no sore throat, no runny nose  CV:  no chest pain, no palpitations  RESP:  no sob, no cough  GI:  + nausea, + vomiting, + abdominal pain, + diarrhea  :  no dysuria, no urinary frequency, no hematuria  MSK:  no joint pain, no edema  SKIN:  no rash, no bruising  HEME: no hematochezia, no melena

## 2020-08-04 NOTE — ED PROVIDER NOTE - CARE PLAN
Postpartum day 1    Patient reports: Doing well. Tolerating general diet. No N&V. Pain is well controlled on oral medication. Bleeding minimal. Denies SOB and CP with ambulation.    Visit Vitals  /60 (BP Location: RUE, Patient Position: Supine)   Pulse 72   Temp 97.6 °F (36.4 °C) (Oral)   Resp 16   Ht 5' 6\" (1.676 m)   Wt 76 kg   LMP 08/12/2018 (Exact Date)   SpO2 99%   Breastfeeding? Yes   BMI 27.04 kg/m²       Uterine fundus firm, non-tender.    Extremities: Non-tender, minimal  edema    HGB (g/dL)   Date Value   05/23/2019 9.1 (L)      HCT (%)   Date Value   05/23/2019 27.8 (L)        Impression: Doing well.    Plan: Routine postpartum care.  Postpartum depression discussed, warning signs reviewed.     Principal Discharge DX:	Sepsis

## 2020-08-04 NOTE — H&P ADULT - ATTENDING COMMENTS
Patient's mother present by bedside, patient states she prefers interview with mother present.    PHYSICAL EXAM:    CONSTITUTIONAL: NAD  ENMT: EOMI, PERRLA, No tonsillar erythema, exudates, or enlargement, neck supple, No JVD  PSYCH: Alert & Oriented X3, denies suidicial or homicidial ideation  RESPIRATORY: Clear to percussion bilaterally; No rales, rhonchi, wheezing, or rubs  CARDIOVASCULAR: Regular rate and rhythm; No murmurs, rubs, or gallops, negative edema  GASTROINTESTINAL: Soft, Nontender, Nondistended; Bowel sounds present  EXTREMITIES:  2+ Peripheral Pulses, No clubbing, cyanosis  SKIN: Bilateral forearm track marks     26 yo F with PMHx of Bipolar Disorder, Active IV Heroin Abuse since age 18 (states she uses 10 bags daily, last use 9AM on morning of presentation), Xanax Abuse (states she takes 2-5 tablets, unknown dosage, took 2 tablets this past Sunday), Active Smoker presented with complaint of nausea with nonblood, nonbilious vomiting since evening prior to admission associated with subjective malaise and fevers. Patient attributed symptoms to heroin withdrawal therefore took 15mg of her aunt's Methadone on day of presentation as well. Patient resides with her uncle, uncle notified mother of patient's symptoms thereby prompting ED visit. Patient's mother reports she herself had recent and lengthy admission for 3 months duration at [facility] for Endocarditis, mother actively abuses IV heroin as well, was concern daughter may also have endocarditis. ROS revealing of depression, though patient denies suicidal ideations, states her "dreams are better than real life".     #Sepsis present on admission, differential includes Bacteremia, Endocarditis: s/p 2L LR bolus in ED, s/p Vancomycin, blood cultures drawn, bedside echo unrevealing of vegetations, COVID-19 negative, continue endocarditis prophylaxis, awaiting 2D-Echo, ID evaluation, f/u HIV and Hep C result, urine and drug screen    #IV Heroin and Xanax Abuse, Active Smoker, Depression: Awaiting Addiction and Behavioral Health assessment, polysubstance abuse cessation counseled, mother states she is williing to enter detox with her daughter and enroll in Methadone program states they are best friends and would adhere, COWS monitoring, symptomatic treatment if signs of early withdrawal, tobacco cessation counseled, patient declined nicotine patch     #Abnormal EKG, new T-wave inversions in anterior leads: Troponin negative, f/u repeat level, AM EKG, patient denies chest pain    Patient is full code.    Disposition: Acute

## 2020-08-04 NOTE — SBIRT NOTE ADULT - NSSBIRTDRGPASSREFTXDET_GEN_A_CORE
Screening results were reviewed with the patient and patient was provided information about healthy guidelines and potential negative consequences associated with level of risk. Motivation and readiness to reduce or stop use was discussed and  goals and activities to make changes were suggested/offered. Options discussed for further evaluation and treatment, but referral to treatment was not completed because patient requires medical care. Health  provided patient with follow up information for [facility]MTP and telephonic SBIRT. Health  will update CATCH team re: patient's inpatient status.

## 2020-08-04 NOTE — PATIENT PROFILE ADULT - NSPROHMSYMPCOND_GEN_A_NUR
Bipolar disorder  Suicide attempt  Heroin overdose  Nicotine dependence  Cannabis dependence  Methadone dependence  Opiate abuse, continuous  Eating disorder  IV drug abuse

## 2020-08-04 NOTE — H&P ADULT - NSHPSOCIALHISTORY_GEN_ALL_CORE
Marital Status: single  Living Situation: lives with uncle  Occupation: unemployed  Tobacco Use: active smoker, smoked 0.5ppd for past 9 years  Alcohol Use: denies  Drug Use: IV heroin (uses 10 bags/day), marijuana, methadone (took 15mg on day of presentation, gets it from her aunt who is currently pregnant and not taking it), Xanax (takes up to 5mg daily, last took 2mg day prior to presentation)  Sexual History: sexually active, currently in monogamous relationship, denies any history of sexually transmitted diseases  Functional Status: full functional in all ADLs and IADLs

## 2020-08-04 NOTE — H&P ADULT - HISTORY OF PRESENT ILLNESS
[25y woman]    CC: nausea and vomiting    PMH: IV drug use, polysubstance use disorder (heroin, marijuana, methadone, alprazolam), bipolar disorder with previous suicide attempt, active smoker    PSH: no significant past surgical history    History of present illness goes back to 2 days ago when the patient woke up with severe nausea and non-bloody, non-bilious vomiting. She claims to have thrown up ~20 times over the past 2 days. The emesis was yellow and orange in color. She tried "taking sips of water but it came right back up." She does not recall eating anything differently over the past week and no one else around her has been sick. She initially thought it was a bad batch of heroin, so she injected herself with heroin and took methadone 15mg PO at 9am on the day of presentation. Her symptoms did not improve, so she came to the ED.    In the ED, vital signs were Tmax 100.7F, , /60, RR 18, SpO2 96% on room air. Patient received vancomycin 1g IV, 2L bolus of LR, Tylenol 975mg PO, and Zofran 4mg IV. A bedside echo was performed for suspected infective endocarditis, but no vegetations were noted. ECG showed T wave inversions in leads V1-V4.

## 2020-08-04 NOTE — ED PROVIDER NOTE - PROGRESS NOTE DETAILS
TC: 26 yo F TC: 24 yo F with PMHx of bipolar disorder, polysubstance abuse who presents with nbnb vomiting, diarrhea, inability to tolerate po x 2 days. +IVDU. Here in ED, oral temp 99.6 will obtain rectal temp, tachy 110s. No acute distress. No focal neuro deficits. Ordered labs, ekg, cxr, urine. Given IVF. Will reassess. TC: Rectal temp 100.7. HR improved to 80s. WBC 20. Added vancomycin for endocarditis ppx. Pt amenable to HIV and hep C testing. TC: Rectal temp 100.7. HR improved to 80s. WBC 20. Added vancomycin for endocarditis coverage. Pt amenable to HIV and hep C testing. TC: Pt reports nausea improved. Rest of labs wnl. Ekg with new TWI in anterior leads new compared to prior, likely 2/2 demand ischemia. Pending urine, cxr. TC: Cxr negative. Ua negative. Covid negative. HR down to 70s. BP 98/57, MAP 69. Well appearing. Social work saw pt, pt not ready to quit, CATCH will follow as inpt. Will admit. TC: Attempted to call MAR multiple times over past 50 min to sign out pt, line busy every time. Will attempt again. Spoke with psych CATCH team for continued f/u for substance use disorder.

## 2020-08-04 NOTE — ED PROVIDER NOTE - PHYSICAL EXAMINATION
CONSTITUTIONAL: well developed, nontoxic appearing, in no acute distress, speaking in full sentences  SKIN: warm, dry, no rash, no splinter hemorrhages, no janeway lesions, no osler nodes, cap refill < 2 seconds  HEENT: normocephalic, atraumatic, no conjunctival erythema, moist mucous membranes, patent airway, PERRL @6mm  NECK: supple  CV:  tachycardic rate, regular rhythm, 2+ radial pulses bilaterally  RESP: no wheezes, no rales, no rhonchi, normal work of breathing  ABD: soft, nontender, nondistended, no rebound, no guarding  MSK: normal ROM, no cyanosis, no edema  NEURO: alert, oriented, grossly unremarkable  PSYCH: cooperative, appropriate CONSTITUTIONAL: well developed, nontoxic appearing, in no acute distress, speaking in full sentences  SKIN: warm, dry, track marks along bilateral arms, no splinter hemorrhages, no janeway lesions, no osler nodes, cap refill < 2 seconds  HEENT: normocephalic, atraumatic, no conjunctival erythema, moist mucous membranes, patent airway, PERRL @6mm  NECK: supple  CV:  tachycardic rate, regular rhythm, 2+ radial pulses bilaterally  RESP: no wheezes, no rales, no rhonchi, normal work of breathing  ABD: soft, nontender, nondistended, no rebound, no guarding  MSK: normal ROM, no cyanosis, no edema  NEURO: alert, oriented, grossly unremarkable  PSYCH: cooperative, appropriate

## 2020-08-04 NOTE — H&P ADULT - ASSESSMENT
Patient is a 26yo female with PMH of IV drug use, polysubstance use disorder (heroin, marijuana, methadone, alprazolam), bipolar disorder with previous suicide attempt, and active smoker who presented to the ED complaining of nausea and vomiting of 2 days duration. Patient was admitted for suspected infective endocarditis.    #Sepsis secondary to suspected infective endocarditis due to IV drug use vs gastroenteritis (less likely)  - Sepsis present on admission (WBC 20, Tmax 100.7F, , RR 18, lactate 1.3)  - Patient is active IV heroin user and admits to occasionally repeat using needles  - Bedside echo in ED did not show any evidence of vegetations  - Received vancomycin 1g IV, 2L bolus of LR in ED  - Start LR at 100mL/hr  - Start vancomycin 1g IV Q12 and cefepime 2g IV Q8H  - ID consult pending  - Repeat 2D echocardiogram  - Follow blood and urine cultures  - Start patient on full liquid diet. Advance as tolerated    #T wave inversions on ECG  - ECG in ED showed T wave inversions in leads V-V4, which are not present when compared to previous ECG in 2018  - Patient denies any chest pain or dyspnea on exertion  - Troponin negative x1  - Repeat troponin and ECG in AM  - Follow 2D echocardiogram    #Polysubstance use disorder  - Most recently used heroin and methadone at 9am on day of presentation  - Monitor patient for signs of withdrawal (COWS)  - Pending CATCH and psychiatry evaluation  - Follow acute hepatitis panel, HIV screening, urine drug screen    #History of bipolar disorder with previous suicide attempt  - Denies suicidal ideation at this time  - Not currently on medication outpatient  - Pending psychiatry evaluation    #Nicotine dependence  - Counseled patient on smoking cessation  - Patient declined tobacco cessation medication at this time     #Misc  - DVT Prophylaxis: Lovenox 40mg SQ QD   - GI Prophylaxis: pantoprazole 40mg PO QD   - Diet: Full liquids, advance as tolerated  - Activity: ambulate as tolerated  - IV Fluids: LR at 100mL/hr  - Code Status: Full Code    Dispo: admit to medicine, pending echocardiogram, blood cultures, and ID consult

## 2020-08-04 NOTE — ED PROCEDURE NOTE - ATTENDING CONTRIBUTION TO CARE
I supervised this Ultrasound guided peripheral IV placement. Confirmation of line obtained with Ultrasound and image saved. No complications form procedure and line is functioning well.
No visible valve vegetations  I was physically present and helped performed this Ultrasound.  I supervised all views obtained and reviewed images in real time. I agree with findings documented. Results of Ultrasound discussed with patient.

## 2020-08-04 NOTE — H&P ADULT - NSICDXPASTMEDICALHX_GEN_ALL_CORE_FT
PAST MEDICAL HISTORY:  Bipolar disorder     Cannabis dependence     Eating disorder Previous history of bulimia nervosa    Heroin overdose     IV drug abuse     Methadone dependence     Nicotine dependence     Opiate abuse, continuous     Suicide attempt

## 2020-08-04 NOTE — ED PROVIDER NOTE - PMH
Bipolar disorder    Cannabis dependence    Dental disorder  pt awaiting root canal to left upper tooth  Eating disorder  pt states she is bulemic.  Heroin overdose    IV drug abuse  pt currently taking 70 mg daily from methadone clinic on [address].  Missed today's dose.  Methadone dependence    Nicotine dependence    Opiate abuse, continuous    Suicide attempt

## 2020-08-04 NOTE — H&P ADULT - NSHPLABSRESULTS_GEN_ALL_CORE
13.2   20.55 )-----------( 265      ( 04 Aug 2020 15:10 )             40.2     -    137  |  96<L>  |  13  ----------------------------<  114<H>  3.8   |  26  |  0.8    Ca    9.6      04 Aug 2020 15:10    TPro  7.2  /  Alb  4.2  /  TBili  0.8  /  DBili  x   /  AST  35  /  ALT  41  /  AlkPhos  115  08-04    Urinalysis Basic - ( 04 Aug 2020 18:13 )    Color: Colorless / Appearance: Clear / S.004 / pH: x  Gluc: x / Ketone: Negative  / Bili: Negative / Urobili: <2 mg/dL   Blood: x / Protein: Negative / Nitrite: Negative   Leuk Esterase: Negative / RBC: x / WBC x   Sq Epi: x / Non Sq Epi: x / Bacteria: x    Troponin T, Serum: <0.01 ng/mL (20 @ 15:10)    CARDIAC MARKERS ( 04 Aug 2020 15:10 )  x     / <0.01 ng/mL / x     / x     / x        < from: 12 Lead ECG (20 @ 14:42) >  Ventricular Rate 97 BPM  Atrial Rate 97 BPM  P-R Interval 124 ms  QRS Duration 84 ms  Q-T Interval 340 ms  QTC Calculation(Bezet) 431 ms  P Axis 42 degrees  R Axis 37 degrees  T Axis 18 degrees    Diagnosis Line Normal sinus rhythm  Possible Left atrial enlargement  T wave abnormality, consider anterior ischemia  Abnormal ECG    < end of copied text >

## 2020-08-04 NOTE — H&P ADULT - NSHPREVIEWOFSYSTEMS_GEN_ALL_CORE
CONSTITUTIONAL: (+) fevers, No weakness or chills; No headaches  EYES: No visual changes, eye pain, or discharge  ENT: No vertigo; No ear pain or change in hearing; No sore throat or difficulty swallowing  NECK: No pain or stiffness  RESPIRATORY: No cough, wheezing, or hemoptysis; No shortness of breath  CARDIOVASCULAR: No chest pain or palpitations  GASTROINTESTINAL: No abdominal or epigastric pain; (+) nausea and vomiting, no hematemesis; No diarrhea or constipation; No melena or hematochezia  GENITOURINARY: No dysuria, frequency or hematuria  MUSCULOSKELETAL: No joint pain, no muscle pain, no weakness  NEUROLOGICAL: No numbness or weakness  SKIN: No itching or rashes

## 2020-08-04 NOTE — ED PROVIDER NOTE - ATTENDING CONTRIBUTION TO CARE
26yo woman h/o bipolar d/o, polysubstance abuse c/o nausea, vomiting, diarrhea x 2 days. She is currently using heroin daily but thought her sx were withdrawal, so took another bag today followed by methadone, but no improvement so came to the ED. She denies URI sx, no urinary sx, no vaginal discharge, skin rash or lesions, back pain. On exam she is febrile and tachycardic, but alert and nontoxic appearing, FROM neck, lungs CTA, CVS1S2 RRR tachy, abd soft, NT, ND. No rash though multiple injection sites are noted on arms. Concerned for bacteremia/endocarditis vs other infectious source, will cover with abx, hydrate, fever control, admit for further workup.

## 2020-08-04 NOTE — ED ADULT NURSE NOTE - CHIEF COMPLAINT QUOTE
nausea, and vomiting x 2 days, uses heroin everyday, last use was this morning with 3 bags, also took methadone 15mg, denies SI and HI.

## 2020-08-04 NOTE — ED ADULT NURSE REASSESSMENT NOTE - NS ED NURSE REASSESS COMMENT FT1
Pt not in assigned area x1 hr. Educated pt on importance in staying in assigned area. Pt ambulating well, w/ mother.

## 2020-08-05 DIAGNOSIS — F13.20 SEDATIVE, HYPNOTIC OR ANXIOLYTIC DEPENDENCE, UNCOMPLICATED: ICD-10-CM

## 2020-08-05 DIAGNOSIS — F11.23 OPIOID DEPENDENCE WITH WITHDRAWAL: ICD-10-CM

## 2020-08-05 LAB
AMPHET UR-MCNC: NEGATIVE — SIGNIFICANT CHANGE UP
ANION GAP SERPL CALC-SCNC: 12 MMOL/L — SIGNIFICANT CHANGE UP (ref 7–14)
BARBITURATES UR SCN-MCNC: NEGATIVE — SIGNIFICANT CHANGE UP
BASOPHILS # BLD AUTO: 0.05 K/UL — SIGNIFICANT CHANGE UP (ref 0–0.2)
BASOPHILS NFR BLD AUTO: 0.3 % — SIGNIFICANT CHANGE UP (ref 0–1)
BENZODIAZ UR-MCNC: NEGATIVE — SIGNIFICANT CHANGE UP
BUN SERPL-MCNC: 10 MG/DL — SIGNIFICANT CHANGE UP (ref 10–20)
CALCIUM SERPL-MCNC: 9 MG/DL — SIGNIFICANT CHANGE UP (ref 8.5–10.1)
CHLORIDE SERPL-SCNC: 101 MMOL/L — SIGNIFICANT CHANGE UP (ref 98–110)
CO2 SERPL-SCNC: 24 MMOL/L — SIGNIFICANT CHANGE UP (ref 17–32)
COCAINE METAB.OTHER UR-MCNC: NEGATIVE — SIGNIFICANT CHANGE UP
CREAT SERPL-MCNC: 0.6 MG/DL — LOW (ref 0.7–1.5)
DRUG SCREEN 1, URINE RESULT: SIGNIFICANT CHANGE UP
EOSINOPHIL # BLD AUTO: 0.03 K/UL — SIGNIFICANT CHANGE UP (ref 0–0.7)
EOSINOPHIL NFR BLD AUTO: 0.2 % — SIGNIFICANT CHANGE UP (ref 0–8)
FENTANYL UR QL: POSITIVE
GLUCOSE SERPL-MCNC: 80 MG/DL — SIGNIFICANT CHANGE UP (ref 70–99)
HAV IGM SER-ACNC: SIGNIFICANT CHANGE UP
HBV CORE IGM SER-ACNC: SIGNIFICANT CHANGE UP
HBV SURFACE AG SER-ACNC: SIGNIFICANT CHANGE UP
HCT VFR BLD CALC: 39 % — SIGNIFICANT CHANGE UP (ref 37–47)
HCV AB S/CO SERPL IA: 0.05 COI — SIGNIFICANT CHANGE UP
HCV AB S/CO SERPL IA: 0.13 S/CO — SIGNIFICANT CHANGE UP (ref 0–0.99)
HCV AB SERPL-IMP: SIGNIFICANT CHANGE UP
HCV AB SERPL-IMP: SIGNIFICANT CHANGE UP
HGB BLD-MCNC: 12.4 G/DL — SIGNIFICANT CHANGE UP (ref 12–16)
IMM GRANULOCYTES NFR BLD AUTO: 0.5 % — HIGH (ref 0.1–0.3)
LYMPHOCYTES # BLD AUTO: 1.29 K/UL — SIGNIFICANT CHANGE UP (ref 1.2–3.4)
LYMPHOCYTES # BLD AUTO: 8.3 % — LOW (ref 20.5–51.1)
MAGNESIUM SERPL-MCNC: 1.9 MG/DL — SIGNIFICANT CHANGE UP (ref 1.8–2.4)
MCHC RBC-ENTMCNC: 27.8 PG — SIGNIFICANT CHANGE UP (ref 27–31)
MCHC RBC-ENTMCNC: 31.8 G/DL — LOW (ref 32–37)
MCV RBC AUTO: 87.4 FL — SIGNIFICANT CHANGE UP (ref 81–99)
METHADONE UR-MCNC: POSITIVE
MONOCYTES # BLD AUTO: 0.77 K/UL — HIGH (ref 0.1–0.6)
MONOCYTES NFR BLD AUTO: 5 % — SIGNIFICANT CHANGE UP (ref 1.7–9.3)
NEUTROPHILS # BLD AUTO: 13.25 K/UL — HIGH (ref 1.4–6.5)
NEUTROPHILS NFR BLD AUTO: 85.7 % — HIGH (ref 42.2–75.2)
NRBC # BLD: 0 /100 WBCS — SIGNIFICANT CHANGE UP (ref 0–0)
OPIATES UR-MCNC: POSITIVE
PCP UR-MCNC: NEGATIVE — SIGNIFICANT CHANGE UP
PLATELET # BLD AUTO: 235 K/UL — SIGNIFICANT CHANGE UP (ref 130–400)
POTASSIUM SERPL-MCNC: 3.9 MMOL/L — SIGNIFICANT CHANGE UP (ref 3.5–5)
POTASSIUM SERPL-SCNC: 3.9 MMOL/L — SIGNIFICANT CHANGE UP (ref 3.5–5)
PROPOXYPHENE QUALITATIVE URINE RESULT: NEGATIVE — SIGNIFICANT CHANGE UP
RBC # BLD: 4.46 M/UL — SIGNIFICANT CHANGE UP (ref 4.2–5.4)
RBC # FLD: 12.7 % — SIGNIFICANT CHANGE UP (ref 11.5–14.5)
SODIUM SERPL-SCNC: 137 MMOL/L — SIGNIFICANT CHANGE UP (ref 135–146)
THC UR QL: NEGATIVE — SIGNIFICANT CHANGE UP
TROPONIN T SERPL-MCNC: <0.01 NG/ML — SIGNIFICANT CHANGE UP
WBC # BLD: 15.46 K/UL — HIGH (ref 4.8–10.8)
WBC # FLD AUTO: 15.46 K/UL — HIGH (ref 4.8–10.8)

## 2020-08-05 PROCEDURE — 93010 ELECTROCARDIOGRAM REPORT: CPT

## 2020-08-05 PROCEDURE — 99233 SBSQ HOSP IP/OBS HIGH 50: CPT

## 2020-08-05 RX ORDER — ALPRAZOLAM 0.25 MG
0.5 TABLET ORAL EVERY 8 HOURS
Refills: 0 | Status: DISCONTINUED | OUTPATIENT
Start: 2020-08-05 | End: 2020-08-06

## 2020-08-05 RX ORDER — METHADONE HYDROCHLORIDE 40 MG/1
30 TABLET ORAL DAILY
Refills: 0 | Status: DISCONTINUED | OUTPATIENT
Start: 2020-08-05 | End: 2020-08-06

## 2020-08-05 RX ORDER — ONDANSETRON 8 MG/1
4 TABLET, FILM COATED ORAL EVERY 8 HOURS
Refills: 0 | Status: DISCONTINUED | OUTPATIENT
Start: 2020-08-05 | End: 2020-08-07

## 2020-08-05 RX ORDER — MORPHINE SULFATE 50 MG/1
2 CAPSULE, EXTENDED RELEASE ORAL ONCE
Refills: 0 | Status: DISCONTINUED | OUTPATIENT
Start: 2020-08-05 | End: 2020-08-05

## 2020-08-05 RX ADMIN — MORPHINE SULFATE 2 MILLIGRAM(S): 50 CAPSULE, EXTENDED RELEASE ORAL at 08:23

## 2020-08-05 RX ADMIN — CEFEPIME 100 MILLIGRAM(S): 1 INJECTION, POWDER, FOR SOLUTION INTRAMUSCULAR; INTRAVENOUS at 06:05

## 2020-08-05 RX ADMIN — Medication 250 MILLIGRAM(S): at 16:59

## 2020-08-05 RX ADMIN — METHADONE HYDROCHLORIDE 30 MILLIGRAM(S): 40 TABLET ORAL at 14:52

## 2020-08-05 RX ADMIN — Medication 0.5 MILLIGRAM(S): at 22:32

## 2020-08-05 RX ADMIN — CEFEPIME 100 MILLIGRAM(S): 1 INJECTION, POWDER, FOR SOLUTION INTRAMUSCULAR; INTRAVENOUS at 13:47

## 2020-08-05 RX ADMIN — PANTOPRAZOLE SODIUM 40 MILLIGRAM(S): 20 TABLET, DELAYED RELEASE ORAL at 06:05

## 2020-08-05 RX ADMIN — MORPHINE SULFATE 2 MILLIGRAM(S): 50 CAPSULE, EXTENDED RELEASE ORAL at 11:14

## 2020-08-05 RX ADMIN — Medication 250 MILLIGRAM(S): at 06:05

## 2020-08-05 RX ADMIN — CEFEPIME 100 MILLIGRAM(S): 1 INJECTION, POWDER, FOR SOLUTION INTRAMUSCULAR; INTRAVENOUS at 23:01

## 2020-08-05 RX ADMIN — Medication 0.5 MILLIGRAM(S): at 11:17

## 2020-08-05 NOTE — BEHAVIORAL HEALTH ASSESSMENT NOTE - COMMENTS ON SUICIDE RISK/PROTECTIVE FACTORS:
Risk factors include substance use disorders, history of suicide attempts, Risk factors include substance use disorders, history of suicide attempts, anxiety, sleep disturbance, acute medical illness; protective factors include no acute suicidality, social support from boyfriend and father, feelings of responsibility towards 5 year old son, willing to proceed with treatment. Based on above, chronically elevated risk but no current indication for nonvoluntary IPP admission.

## 2020-08-05 NOTE — BEHAVIORAL HEALTH ASSESSMENT NOTE - NS ED BHA REVIEW OF ED CHART AVAILABLE IMAGING REVIEWED
Annual exam ages 40-58    Hong Hsu is a ,  64 y.o. female [facility] Patient's last menstrual period was 2012. .    She presents for her annual checkup. She is having no significant problems. With regard to the Gardasil vaccine, she is older than the age for which it is FDA approved. Menstrual status:    Her periods are nonexistent due to postmenopausal status in flow. She denies dysmenorrhea. She reports no premenstrual symptoms. Contraception:    The current method of family planning is post menopausal status. Sexual history:    She  reports that she currently engages in sexual activity and has had male partners. She reports using the following method of birth control/protection: None. Medical conditions:    Since her last annual GYN exam about one year ago, she has not the following changes in her health history: right knee needs replacement. Pap and Mammogram History:    Her most recent Pap smear was normal, obtained 5 year(s) ago. The patient has not had a recent mammogram.    Breast Cancer History/Substance Abuse: positive in her mother. Osteoporosis History:    Family history does not include a first or second degree relative with osteopenia or osteoporosis. A bone density scan was not obtained. She is not currently taking calcium and vit D. Past Medical History   Diagnosis Date    Asthma     Depression with anxiety 10/19/2015    Hypercholesterolemia     Hypertension     Increased risk of breast cancer     Pap smear for cervical cancer screening 11,8/24/10     Normal Pap, Neg HPV     Past Surgical History   Procedure Laterality Date    Hx  section       2 C-sections    Hx orthopaedic  2005     Right Carpal tunnel surgery    Hx other surgical  2010     Shoulder Surgery       Current Outpatient Prescriptions   Medication Sig Dispense Refill    escitalopram oxalate (LEXAPRO) 10 mg tablet Take 1 Tab by mouth daily.  30 Tab 0  FLUoxetine (PROZAC) 10 mg capsule TAKE TWO CAPSULES BY MOUTH EVERY  Cap 2    amLODIPine (NORVASC) 5 mg tablet Take 1 Tab by mouth daily. 90 Tab 2    bisoprolol-hydrochlorothiazide (ZIAC) 10-6.25 mg per tablet Take 1 Tab by mouth daily. 90 Tab 2    simvastatin (ZOCOR) 20 mg tablet Take 1 Tab by mouth daily. 90 Tab 2    albuterol (VENTOLIN HFA) 90 mcg/actuation inhaler Take 1 Puff by inhalation every six (6) hours as needed for Wheezing or Shortness of Breath. 1 Inhaler 2    JOLESSA 0.15-30 mg-mcg per tablet TAKE ONE TABLET BY MOUTH EVERY DAY 91 Tab 0    ergocalciferol (VITAMIN D2) 50,000 unit capsule Take 1 Cap by mouth every seven (7) days. 5 Cap 5     Allergies: Codeine     Tobacco History:  reports that she has never smoked. She has never used smokeless tobacco.  Alcohol Abuse:  reports that she drinks alcohol. Drug Abuse:  reports that she does not use illicit drugs.     Family Medical/Cancer History:   Family History   Problem Relation Age of Onset    Diabetes Mother      Type II    Heart Disease Mother     Breast Cancer Mother     Heart Disease Father     Heart Disease Maternal Grandmother     Heart Disease Maternal Grandfather         Review of Systems - History obtained from the patient  Constitutional: negative for weight loss, fever, night sweats  HEENT: negative for hearing loss, earache, congestion, snoring, sorethroat  CV: negative for chest pain, palpitations, edema  Resp: negative for cough, shortness of breath, wheezing  GI: negative for change in bowel habits, abdominal pain, black or bloody stools  : negative for frequency, dysuria, hematuria, vaginal discharge  MSK: negative for back pain, joint pain, muscle pain  Breast: negative for breast lumps, nipple discharge, galactorrhea  Skin :negative for itching, rash, hives  Neuro: negative for dizziness, headache, confusion, weakness  Psych: negative for anxiety, depression, change in mood  Heme/lymph: negative for bleeding, bruising, pallor    Physical Exam    Visit Vitals    /80 (BP 1 Location: Right arm, BP Patient Position: Sitting)    Resp 18    Ht 5' 3\" (1.6 m)    Wt 225 lb (102.1 kg)    LMP 06/06/2012    BMI 39.86 kg/m2       Constitutional  · Appearance: well-nourished, well developed, alert, in no acute distress    HENT  · Head and Face: appears normal    Neck  · Inspection/Palpation: normal appearance, no masses or tenderness  · Lymph Nodes: no lymphadenopathy present  · Thyroid: gland size normal, nontender, no nodules or masses present on palpation    Chest  · Respiratory Effort: breathing unlabored  · Auscultation: normal breath sounds    Cardiovascular  · Heart:  · Auscultation: regular rate and rhythm without murmur    Breasts  · Inspection of Breasts: breasts symmetrical, no skin changes, no discharge present, nipple appearance normal, no skin retraction present  · Palpation of Breasts and Axillae: no masses present on palpation, no breast tenderness  · Axillary Lymph Nodes: no lymphadenopathy present    Gastrointestinal  · Abdominal Examination: abdomen non-tender to palpation, normal bowel sounds, no masses present  · Liver and spleen: no hepatomegaly present, spleen not palpable  · Hernias: no hernias identified    Skin  · General Inspection: no rash, no lesions identified    Neurologic/Psychiatric  · Mental Status:  · Orientation: grossly oriented to person, place and time  · Mood and Affect: mood normal, affect appropriate    Genitourinary  · External Genitalia: normal appearance for age, no discharge present, no tenderness present, no inflammatory lesions present, no masses present, no atrophy present  · Vagina: normal vaginal vault without central or paravaginal defects, no discharge present, no inflammatory lesions present, no masses present  · Bladder: non-tender to palpation  · Urethra: appears normal  · Cervix: normal   · Uterus: normal size, shape and consistency  · Adnexa: no adnexal tenderness present, no adnexal masses present  · Perineum: perineum within normal limits, no evidence of trauma, no rashes or skin lesions present  · Anus: anus within normal limits, no hemorrhoids present  · Inguinal Lymph Nodes: no lymphadenopathy present    Assessment:  Routine gynecologic examination  Her current medical status is satisfactory with no evidence of significant gynecologic issues.     Plan:  Counseled re: diet, exercise, healthy lifestyle  Return for yearly wellness visits  Rec annual mammogram Yes

## 2020-08-05 NOTE — PROGRESS NOTE ADULT - ASSESSMENT
26yo female with PMH of IV drug use, polysubstance use disorder (heroin, marijuana, methadone, alprazolam), bipolar disorder with previous suicide attempt, and active smoker who presented to the ED complaining of nausea and vomiting of 2 days duration.       #Sepsis secondary to suspected infective endocarditis due to IV drug use  Sepsis present on admission (WBC 20, Tmax 100.7F, , RR 18, lactate 1.3)  Patient is active IV heroin user and admits to occasionally repeat using needles  WBC trending down and pt remains afebrile   - continue vancomycin 1g IV Q12 and cefepime 2g IV Q8H  - cont LR   - ID consult f/u   - 2D echocardiogram pending   - f/u blood and urine cultures. will repeat bcx today   - monitor fever curve     #T wave inversions on ECG  ECG in ED showed T wave inversions in leads V-V4, which are not present when compared to previous ECG in 2018  Patient denies any chest pain or dyspnea on exertion  - Troponin negative x2  - Follow 2D echocardiogram    #Polysubstance use disorder  Most recently used heroin and methadone at 9am on day of presentation  -cont to monitor patient for signs of withdrawal (COWS)  -seen by CATCH team attending: will start methadone 30mg QD  -start xanax 0.5mg TID PRN given pt takes high doses are home       #History of bipolar disorder with previous suicide attempt  - Denies suicidal ideation at this time  - Not currently on medication outpatient  - f/u psychiatry evaluation for medication recommendation likely needs to establish care as an outpt    #Nicotine dependence  - nicotine patch while admitted     Progress Note Handoff  Pending:  bcx, echo, symptomatic improvement  Patient/Family discussion: spoke with pt at bedside she is agreeable to POC and remaining in the hospital for further mgmt  Disposition: from home

## 2020-08-05 NOTE — PROGRESS NOTE ADULT - SUBJECTIVE AND OBJECTIVE BOX
JOSÉ MANUEL JI  25y, Female  Allergy: fish (Hives)  pentazocine (Other)  phenobarbital (Rash)  Hospital Day: 1d      Patient was seen and examined at bedside. denies fevers, chills, CP, SOB, palpitations, abdominal pain. states that her nausea is present but denies further episodes of emesis. anxious about withdrawing as she uses heroin daily         VITALS:  T(F): 98.9 (20 @ 13:57), Max: 98.9 (20 @ 13:57)  HR: 93 (20 @ 13:57)  BP: 102/68 (20 @ 13:57) (97/65 - 107/64)  RR: 20 (20 @ 13:57)  SpO2: 98% (20 @ 22:11)    PHYSICAL EXAM:  GENERAL: well developed well nourished in no acute distress  NECK: No evidence of swelling no palpable lymphadenopathy  CHEST/LUNG: clear to ausculation bilaterally no wheezes, rales, or rhonchi   HEART/VASC: RRR, No murmurs, rubs, or gallops appreciated, 2+ equal bilateral radial pulse  ABDOMEN: Soft, non tender non distended +bowel sounds in all quadrants, no palpable organomegaly   EXTREMITIES:  No clubbing and range of motion intact     TESTS & MEASUREMENTS:  Weight (Kg): 65.1 (20 @ 01:00)  BMI (kg/m2): 25.4 ()                          12.4   15.46 )-----------( 235      ( 05 Aug 2020 05:21 )             39.0           137  |  101  |  10  ----------------------------<  80  3.9   |  24  |  0.6<L>    Ca    9.0      05 Aug 2020 05:21  Mg     1.9         TPro  7.2  /  Alb  4.2  /  TBili  0.8  /  DBili  x   /  AST  35  /  ALT  41  /  AlkPhos  115  08-    LIVER FUNCTIONS - ( 04 Aug 2020 15:10 )  Alb: 4.2 g/dL / Pro: 7.2 g/dL / ALK PHOS: 115 U/L / ALT: 41 U/L / AST: 35 U/L / GGT: x           CARDIAC MARKERS ( 05 Aug 2020 05:21 )  x     / <0.01 ng/mL / x     / x     / x      CARDIAC MARKERS ( 04 Aug 2020 15:10 )  x     / <0.01 ng/mL / x     / x     / x            Urinalysis Basic - ( 04 Aug 2020 18:13 )    Color: Colorless / Appearance: Clear / S.004 / pH: x  Gluc: x / Ketone: Negative  / Bili: Negative / Urobili: <2 mg/dL   Blood: x / Protein: Negative / Nitrite: Negative   Leuk Esterase: Negative / RBC: x / WBC x   Sq Epi: x / Non Sq Epi: x / Bacteria: x      MEDICATIONS:  MEDICATIONS  (STANDING):  cefepime   IVPB 2000 milliGRAM(s) IV Intermittent every 8 hours  cefepime   IVPB      chlorhexidine 4% Liquid 1 Application(s) Topical <User Schedule>  lactated ringers. 1000 milliLiter(s) (100 mL/Hr) IV Continuous <Continuous>  methadone    Tablet 30 milliGRAM(s) Oral daily  pantoprazole    Tablet 40 milliGRAM(s) Oral before breakfast  vancomycin  IVPB 1000 milliGRAM(s) IV Intermittent every 12 hours    MEDICATIONS  (PRN):  ALPRAZolam 0.5 milliGRAM(s) Oral every 8 hours PRN anexity  ondansetron    Tablet 4 milliGRAM(s) Oral every 8 hours PRN Nausea and/or Vomiting

## 2020-08-05 NOTE — BEHAVIORAL HEALTH ASSESSMENT NOTE - SUMMARY
Ms. Russell is a 25-year-old, white, female, domiciled in [facility] residence with uncle, high school educated at [employer], mother of a 5-year-old son, in romantic relationship, without known significant past medical history, psychiatric history of opiate use disorder, benzodiazapine use disorder, history of suicidality (age 15), admitted to medicine for suspected infective endocarditis, psychiatry consulted for depression.     Based on the assessment; impression: opiate use disorder, severe, currently dependent, in controlled setting; bzd use disorder, severe, in controlled setting, rule out PTSD, rule out KELVIN. Based on assessment there does not appear to be a history of bipolarity, though this needs further longitudinal assessment. Although patient endorses anxiety and demoralization over relapse, she does not currently meet criteria for a depressive episode.    Discussed treatment options of substance use disorder with Ms. Russell, at this time preference is for inpatient rehab and then for MAT at [facility]. Due to this, requests treatment at inpatient rehab that will accept methadone maintenance throughout rehab treatment, though she is aware this may not be available.     Given heavy BZD use she is at significantly elevated risk for complicated withdrawal.     Recommend:   D/C PRN Xanax   Initiate Ativan CIWA Protocol   Continue Methadone   CATCH Team to follow

## 2020-08-05 NOTE — CONSULT NOTE ADULT - ATTENDING COMMENTS
24 yo F with PMH of IV drug use and polysubtance abuse, bipolar disorder with previous suicide attempt who presents with nausea and vomiting. She started feeling unwell 2 days prior to admission with severe nausea, and vomiting. In the ED, found to be febrile with WBC 20.5 on admission. CXR with no acute infiltrates.    On exam, NAD; noted IV sites along wrists which do not appear infected. Lungs clear. No murmur appreciated. No evidence of embolic phenomenon.                          12.4  15.46 )-----------( 235      ( 05 Aug 2020 05:21 )             39.0      137  |  101  |  10  ----------------------------<  80  3.9   |  24  |  0.6<L>    Ca    9.0      05 Aug 2020 05:21  Mg     1.9     08-05    TPro  7.2  /  Alb  4.2  /  TBili  0.8  /  DBili  x   /  AST  35  /  ALT  41  /  AlkPhos  115  08-04    HIV negative  Hep C Negative    ASSESSMENT  25y F with hx of IV drug use who presents with fevers, nausea, and vomiting    IMPRESSION  #Sepsis on admission (fever, tachycardia, WBC>12) rule out endocarditis  #Substance abuse, hx of IV drug use  #Abx allergy: pentazocine (Other)  phenobarbital (Rash)    Creatinine, Serum: 0.6 mg/dL (08.05.20 @ 05:21)  Weight (kg): 65.1 (05 Aug 2020 01:00)    RECOMMENDATIONS  - please repeat blood cultures today and tomorrow to evaluate for any persistent bacteremia  - stop cefepime, switch to ceftriaxone 2g daily  - continue vancomycin 1g q 12 hours, please obtain trough prior to 4th dose, goal 15-20  - follow-up blood cultures      Please call if with any questions.  Spectra [phone]

## 2020-08-05 NOTE — PROGRESS NOTE ADULT - SUBJECTIVE AND OBJECTIVE BOX
Patient is a 25y old  Female who presents with a chief complaint of suspected infective endocarditis (04 Aug 2020 21:01)      HPI:  [25y woman]    CC: nausea and vomiting    PMH: IV drug use, polysubstance use disorder (heroin, marijuana, methadone, alprazolam), bipolar disorder with previous suicide attempt, active smoker    PSH: no significant past surgical history    History of present illness goes back to 2 days ago when the patient woke up with severe nausea and non-bloody, non-bilious vomiting. She claims to have thrown up ~20 times over the past 2 days. The emesis was yellow and orange in color. She tried "taking sips of water but it came right back up." She does not recall eating anything differently over the past week and no one else around her has been sick. She initially thought it was a bad batch of heroin, so she injected herself with heroin and took methadone 15mg PO at 9am on the day of presentation. Her symptoms did not improve, so she came to the ED.    In the ED, vital signs were Tmax 100.7F, , /60, RR 18, SpO2 96% on room air. Patient received vancomycin 1g IV, 2L bolus of LR, Tylenol 975mg PO, and Zofran 4mg IV. A bedside echo was performed for suspected infective endocarditis, but no vegetations were noted. ECG showed T wave inversions in leads V1-V4. (04 Aug 2020 21:01)      PAST MEDICAL & SURGICAL HISTORY:  Bipolar disorder  Suicide attempt  Heroin overdose  Nicotine dependence  Cannabis dependence  Methadone dependence  Opiate abuse, continuous  Eating disorder: Previous history of bulimia nervosa  IV drug abuse  No significant past surgical history      Home Medications:      .  Tobacco use:   EtOH use:  Illicit drug use:    Living situation:    fish (Hives)  pentazocine (Other)  phenobarbital (Rash)      FAMILY HISTORY:  Family history of drug abuse: Mother uses heroin      ALPRAZolam 0.5 milliGRAM(s) Oral every 8 hours PRN  cefepime   IVPB 2000 milliGRAM(s) IV Intermittent every 8 hours  cefepime   IVPB      chlorhexidine 4% Liquid 1 Application(s) Topical <User Schedule>  lactated ringers. 1000 milliLiter(s) IV Continuous <Continuous>  ondansetron    Tablet 4 milliGRAM(s) Oral every 8 hours PRN  pantoprazole    Tablet 40 milliGRAM(s) Oral before breakfast  vancomycin  IVPB 1000 milliGRAM(s) IV Intermittent every 12 hours      Vital Signs Last 24 Hrs  T(C): 36.7 (05 Aug 2020 04:51), Max: 38.2 (04 Aug 2020 15:28)  T(F): 98 (05 Aug 2020 04:51), Max: 100.7 (04 Aug 2020 15:28)  HR: 73 (05 Aug 2020 04:51) (71 - 115)  BP: 107/64 (05 Aug 2020 04:51) (86/53 - 107/64)  BP(mean): 76 (04 Aug 2020 17:00) (65 - 79)  RR: 18 (05 Aug 2020 04:51) (16 - 18)  SpO2: 98% (04 Aug 2020 22:11) (96% - 98%)    I&O's Summary                            12.4   15.46 )-----------( 235      ( 05 Aug 2020 05:21 )             39.0       08-05    137  |  101  |  10  ----------------------------<  80  3.9   |  24  |  0.6<L>    Ca    9.0      05 Aug 2020 05:21  Mg     1.9     08-05    TPro  7.2  /  Alb  4.2  /  TBili  0.8  /  DBili  x   /  AST  35  /  ALT  41  /  AlkPhos  115  08-04      CARDIAC MARKERS ( 05 Aug 2020 05:21 )  x     / <0.01 ng/mL / x     / x     / x      CARDIAC MARKERS ( 04 Aug 2020 15:10 )  x     / <0.01 ng/mL / x     / x     / x                PHYSICAL EXAM:  GENERAL: NAD, lying in bed comfortably  HEAD:  Atraumatic, Normocephalic  EYES: EOMI, PERRLA, conjunctiva and sclera clear  ENT: Moist mucous membranes  NECK: Supple, No JVD  CHEST/LUNG: Clear to auscultation bilaterally; No rales, rhonchi, wheezing, or rubs. Unlabored respirations  HEART: Regular rate and rhythm; No murmurs, rubs, or gallops  ABDOMEN: Bowel sounds present; Soft, Nontender, Nondistended. No hepatomegally  EXTREMITIES:  2+ Peripheral Pulses, brisk capillary refill. No clubbing, cyanosis, or edema  NERVOUS SYSTEM:  Alert & Oriented X3, speech clear. No deficits   MSK: FROM all 4 extremities, full and equal strength  SKIN: No rashes or lesions

## 2020-08-05 NOTE — BEHAVIORAL HEALTH ASSESSMENT NOTE - OTHER PAST PSYCHIATRIC HISTORY (INCLUDE DETAILS REGARDING ONSET, COURSE OF ILLNESS, INPATIENT/OUTPATIENT TREATMENT)
1 prior suicide attempt at age 15, circumstances need to be further explored. Past psychotherapy via substance programs, no past psychiatric treatment.

## 2020-08-05 NOTE — CONSULT NOTE ADULT - SUBJECTIVE AND OBJECTIVE BOX
GARRISON JOSÉ MANUEL  25y, Female  Allergy: fish (Hives)  pentazocine (Other)  phenobarbital (Rash)      CHIEF COMPLAINT: suspected infective endocarditis (05 Aug 2020 10:04)      HPI:  [25y woman]    CC: nausea and vomiting    PMH: IV drug use, polysubstance use disorder (heroin, marijuana, methadone, alprazolam), bipolar disorder with previous suicide attempt, active smoker    PSH: no significant past surgical history    History of present illness goes back to 2 days ago when the patient woke up with severe nausea and non-bloody, non-bilious vomiting. She claims to have thrown up ~20 times over the past 2 days. The emesis was yellow and orange in color. She tried "taking sips of water but it came right back up." She does not recall eating anything differently over the past week and no one else around her has been sick. She initially thought it was a bad batch of heroin, so she injected herself with heroin and took methadone 15mg PO at 9am on the day of presentation. Her symptoms did not improve, so she came to the ED.    In the ED, vital signs were Tmax 100.7F, , /60, RR 18, SpO2 96% on room air. Patient received vancomycin 1g IV, 2L bolus of LR, Tylenol 975mg PO, and Zofran 4mg IV. A bedside echo was performed for suspected infective endocarditis, but no vegetations were noted. ECG showed T wave inversions in leads V1-V4. (04 Aug 2020 21:01.    Blood cultures were drawn and patient was started on empiric vanc and cefepime    FAMILY HISTORY:  Family history of drug abuse: Mother uses heroin    PAST MEDICAL & SURGICAL HISTORY:  Bipolar disorder  Suicide attempt  Heroin overdose  Nicotine dependence  Cannabis dependence  Methadone dependence  Opiate abuse, continuous  Eating disorder: Previous history of bulimia nervosa  IV drug abuse  No significant past surgical history      SOCIAL HISTORY    Substance Use (  ) never used  ( x ) IVDU (  ) Other:  Tobacco Usage:  (   ) never smoked   (   ) former smoker   ( x  ) current smoker   Alcohol Usage: (   ) social  ( x  ) daily use (   ) denies      ROS  General: Denies rigors, nightsweats  HEENT: Denies headache, rhinorrhea, sore throat, eye pain  CV: Denies CP, palpitations  PULM: Denies wheezing, hemoptysis  GI: Denies hematemesis, hematochezia, melena  : Denies discharge, hematuria  MSK: Denies arthralgias, myalgias  SKIN: Denies rash, lesions  NEURO: Denies paresthesias, weakness  PSYCH: Denies depression, anxiety    VITALS:  T(F): 98, Max: 100.7 (20 @ 15:28)  HR: 73  BP: 107/64  RR: 18Vital Signs Last 24 Hrs  T(C): 36.7 (05 Aug 2020 04:51), Max: 38.2 (04 Aug 2020 15:28)  T(F): 98 (05 Aug 2020 04:51), Max: 100.7 (04 Aug 2020 15:28)  HR: 73 (05 Aug 2020 04:51) (71 - 115)  BP: 107/64 (05 Aug 2020 04:51) (86/53 - 107/64)  BP(mean): 76 (04 Aug 2020 17:00) (65 - 79)  RR: 18 (05 Aug 2020 04:51) (16 - 18)  SpO2: 98% (04 Aug 2020 22:11) (96% - 98%)    PHYSICAL EXAM:  Gen: NAD, resting in bed  HEENT: Normocephalic, atraumatic  Neck: supple, no lymphadenopathy  CV: Regular rate & regular rhythm  Lungs: decreased BS at bases  Abdomen: Soft, BS present  Ext: Warm, well perfused  Neuro: non focal, awake  Skin: no rash, no erythema    TESTS & MEASUREMENTS:                        12.4   15.46 )-----------( 235      ( 05 Aug 2020 05:21 )             39.0     -    137  |  101  |  10  ----------------------------<  80  3.9   |  24  |  0.6<L>    Ca    9.0      05 Aug 2020 05:21  Mg     1.9         TPro  7.2  /  Alb  4.2  /  TBili  0.8  /  DBili  x   /  AST  35  /  ALT  41  /  AlkPhos  115  08-04    eGFR if Non African American: 128 mL/min/1.73M2 (20 @ 05:21)  eGFR if : 149 mL/min/1.73M2 (20 @ 05:21)  eGFR if Non African American: 104 mL/min/1.73M2 (20 @ 15:10)  eGFR if African American: 120 mL/min/1.73M2 (20 @ 15:10)    LIVER FUNCTIONS - ( 04 Aug 2020 15:10 )  Alb: 4.2 g/dL / Pro: 7.2 g/dL / ALK PHOS: 115 U/L / ALT: 41 U/L / AST: 35 U/L / GGT: x           Urinalysis Basic - ( 04 Aug 2020 18:13 )    Color: Colorless / Appearance: Clear / S.004 / pH: x  Gluc: x / Ketone: Negative  / Bili: Negative / Urobili: <2 mg/dL   Blood: x / Protein: Negative / Nitrite: Negative   Leuk Esterase: Negative / RBC: x / WBC x   Sq Epi: x / Non Sq Epi: x / Bacteria: x          Blood Gas Venous - Lactate: 1.3 mmoL/L (20 @ 15:18)      INFECTIOUS DISEASES TESTING  HIV-1/2 Combo Result: Nonreact (20 @ 16:20)      RADIOLOGY & ADDITIONAL TESTS:  I have personally reviewed the last Chest xray  CXR  Xray Chest 1 View AP/PA:   EXAM:  XR CHEST FRONTAL 1V            PROCEDURE DATE:  2020            INTERPRETATION:  Clinical History / Reason for exam: Sepsis.    Comparison : Chest radiograph dated 2016.    Technique/Positioning: Portable frontal.    Findings:    Support devices: Overlying EKG leads.    Cardiac/mediastinum/hilum: Unremarkable.    Lung parenchyma/Pleura: Within normal limits.    Skeleton/soft tissues: No acute osseous abnormality.    Impression:    No radiographic evidence of acute cardiopulmonary disease.                    SEE PATIÑO M.D., ATTENDING RADIOLOGIST  This document has been electronically signed. Aug  5 2020  6:50AM             (20 @ 16:55)      CT      CARDIOLOGY TESTING  12 Lead ECG:   Ventricular Rate 69 BPM    Atrial Rate 69 BPM    P-R Interval 126 ms    QRS Duration 90 ms    Q-T Interval 420 ms    QTC Calculation(Bezet) 450 ms    P Axis 39 degrees    R Axis 35 degrees    T Axis 20 degrees    Diagnosis Line Normal sinus rhythm with sinus arrhythmia  T wave abnormality, consider anterior ischemia  Abnormal ECG    Confirmed by Andre Aguirre ([id number]) on 2020 8:43:45 AM (20 @ 07:39)  12 Lead ECG:   Ventricular Rate 97 BPM    Atrial Rate 97 BPM    P-R Interval 124 ms    QRS Duration 84 ms    Q-T Interval 340 ms    QTC Calculation(Bezet) 431 ms    P Axis 42 degrees    R Axis 37 degrees    T Axis 18 degrees    Diagnosis Line Normal sinus rhythm  Possible Left atrial enlargement  T wave abnormality, consider anterior ischemia  Abnormal ECG    Confirmed by Andre Aguirre ([id number]) on 2020 7:35:50 PM (20 @ 14:42)      MEDICATIONS  cefepime   IVPB 2000  cefepime   IVPB   chlorhexidine 4% Liquid 1  lactated ringers. 1000  pantoprazole    Tablet 40  vancomycin  IVPB 1000      ANTIBIOTICS:  cefepime   IVPB 2000 milliGRAM(s) IV Intermittent every 8 hours  cefepime   IVPB      vancomycin  IVPB 1000 milliGRAM(s) IV Intermittent every 12 hours

## 2020-08-05 NOTE — BEHAVIORAL HEALTH ASSESSMENT NOTE - NSBHCHARTREVIEWVS_PSY_A_CORE FT
Vital Signs Last 24 Hrs  T(C): 37.3 (05 Aug 2020 20:25), Max: 37.3 (05 Aug 2020 20:25)  T(F): 99.2 (05 Aug 2020 20:25), Max: 99.2 (05 Aug 2020 20:25)  HR: 62 (05 Aug 2020 20:25) (62 - 93)  BP: 106/65 (05 Aug 2020 20:25) (101/63 - 107/64)  RR: 18 (05 Aug 2020 20:25) (18 - 20)  SpO2: 98% (04 Aug 2020 22:11) (98% - 98%)

## 2020-08-05 NOTE — BEHAVIORAL HEALTH ASSESSMENT NOTE - DETAILS
History of suicide attempt at 15 years old, patient states "I can't remember" when asked details mother had opiate use disorder

## 2020-08-05 NOTE — BEHAVIORAL HEALTH ASSESSMENT NOTE - NSBHCONSULTFOLLOWAFTERCARE_PSY_A_CORE FT
Inpatient rehab for substance use disorder per patient preference Inpatient rehab for substance use disorder per patient preference.

## 2020-08-05 NOTE — CONSULT NOTE ADULT - ASSESSMENT
ASSESSMENT  25y F admitted with SEPSIS    Bipolar disorder  Suicide attempt  Heroin overdose  Nicotine dependence  Cannabis dependence  Methadone dependence  Opiate abuse, continuous  Eating disorder  IV drug abuse      PROBLEMS  Pt with nausea and vomiting on admission and and ED temp of 100.7 found to have WBC >20 with suspected endocarditis  Current IV drug user  Intermittent low grade fever  Mother previously diagnosed w IE  Denies sharing needles with mother  2-D ECHO 7/04 negative for valvular vegatations/ lesions    RECOMMENDATIONS  - f/u pending cultures  - c/w IV vanc 1g q12  - c/w Cefepime IV 1000mL drip  - will follow ASSESSMENT  25y F admitted with SEPSIS    Bipolar disorder  Suicide attempt  Heroin overdose  Nicotine dependence  Cannabis dependence  Methadone dependence  Opiate abuse, continuous  Eating disorder  IV drug abuse      PROBLEMS  Pt with nausea and vomiting on admission and and ED temp of 100.7 found to have WBC >20 with suspected endocarditis  Current IV drug user  Intermittent low grade fever  Mother previously diagnosed w IE  Denies sharing needles with mother  2-D ECHO 7/04 negative for valvular vegatations/ lesions  EKG negative for signs of AV block    RECOMMENDATIONS  - f/u pending blood cultures 4/08  - obtain addition blood cultures today  - c/w IV vanc 1g q12  - d/c Cefepime IV 1000mL drip  - start Ceftriaxone 2000mg q24  - will follow
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
## 2020-08-05 NOTE — PROGRESS NOTE ADULT - ASSESSMENT
Assessment:  Patient is a 24yo female with PMH of IV drug abuse, polysubstance use disorder (heroin, marijuana, methadone, alprazolam), bipolar disorder with previous suicide attempt, and active smoker who presented to the ED complaining of nausea and vomiting of 2 days duration. Patient was admitted for suspected infective endocarditis.    #Sepsis on admission secondary to suspected infective endocarditis  -ED vitals WBC 20, Tmax 100.7F, , RR 18, lactate 1.3, pt c/o nausea and vomiting  -h/o IVDA, pt admits to repeat using needles occasionally   -bedside echo in ED was negative for vegetations  -f/u 2D echo results  -currently on vancomycin IV 1g q12 and cefepime IV 2g q8  -on LR at 100ml/hr  -f/u blood and urine cultures    #T wave inversion on EKG  -T wave inversions on EKG in ED in Leads V1-V4, repeat echo this AM remains unchanged  -pt denies any symptoms  -Trop neg x2, BNP WNL  -f/u 2D echo    #polysubstance use disorder   -pt used heroin and methadone at 9am on 08/04 day of presentation  -monitor pt for symptoms of withdrawal, if actively withdrawing can give suboxone   -will order fentanyl tox screen  -pending CATCH evaluation  -pt takes xanax 3-5 times a day, started on xanax 0.5 mg 3 times a day  -f/u acute hepatitis panel, HIV screening    #h/o Bipolar disorder/depression  -pt admits to having depression  -psych consult placed, will f/u    #smoking  -pt does not wish to quit at the moment    DVT prophylaxis: stopped lovenox, pt does not need it  GI prophylaxis: Pantoprazole 40mg  Code status: full code    Pending: blood culture, urine culture, urine tox screen, 2d echo, ID consult, psych and CATCH evaluation, monitor for withdrawal Assessment:  Patient is a 24yo female with PMH of IV drug abuse, polysubstance use disorder (heroin, marijuana, methadone, alprazolam), bipolar disorder with previous suicide attempt, and active smoker who presented to the ED complaining of nausea and vomiting of 2 days duration. Patient was admitted for suspected infective endocarditis.    #Sepsis on admission secondary to suspected infective endocarditis  -ED vitals WBC 20, Tmax 100.7F, , RR 18, lactate 1.3, pt c/o nausea and vomiting  -h/o IVDA, pt admits to repeat using needles occasionally   -bedside echo in ED was negative for vegetations  -f/u 2D echo results  -currently on vancomycin IV 1g q12 and cefepime IV 2g q8, continue Abx  -on LR at 100ml/hr  -f/u blood and urine cultures    #T wave inversion on EKG  -T wave inversions on EKG in ED in Leads V1-V4, repeat echo this AM remains unchanged  -pt denies any symptoms  -Trop neg x2, BNP WNL  -f/u 2D echo    #polysubstance use disorder   -pt used heroin and methadone at 9am on 08/04 day of presentation  -monitor pt for symptoms of withdrawal, if actively withdrawing can give suboxone   -will order fentanyl tox screen  -pending CATCH evaluation  -pt takes xanax 3-5 times a day, started on xanax 0.5 mg 3 times a day  -f/u acute hepatitis panel, HIV screening    #h/o Bipolar disorder/depression  -pt admits to having depression  -psych consult placed, will f/u    #smoking  -pt does not wish to quit at the moment    DVT prophylaxis: stopped lovenox, pt does not need it  GI prophylaxis: Pantoprazole 40mg  Code status: full code    Pending: blood culture, urine culture, urine tox screen, 2d echo, ID consult, psych and CATCH evaluation, monitor for withdrawal Assessment:  Patient is a 26yo female with PMH of IV drug abuse, polysubstance use disorder (heroin, marijuana, methadone, alprazolam), bipolar disorder with previous suicide attempt, and active smoker who presented to the ED complaining of nausea and vomiting of 2 days duration. Patient was admitted for suspected infective endocarditis.    #Sepsis on admission secondary to suspected infective endocarditis  -ED vitals WBC 20, Tmax 100.7F, , RR 18, lactate 1.3, pt c/o nausea and vomiting  -h/o IVDA, pt admits to repeat using needles occasionally   -bedside echo in ED was negative for vegetations  -f/u 2D echo results  -currently on vancomycin IV 1g q12 and cefepime IV 2g q8, continue Abx  -on LR at 100ml/hr  -f/u blood and urine cultures    #T wave inversion on EKG  -T wave inversions on EKG in ED in Leads V1-V4, repeat echo this AM remains unchanged  -pt denies any symptoms  -Trop neg x2, BNP WNL  -f/u 2D echo    #polysubstance use disorder   -pt used heroin and methadone at 9am on 08/04 day of presentation  -monitor pt for symptoms of withdrawal  -started on methadone 30mg daily, COWS score 10, in mild withdrawal  -will order fentanyl tox screen  -pending CATCH evaluation  -pt takes xanax 3-5 times a day, started on xanax 0.5 mg 3 times a day  -f/u acute hepatitis panel, HIV screening    #h/o Bipolar disorder/depression  -pt admits to having depression  -psych consult placed, will f/u    #smoking  -pt does not wish to quit at the moment    DVT prophylaxis: stopped lovenox, pt does not need it  GI prophylaxis: Pantoprazole 40mg  Code status: full code    Pending: blood culture, urine culture, urine tox screen, 2d echo, ID consult, psych and CATCH evaluation, monitor for withdrawal

## 2020-08-05 NOTE — BEHAVIORAL HEALTH ASSESSMENT NOTE - DESCRIPTION (FIRST USE, LAST USE, QUANTITY, FREQUENCY, DURATION)
History fo severe use disorder; over 5 rehabs/detoxes in past, use of 2 bundles daily for last year. History of treatment with methadone in the past (max dose 160mg, though states her goal was to "get high on methadone" at the time). 12 mg xanax (6mg BID) for last 3 months

## 2020-08-05 NOTE — BEHAVIORAL HEALTH ASSESSMENT NOTE - RISK ASSESSMENT
Low Acute Suicide Risk Risk factors include substance use disorders, history of suicide attempts, anxiety, sleep disturbance, acute medical illness; protective factors include no acute suicidality, social support from boyfriend and father, feelings of responsibility towards 5 year old son, willing to proceed with treatment. Based on above, chronically elevated risk but no current indication for nonvoluntary IPP admission.

## 2020-08-06 LAB
ALBUMIN SERPL ELPH-MCNC: 3.8 G/DL — SIGNIFICANT CHANGE UP (ref 3.5–5.2)
ALP SERPL-CCNC: 90 U/L — SIGNIFICANT CHANGE UP (ref 30–115)
ALT FLD-CCNC: 26 U/L — SIGNIFICANT CHANGE UP (ref 0–41)
ANION GAP SERPL CALC-SCNC: 14 MMOL/L — SIGNIFICANT CHANGE UP (ref 7–14)
AST SERPL-CCNC: 19 U/L — SIGNIFICANT CHANGE UP (ref 0–41)
BASOPHILS # BLD AUTO: 0.05 K/UL — SIGNIFICANT CHANGE UP (ref 0–0.2)
BASOPHILS NFR BLD AUTO: 0.6 % — SIGNIFICANT CHANGE UP (ref 0–1)
BILIRUB SERPL-MCNC: 0.4 MG/DL — SIGNIFICANT CHANGE UP (ref 0.2–1.2)
BUN SERPL-MCNC: 11 MG/DL — SIGNIFICANT CHANGE UP (ref 10–20)
CALCIUM SERPL-MCNC: 9.2 MG/DL — SIGNIFICANT CHANGE UP (ref 8.5–10.1)
CHLORIDE SERPL-SCNC: 104 MMOL/L — SIGNIFICANT CHANGE UP (ref 98–110)
CO2 SERPL-SCNC: 21 MMOL/L — SIGNIFICANT CHANGE UP (ref 17–32)
CREAT SERPL-MCNC: 0.7 MG/DL — SIGNIFICANT CHANGE UP (ref 0.7–1.5)
CULTURE RESULTS: SIGNIFICANT CHANGE UP
EOSINOPHIL # BLD AUTO: 0.07 K/UL — SIGNIFICANT CHANGE UP (ref 0–0.7)
EOSINOPHIL NFR BLD AUTO: 0.8 % — SIGNIFICANT CHANGE UP (ref 0–8)
GLUCOSE SERPL-MCNC: 95 MG/DL — SIGNIFICANT CHANGE UP (ref 70–99)
HCT VFR BLD CALC: 37.3 % — SIGNIFICANT CHANGE UP (ref 37–47)
HGB BLD-MCNC: 12 G/DL — SIGNIFICANT CHANGE UP (ref 12–16)
IMM GRANULOCYTES NFR BLD AUTO: 0.2 % — SIGNIFICANT CHANGE UP (ref 0.1–0.3)
LYMPHOCYTES # BLD AUTO: 1.78 K/UL — SIGNIFICANT CHANGE UP (ref 1.2–3.4)
LYMPHOCYTES # BLD AUTO: 21.2 % — SIGNIFICANT CHANGE UP (ref 20.5–51.1)
MAGNESIUM SERPL-MCNC: 2.1 MG/DL — SIGNIFICANT CHANGE UP (ref 1.8–2.4)
MCHC RBC-ENTMCNC: 28 PG — SIGNIFICANT CHANGE UP (ref 27–31)
MCHC RBC-ENTMCNC: 32.2 G/DL — SIGNIFICANT CHANGE UP (ref 32–37)
MCV RBC AUTO: 87.1 FL — SIGNIFICANT CHANGE UP (ref 81–99)
MONOCYTES # BLD AUTO: 0.7 K/UL — HIGH (ref 0.1–0.6)
MONOCYTES NFR BLD AUTO: 8.3 % — SIGNIFICANT CHANGE UP (ref 1.7–9.3)
NEUTROPHILS # BLD AUTO: 5.77 K/UL — SIGNIFICANT CHANGE UP (ref 1.4–6.5)
NEUTROPHILS NFR BLD AUTO: 68.9 % — SIGNIFICANT CHANGE UP (ref 42.2–75.2)
NRBC # BLD: 0 /100 WBCS — SIGNIFICANT CHANGE UP (ref 0–0)
PLATELET # BLD AUTO: 221 K/UL — SIGNIFICANT CHANGE UP (ref 130–400)
POTASSIUM SERPL-MCNC: 4.3 MMOL/L — SIGNIFICANT CHANGE UP (ref 3.5–5)
POTASSIUM SERPL-SCNC: 4.3 MMOL/L — SIGNIFICANT CHANGE UP (ref 3.5–5)
PROT SERPL-MCNC: 6.8 G/DL — SIGNIFICANT CHANGE UP (ref 6–8)
RBC # BLD: 4.28 M/UL — SIGNIFICANT CHANGE UP (ref 4.2–5.4)
RBC # FLD: 12.6 % — SIGNIFICANT CHANGE UP (ref 11.5–14.5)
SODIUM SERPL-SCNC: 139 MMOL/L — SIGNIFICANT CHANGE UP (ref 135–146)
SPECIMEN SOURCE: SIGNIFICANT CHANGE UP
WBC # BLD: 8.39 K/UL — SIGNIFICANT CHANGE UP (ref 4.8–10.8)
WBC # FLD AUTO: 8.39 K/UL — SIGNIFICANT CHANGE UP (ref 4.8–10.8)

## 2020-08-06 PROCEDURE — 93306 TTE W/DOPPLER COMPLETE: CPT | Mod: 26

## 2020-08-06 PROCEDURE — 99252 IP/OBS CONSLTJ NEW/EST SF 35: CPT

## 2020-08-06 PROCEDURE — 99233 SBSQ HOSP IP/OBS HIGH 50: CPT

## 2020-08-06 PROCEDURE — 93010 ELECTROCARDIOGRAM REPORT: CPT

## 2020-08-06 RX ORDER — METHADONE HYDROCHLORIDE 40 MG/1
20 TABLET ORAL ONCE
Refills: 0 | Status: DISCONTINUED | OUTPATIENT
Start: 2020-08-07 | End: 2020-08-07

## 2020-08-06 RX ADMIN — METHADONE HYDROCHLORIDE 30 MILLIGRAM(S): 40 TABLET ORAL at 11:13

## 2020-08-06 RX ADMIN — CEFEPIME 100 MILLIGRAM(S): 1 INJECTION, POWDER, FOR SOLUTION INTRAMUSCULAR; INTRAVENOUS at 13:54

## 2020-08-06 RX ADMIN — PANTOPRAZOLE SODIUM 40 MILLIGRAM(S): 20 TABLET, DELAYED RELEASE ORAL at 05:37

## 2020-08-06 RX ADMIN — CEFEPIME 100 MILLIGRAM(S): 1 INJECTION, POWDER, FOR SOLUTION INTRAMUSCULAR; INTRAVENOUS at 05:37

## 2020-08-06 RX ADMIN — Medication 250 MILLIGRAM(S): at 05:37

## 2020-08-06 NOTE — PROGRESS NOTE ADULT - ASSESSMENT
ASSESSMENT  25y F with hx of IV drug use who presents with fevers, nausea, and vomiting    IMPRESSION  #Sepsis on admission (fever, tachycardia, WBC>12) rule out endocarditis  - Blood Cx 8/4 (taken prior to antibiotics) NGTD  - TTE read pending  #Substance abuse, hx of IV drug use  #Abx allergy: pentazocine (Other)  phenobarbital (Rash)    Creatinine, Serum: 0.6 mg/dL (08.05.20 @ 05:21)  Weight (kg): 65.1 (05 Aug 2020 01:00)    RECOMMENDATIONS  - please repeat blood cultures today and tomorrow to evaluate for any persistent bacteremia  - follow-up blood cultures      This is a preliminary incomplete pended note, all final recommendations to follow after interview and examination of the patient.    Please call if with any questions.  Spectra [phone]

## 2020-08-06 NOTE — PROGRESS NOTE ADULT - SUBJECTIVE AND OBJECTIVE BOX
JOSÉ MANUEL JI  25y, Female  Allergy: fish (Hives)  pentazocine (Other)  phenobarbital (Rash)  Hospital Day: 2d      Patient was seen and examined at bedside. denies fevers, chills, CP, palpitations, cough, sob, abdominal pain, n/v/c/d. she states she wants to eat something more substantial         VITALS:  T(F): 97.6 (20 @ 04:31), Max: 99.2 (20 @ 20:25)  HR: 51 (20 @ 04:31)  BP: 102/57 (20 @ 04:31) (102/57 - 106/65)  RR: 18 (20 @ 04:31)      PHYSICAL EXAM:  GENERAL: well developed well nourished in no acute distress  NECK: No evidence of swelling no palpable lymphadenopathy  CHEST/LUNG: clear to ausculation bilaterally no wheezes, rales, or rhonchi   HEART/VASC: RRR, No murmurs, rubs, or gallops appreciated, 2+ equal bilateral radial pulse  ABDOMEN: Soft, non tender non distended +bowel sounds in all quadrants, no palpable organomegaly   EXTREMITIES:  No clubbing and range of motion intact       TESTS & MEASUREMENTS:  Weight (Kg): 65.1 (20 @ 01:00)  BMI (kg/m2): 25.4 ()                          12.0   8.39  )-----------( 221      ( 06 Aug 2020 06:22 )             37.3       -    139  |  104  |  11  ----------------------------<  95  4.3   |  21  |  0.7    Ca    9.2      06 Aug 2020 06:22  Mg     2.1     -    TPro  6.8  /  Alb  3.8  /  TBili  0.4  /  DBili  x   /  AST  19  /  ALT  26  /  AlkPhos  90  -    LIVER FUNCTIONS - ( 06 Aug 2020 06:22 )  Alb: 3.8 g/dL / Pro: 6.8 g/dL / ALK PHOS: 90 U/L / ALT: 26 U/L / AST: 19 U/L / GGT: x           CARDIAC MARKERS ( 05 Aug 2020 05:21 )  x     / <0.01 ng/mL / x     / x     / x      CARDIAC MARKERS ( 04 Aug 2020 15:10 )  x     / <0.01 ng/mL / x     / x     / x            Culture - Blood (collected 20 @ 05:21)  Source: .Blood None  Preliminary Report (20 @ 13:01):    No growth to date.    Culture - Urine (collected 20 @ 18:13)  Source: .Urine Clean Catch (Midstream)  Final Report (20 @ 00:29):    <10,000 CFU/mL Normal Urogenital Marlena    Culture - Blood (collected 20 @ 15:10)  Source: .Blood Blood-Peripheral  Preliminary Report (20 @ 01:02):    No growth to date.    Culture - Blood (collected 20 @ 15:10)  Source: .Blood Blood-Peripheral  Preliminary Report (20 @ 01:02):    No growth to date.      Urinalysis Basic - ( 04 Aug 2020 18:13 )    Color: Colorless / Appearance: Clear / S.004 / pH: x  Gluc: x / Ketone: Negative  / Bili: Negative / Urobili: <2 mg/dL   Blood: x / Protein: Negative / Nitrite: Negative   Leuk Esterase: Negative / RBC: x / WBC x   Sq Epi: x / Non Sq Epi: x / Bacteria: x        MEDICATIONS:  MEDICATIONS  (STANDING):  cefepime   IVPB 2000 milliGRAM(s) IV Intermittent every 8 hours  cefepime   IVPB      chlorhexidine 4% Liquid 1 Application(s) Topical <User Schedule>  lactated ringers. 1000 milliLiter(s) (100 mL/Hr) IV Continuous <Continuous>  methadone    Tablet 30 milliGRAM(s) Oral daily  pantoprazole    Tablet 40 milliGRAM(s) Oral before breakfast  vancomycin  IVPB 1000 milliGRAM(s) IV Intermittent every 12 hours    MEDICATIONS  (PRN):  ondansetron    Tablet 4 milliGRAM(s) Oral every 8 hours PRN Nausea and/or Vomiting

## 2020-08-06 NOTE — PROGRESS NOTE ADULT - ASSESSMENT
ASSESSMENT  25y F with hx of IV drug use who presents with fevers, nausea, and vomiting    IMPRESSION  #Sepsis on admission (fever, tachycardia, WBC>12) rule out endocarditis  - Blood Cx 8/4 (taken prior to antibiotics) NGTD  - TTE 8/5:  1. LV Ejection Fraction by Sultana's Method with a biplane EF of 55 %.   2. Mild mitral valve regurgitation.   3. Mild thickening of the anterior and posterior mitral valve leaflets.    #substance abuse, hx of IV drug use  #Abx allergy: pentazocine (Other)  phenobarbital (Rash)    Creatinine, Serum: 0.6 mg/dL (08.05.20 @ 05:21)  Weight (kg): 65.1 (05 Aug 2020 01:00)    RECOMMENDATIONS  - TTE negative for vegetations  - will review TTE with cardiology  - can stop antibiotics for now and monitor for fevers  - if remains afebrile, can plan for discharge tomorrow afternoon, with repeat blood cultures done in 1 week as outpatient   - follow-up blood cultures until final       Please call if with any questions.  Spectra [phone]

## 2020-08-06 NOTE — PROGRESS NOTE ADULT - ASSESSMENT
24yo female with PMH of IV drug use, polysubstance use disorder (heroin, marijuana, methadone, alprazolam), bipolar disorder with previous suicide attempt, and active smoker who presented to the ED complaining of nausea and vomiting of 2 days duration.     #Sepsis secondary to suspected infective endocarditis due to IV drug use  Sepsis present on admission (WBC 20, Tmax 100.7F, , RR 18, lactate 1.3)  Patient is active IV heroin user and admits to occasionally repeat using needles  WBC trending down and pt remains afebrile   - will observe off antibiotics   - encourage   - ID consult f/u   - 2D echocardiogram pending   - f/u blood and urine cultures. will repeat bcx today   - monitor fever curve     #T wave inversions on ECG  ECG in ED showed T wave inversions in leads V-V4, which are not present when compared to previous ECG in 2018  Patient denies any chest pain or dyspnea on exertion  - Troponin negative x2  - Follow 2D echocardiogram    #Polysubstance use disorder  Most recently used heroin and methadone at 9am on day of presentation  -cont to monitor patient for signs of withdrawal (COWS)  -seen by CATCH team attending: will start methadone 30mg QD  -start xanax 0.5mg TID PRN given pt takes high doses are home       #History of bipolar disorder with previous suicide attempt  - Denies suicidal ideation at this time  - Not currently on medication outpatient  - f/u psychiatry evaluation for medication recommendation likely needs to establish care as an outpt    #Nicotine dependence  - nicotine patch while admitted     Progress Note Handoff  Pending:  bcx, echo, symptomatic improvement  Patient/Family discussion: spoke with pt at bedside she is agreeable to POC and remaining in the hospital for further mgmt  Disposition: from home 24yo female with PMH of IV drug use, polysubstance use disorder (heroin, marijuana, methadone, alprazolam), bipolar disorder with previous suicide attempt, and active smoker who presented to the ED complaining of nausea and vomiting of 2 days duration.     #Sepsis secondary to suspected infective endocarditis due to IV drug use  Sepsis present on admission (WBC 20, Tmax 100.7F, , RR 18, lactate 1.3)  Patient is active IV heroin user and admits to occasionally repeat using needles  WBC trending down and pt remains afebrile   - will observe off antibiotics   - encourage   - ID consult f/u   - 2D echocardiogram: EF 55% mild mitral valve regurg, thickening of mitral valve leaflet but no clear vegetation noted   - f/u blood and urine cultures final reports   - monitor fever curve     #T wave inversions on ECG  ECG in ED showed T wave inversions in leads V-V4, which are not present when compared to previous ECG in 2018  Patient denies any chest pain or dyspnea on exertion  - Troponin negative x2  - Follow 2D echocardiogram    #Polysubstance use disorder  Most recently used heroin and methadone at 9am on day of presentation  -cont to monitor patient for signs of withdrawal (COWS)  -seen by CATCH team attending: will cont methadone 30mg QD and establish as an outpt in the methadone clinc  -cont xanax 0.5mg TID PRN given pt takes high doses are home       #History of bipolar disorder with previous suicide attempt  - Denies suicidal ideation at this time  - Not currently on medication outpatient  - f/u psychiatry evaluation for medication recommendation likely needs to establish care as an outpt    #Nicotine dependence  - nicotine patch while admitted     Progress Note Handoff  Pending: final bcx, symptomatic improvement  Patient/Family discussion: spoke with pt at bedside she is agreeable to POC and remaining in the hospital for further mgmt  Disposition: from home likely d/c in 24-48hrs 26yo female with PMH of IV drug use, polysubstance use disorder (heroin, marijuana, methadone, alprazolam), bipolar disorder with previous suicide attempt, and active smoker who presented to the ED complaining of nausea and vomiting of 2 days duration.     #Sepsis secondary to suspected infective endocarditis due to IV drug use  Sepsis present on admission (WBC 20, Tmax 100.7F, , RR 18, lactate 1.3)  Patient is active IV heroin user and admits to occasionally repeat using needles  WBC trending down and pt remains afebrile   - will observe off antibiotics   - encourage   - ID consult f/u   - 2D echocardiogram: EF 55% mild mitral valve regurg, thickening of mitral valve leaflet but no clear vegetation noted   - f/u blood and urine cultures final reports   - monitor fever curve     #T wave inversions on ECG  ECG in ED showed T wave inversions in leads V-V4, which are not present when compared to previous ECG in 2018  Patient denies any chest pain or dyspnea on exertion  - Troponin negative x2      #Polysubstance use disorder  Most recently used heroin and methadone at 9am on day of presentation  -cont to monitor patient for signs of withdrawal (COWS)  -seen by CATCH team attending: will cont methadone 30mg QD and establish as an outpt in the methadone clinc  -cont xanax 0.5mg TID PRN given pt takes high doses are home       #History of bipolar disorder with previous suicide attempt  - Denies suicidal ideation at this time  - Not currently on medication outpatient  - f/u psychiatry evaluation for medication recommendation likely needs to establish care as an outpt    #Nicotine dependence  - nicotine patch while admitted     Progress Note Handoff  Pending: final bcx, symptomatic improvement  Patient/Family discussion: spoke with pt at bedside she is agreeable to POC and remaining in the hospital for further mgmt  Disposition: from home likely d/c in 24-48hrs

## 2020-08-06 NOTE — PROGRESS NOTE ADULT - SUBJECTIVE AND OBJECTIVE BOX
Patient is a 25y old  Female who presents with a chief complaint of suspected infective endocarditis (06 Aug 2020 14:00)      HPI:  [25y woman]    CC: nausea and vomiting    PMH: IV drug use, polysubstance use disorder (heroin, marijuana, methadone, alprazolam), bipolar disorder with previous suicide attempt, active smoker    PSH: no significant past surgical history    History of present illness goes back to 2 days ago when the patient woke up with severe nausea and non-bloody, non-bilious vomiting. She claims to have thrown up ~20 times over the past 2 days. The emesis was yellow and orange in color. She tried "taking sips of water but it came right back up." She does not recall eating anything differently over the past week and no one else around her has been sick. She initially thought it was a bad batch of heroin, so she injected herself with heroin and took methadone 15mg PO at 9am on the day of presentation. Her symptoms did not improve, so she came to the ED.    In the ED, vital signs were Tmax 100.7F, , /60, RR 18, SpO2 96% on room air. Patient received vancomycin 1g IV, 2L bolus of LR, Tylenol 975mg PO, and Zofran 4mg IV. A bedside echo was performed for suspected infective endocarditis, but no vegetations were noted. ECG showed T wave inversions in leads V1-V4. (04 Aug 2020 21:01)      PAST MEDICAL & SURGICAL HISTORY:  Bipolar disorder  Suicide attempt  Heroin overdose  Nicotine dependence  Cannabis dependence  Methadone dependence  Opiate abuse, continuous  Eating disorder: Previous history of bulimia nervosa  IV drug abuse  No significant past surgical history      Home Medications:      .  Tobacco use:   EtOH use:  Illicit drug use:    Living situation:    fish (Hives)  pentazocine (Other)  phenobarbital (Rash)      FAMILY HISTORY:  Family history of drug abuse: Mother uses heroin      chlorhexidine 4% Liquid 1 Application(s) Topical <User Schedule>  lactated ringers. 1000 milliLiter(s) IV Continuous <Continuous>  ondansetron    Tablet 4 milliGRAM(s) Oral every 8 hours PRN  pantoprazole    Tablet 40 milliGRAM(s) Oral before breakfast      Vital Signs Last 24 Hrs  T(C): 37 (06 Aug 2020 14:23), Max: 37.3 (05 Aug 2020 20:25)  T(F): 98.6 (06 Aug 2020 14:23), Max: 99.2 (05 Aug 2020 20:25)  HR: 50 (06 Aug 2020 14:23) (50 - 62)  BP: 98/56 (06 Aug 2020 14:23) (98/56 - 106/65)  BP(mean): --  RR: 18 (06 Aug 2020 14:23) (18 - 18)  SpO2: --    I&O's Summary                            12.0   8.39  )-----------( 221      ( 06 Aug 2020 06:22 )             37.3       08-06    139  |  104  |  11  ----------------------------<  95  4.3   |  21  |  0.7    Ca    9.2      06 Aug 2020 06:22  Mg     2.1     08-06    TPro  6.8  /  Alb  3.8  /  TBili  0.4  /  DBili  x   /  AST  19  /  ALT  26  /  AlkPhos  90  08-06      CARDIAC MARKERS ( 05 Aug 2020 05:21 )  x     / <0.01 ng/mL / x     / x     / x              Culture - Blood (collected 05 Aug 2020 05:21)  Source: .Blood None  Preliminary Report (06 Aug 2020 13:01):    No growth to date.    Culture - Urine (collected 04 Aug 2020 18:13)  Source: .Urine Clean Catch (Midstream)  Final Report (06 Aug 2020 00:29):    <10,000 CFU/mL Normal Urogenital Marlena    Culture - Blood (collected 04 Aug 2020 15:10)  Source: .Blood Blood-Peripheral  Preliminary Report (06 Aug 2020 01:02):    No growth to date.    Culture - Blood (collected 04 Aug 2020 15:10)  Source: .Blood Blood-Peripheral  Preliminary Report (06 Aug 2020 01:02):    No growth to date.    < from: Transthoracic Echocardiogram (08.06.20 @ 07:24) >  Summary:   1. LV Ejection Fraction by Sultana's Method with a biplane EF of 55 %.   2. Mild mitral valve regurgitation.   3. Mild thickening of the anterior and posterior mitral valve leaflets.    < end of copied text >        PHYSICAL EXAM:  GENERAL: NAD, lying in bed comfortably  HEAD:  Atraumatic, Normocephalic  EYES: EOMI, PERRLA, conjunctiva and sclera clear  ENT: Moist mucous membranes  NECK: Supple, No JVD  CHEST/LUNG: Clear to auscultation bilaterally; No rales, rhonchi, wheezing, or rubs. Unlabored respirations  HEART: Regular rate and rhythm; No murmurs, rubs, or gallops  ABDOMEN: Bowel sounds present; Soft, Nontender, Nondistended. No hepatomegally  EXTREMITIES:  2+ Peripheral Pulses, brisk capillary refill. No clubbing, cyanosis, or edema  NERVOUS SYSTEM:  Alert & Oriented X3, speech clear. No deficits   MSK: FROM all 4 extremities, full and equal strength  SKIN: No rashes or lesions Patient is a 25y old  Female who presents with a chief complaint of suspected infective endocarditis (06 Aug 2020 14:00)      HPI:  [25y woman]    CC: nausea and vomiting    PMH: IV drug use, polysubstance use disorder (heroin, marijuana, methadone, alprazolam), bipolar disorder with previous suicide attempt, active smoker    PSH: no significant past surgical history    History of present illness goes back to 2 days ago when the patient woke up with severe nausea and non-bloody, non-bilious vomiting. She claims to have thrown up ~20 times over the past 2 days. The emesis was yellow and orange in color. She tried "taking sips of water but it came right back up." She does not recall eating anything differently over the past week and no one else around her has been sick. She initially thought it was a bad batch of heroin, so she injected herself with heroin and took methadone 15mg PO at 9am on the day of presentation. Her symptoms did not improve, so she came to the ED.    In the ED, vital signs were Tmax 100.7F, , /60, RR 18, SpO2 96% on room air. Patient received vancomycin 1g IV, 2L bolus of LR, Tylenol 975mg PO, and Zofran 4mg IV. A bedside echo was performed for suspected infective endocarditis, but no vegetations were noted. ECG showed T wave inversions in leads V1-V4. (04 Aug 2020 21:01)      PAST MEDICAL & SURGICAL HISTORY:  Bipolar disorder  Suicide attempt  Heroin overdose  Nicotine dependence  Cannabis dependence  Methadone dependence  Opiate abuse, continuous  Eating disorder: Previous history of bulimia nervosa  IV drug abuse  No significant past surgical history      Home Medications:      .  Tobacco use:   EtOH use:  Illicit drug use:    Living situation:    fish (Hives)  pentazocine (Other)  phenobarbital (Rash)      FAMILY HISTORY:  Family history of drug abuse: Mother uses heroin      chlorhexidine 4% Liquid 1 Application(s) Topical <User Schedule>  lactated ringers. 1000 milliLiter(s) IV Continuous <Continuous>  ondansetron    Tablet 4 milliGRAM(s) Oral every 8 hours PRN  pantoprazole    Tablet 40 milliGRAM(s) Oral before breakfast      Vital Signs Last 24 Hrs  T(C): 37 (06 Aug 2020 14:23), Max: 37.3 (05 Aug 2020 20:25)  T(F): 98.6 (06 Aug 2020 14:23), Max: 99.2 (05 Aug 2020 20:25)  HR: 50 (06 Aug 2020 14:23) (50 - 62)  BP: 98/56 (06 Aug 2020 14:23) (98/56 - 106/65)  BP(mean): --  RR: 18 (06 Aug 2020 14:23) (18 - 18)  SpO2: --    I&O's Summary                            12.0   8.39  )-----------( 221      ( 06 Aug 2020 06:22 )             37.3       08-06    139  |  104  |  11  ----------------------------<  95  4.3   |  21  |  0.7    Ca    9.2      06 Aug 2020 06:22  Mg     2.1     08-06    TPro  6.8  /  Alb  3.8  /  TBili  0.4  /  DBili  x   /  AST  19  /  ALT  26  /  AlkPhos  90  08-06      CARDIAC MARKERS ( 05 Aug 2020 05:21 )  x     / <0.01 ng/mL / x     / x     / x              Culture - Blood (collected 05 Aug 2020 05:21)  Source: .Blood None  Preliminary Report (06 Aug 2020 13:01):    No growth to date.    Culture - Urine (collected 04 Aug 2020 18:13)  Source: .Urine Clean Catch (Midstream)  Final Report (06 Aug 2020 00:29):    <10,000 CFU/mL Normal Urogenital Marlena    Culture - Blood (collected 04 Aug 2020 15:10)  Source: .Blood Blood-Peripheral  Preliminary Report (06 Aug 2020 01:02):    No growth to date.    Culture - Blood (collected 04 Aug 2020 15:10)  Source: .Blood Blood-Peripheral  Preliminary Report (06 Aug 2020 01:02):    No growth to date.    < from: Transthoracic Echocardiogram (08.06.20 @ 07:24) >  Summary:   1. LV Ejection Fraction by Sultana's Method with a biplane EF of 55 %.   2. Mild mitral valve regurgitation.   3. Mild thickening of the anterior and posterior mitral valve leaflets.    < end of copied text >        PHYSICAL EXAM:  GENERAL: NAD, lying in bed comfortably  HEAD:  Atraumatic, Normocephalic  EYES: EOMI, conjunctiva and sclera clear,   ENT: Moist mucous membranes  NECK: Supple, No JVD  CHEST/LUNG: Clear to auscultation bilaterally; No rales, rhonchi, wheezing, or rubs. Unlabored respirations  HEART: Regular rate and rhythm; No murmurs, rubs, or gallops  ABDOMEN: Bowel sounds present; Soft, Nontender, Nondistended. No hepatomegally  EXTREMITIES:  2+ Peripheral Pulses, brisk capillary refill. No clubbing, cyanosis, or edema  NERVOUS SYSTEM:  Alert & Oriented X3, speech clear. No deficits   MSK: FROM all 4 extremities, full and equal strength  SKIN: No rashes or lesions

## 2020-08-06 NOTE — PROGRESS NOTE ADULT - SUBJECTIVE AND OBJECTIVE BOX
JOSÉ MANUEL JI  25y, Female  Allergy: fish (Hives)  pentazocine (Other)  phenobarbital (Rash)      LOS  2d    CHIEF COMPLAINT: suspected infective endocarditis (06 Aug 2020 16:41)      INTERVAL EVENTS/HPI  - No acute events overnight  - T(F): , Max: 99.2 (20 @ 20:25)  - Denies any worsening symptoms  - Tolerating medication  - WBC Count: 8.39 (20 @ 06:22)  WBC Count: 15.46 (20 @ 05:21)     - Creatinine, Serum: 0.7 (20 @ 06:22)  Creatinine, Serum: 0.6 (20 @ 05:21)       ROS  General: Denies rigors, nightsweats  HEENT: Denies headache, rhinorrhea, sore throat, eye pain  CV: Denies CP, palpitations  PULM: Denies wheezing, hemoptysis  GI: Denies hematemesis, hematochezia, melena  : Denies discharge, hematuria  MSK: Denies arthralgias, myalgias  SKIN: Denies rash, lesions  NEURO: Denies paresthesias, weakness  PSYCH: Denies depression, anxiety    VITALS:  T(F): 98.6, Max: 99.2 (20 @ 20:25)  HR: 50  BP: 98/56  RR: 18Vital Signs Last 24 Hrs  T(C): 37 (06 Aug 2020 14:23), Max: 37.3 (05 Aug 2020 20:25)  T(F): 98.6 (06 Aug 2020 14:23), Max: 99.2 (05 Aug 2020 20:25)  HR: 50 (06 Aug 2020 14:23) (50 - 62)  BP: 98/56 (06 Aug 2020 14:23) (98/56 - 106/65)  BP(mean): --  RR: 18 (06 Aug 2020 14:23) (18 - 18)  SpO2: --    PHYSICAL EXAM:  Gen: NAD, resting in bed  HEENT: Normocephalic, atraumatic  Neck: supple, no lymphadenopathy  CV: Regular rate & regular rhythm  Lungs: decreased BS at bases, no fremitus  Abdomen: Soft, BS present  Ext: Warm, well perfused  Neuro: non focal, awake  Skin: no rash, no erythema  Lines: no phlebitis    FH: Non-contributory  Social Hx: Non-contributory    TESTS & MEASUREMENTS:                        12.0   8.39  )-----------( 221      ( 06 Aug 2020 06:22 )             37.3         139  |  104  |  11  ----------------------------<  95  4.3   |  21  |  0.7    Ca    9.2      06 Aug 2020 06:22  Mg     2.1         TPro  6.8  /  Alb  3.8  /  TBili  0.4  /  DBili  x   /  AST  19  /  ALT  26  /  AlkPhos  90      eGFR if Non African American: 122 mL/min/1.73M2 (20 @ 06:22)  eGFR if African American: 142 mL/min/1.73M2 (20 @ 06:22)    LIVER FUNCTIONS - ( 06 Aug 2020 06:22 )  Alb: 3.8 g/dL / Pro: 6.8 g/dL / ALK PHOS: 90 U/L / ALT: 26 U/L / AST: 19 U/L / GGT: x           Urinalysis Basic - ( 04 Aug 2020 18:13 )    Color: Colorless / Appearance: Clear / S.004 / pH: x  Gluc: x / Ketone: Negative  / Bili: Negative / Urobili: <2 mg/dL   Blood: x / Protein: Negative / Nitrite: Negative   Leuk Esterase: Negative / RBC: x / WBC x   Sq Epi: x / Non Sq Epi: x / Bacteria: x        Culture - Blood (collected 20 @ 05:21)  Source: .Blood None  Preliminary Report (20 @ 13:01):    No growth to date.    Culture - Urine (collected 20 @ 18:13)  Source: .Urine Clean Catch (Midstream)  Final Report (20 @ 00:29):    <10,000 CFU/mL Normal Urogenital Marlena    Culture - Blood (collected 20 @ 15:10)  Source: .Blood Blood-Peripheral  Preliminary Report (20 @ 01:02):    No growth to date.    Culture - Blood (collected 20 @ 15:10)  Source: .Blood Blood-Peripheral  Preliminary Report (20 @ 01:02):    No growth to date.        Blood Gas Venous - Lactate: 1.3 mmoL/L (20 @ 15:18)      INFECTIOUS DISEASES TESTING  HIV-1/2 Combo Result: Nonreact (20 @ 16:20)  COVID-19 PCR: NotDetec (20 @ 14:19)      INFLAMMATORY MARKERS      RADIOLOGY & ADDITIONAL TESTS:  I have personally reviewed the last available Chest xray  CXR  Xray Chest 1 View AP/PA:   EXAM:  XR CHEST FRONTAL 1V            PROCEDURE DATE:  2020            INTERPRETATION:  Clinical History / Reason for exam: Sepsis.    Comparison : Chest radiograph dated 2016.    Technique/Positioning: Portable frontal.    Findings:    Support devices: Overlying EKG leads.    Cardiac/mediastinum/hilum: Unremarkable.    Lung parenchyma/Pleura: Within normal limits.    Skeleton/soft tissues: No acute osseous abnormality.    Impression:    No radiographic evidence of acute cardiopulmonary disease.                    SEE PATIÑO M.D., ATTENDING RADIOLOGIST  This document has been electronically signed. Aug  5 2020  6:50AM             (20 @ 16:55)      CT      CARDIOLOGY TESTING  12 Lead ECG:   Ventricular Rate 56 BPM    Atrial Rate 56 BPM    P-R Interval 114 ms    QRS Duration 86 ms    Q-T Interval 450 ms    QTC Calculation(Bezet) 434 ms    P Axis 51 degrees    R Axis 44 degrees    T Axis 38 degrees    Diagnosis Line Sinus bradycardia  Twave abnormality, consider anterior ischemia  Abnormal ECG    Confirmed by Luis Lee ([id number]) on 2020 1:34:45 PM (20 @ 08:53)  Transthoracic Echocardiogram:    EXAM:  2-D ECHO (TTE) COMPLETE        PROCEDURE DATE:  2020      INTERPRETATION:  REPORT:  TRANSTHORACIC ECHOCARDIOGRAM REPORT        Patient Name:   JOSÉ MANUEL JI Accession #: [id number]  Medical Rec #:  [id number]    Height:      63.0 in 160.0cm  YOB: 1995     Weight:      130.0 lb 58.97 kg  Patient Age:    25 years     BSA:         1.61 m²  Patient Gender: F            BP:          102/57 mmHg      Date of Exam:        2020 7:24:23 AM  Referring Physician: [id number] ED UNASSIGNED  Sonographer:         Kim Sinclair  Fellow:              Rian Grimes    Reading Physician:   Luis Lee M.D.    Procedure:   2D Echo/Doppler/Color Doppler Complete.  Indications: I33.0 - Infective Endocarditis  Diagnosis:   Acute and subacute infective endocarditis - I33.0        Summary:   1. LV Ejection Fraction by Sultana's Method with a biplane EF of 55 %.   2. Mild mitral valve regurgitation.   3. Mild thickening of the anterior and posterior mitral valve leaflets.    PHYSICIAN INTERPRETATION:  Left Ventricle: Normal left ventricular size and wall thicknesses, with normal systolic and diastolic function.  Right Ventricle: Normal right ventricular size and function.  Left Atrium: Normal left atrial size.  Right Atrium: Normal right atrial size.  Pericardium: There is no evidence of pericardial effusion.  Mitral Valve: Structurally normal mitral valve, with normal leaflet excursion. The mitral valve is normal in structure. Mild thickening of the anterior and posterior mitralvalve leaflets. Mild mitral valve regurgitation is seen.  Tricuspid Valve: Structurally normal tricuspid valve, with normal leaflet excursion. The tricuspid valve is normal in structure. Mild tricuspid regurgitation is visualized.  Aortic Valve: Normal trileaflet aortic valve with normal opening. The aortic valve is normal. No evidence of aortic valve regurgitation is seen.  Pulmonic Valve: Structurally normal pulmonic valve, with normal leaflet excursion. The pulmonic valve is normal. Mild pulmonic valve regurgitation.  Aorta: The aortic root and ascending aorta are structurally normal, with no evidence of dilitation.  Pulmonary Artery: The main pulmonary artery is normal in size.      2D AND M-MODE MEASUREMENTS (normal ranges within parentheses):  Left                  Normal   Aorta/Left             Normal  Ventricle:                     Atrium:  IVSd        0.74 cm  (0.7-1.1) AoV Cusp       1.91  (1.5-2.6)  (Mmode):                       Separation:     cm  LVPWd       0.80 cm  (0.7-1.1) Left Atrium    3.01  (1.9-4.0)  (Mmode):                       (Mmode):        cm  LVIDd       5.21 cm  (3.4-5.7) LA Volume      24.6  (Mmode):                       Index         ml/m²  LVIDs       3.64 cm  (Mmode):  LV FS       30.1 %    (>25%)  (Mmode):  Rel. Wall    0.31     (<0.42)  Thickness  Mm  LV Mass    86.0 g/m²  Index:  Mmode    SPECTRAL DOPPLER ANALYSIS:  LV DIASTOLIC FUNCTION:  MV Peak E: 0.93 m/s Decel Time: 247 msec  MV Peak A: 0.39 m/s  E/A Ratio: 2.40    Aortic Valve:  AoV VMax:    1.27 m/s AoV Area, Vmax: 2.81 cm² Vmax Indx: 1.74 cm²/m²  AoV Pk Grad: 6.4 mmHg    LVOT Vmax: 1.14 m/s  LVOT VTI:  0.26 m  LVOT Diam: 2.00 cm    Mitral Valve:  MV P1/2 Time: 71.52 msec  MV Area, PHT: 3.08 cm²    Tricuspid Valve and PA/RV Systolic Pressure: TR Max Velocity: 2.69 m/s RA Pressure: 3 mmHg RVSP/PASP: 32.0 mmHg    Pulmonic Valve:  PV Max Velocity: 0.92 m/s PV Max PG: 3.4 mmHg PV Mean PG:      [id number] Luis Lee M.D., Electronically signed on 2020 at 9:24:56 AM            *** Final ***                  LUIS LEE MD  This document has been electronically signed. Aug  6 2020  7:24AM             (20 @ 07:24)      MEDICATIONS  chlorhexidine 4% Liquid 1 Topical <User Schedule>  lactated ringers. 1000 IV Continuous <Continuous>  pantoprazole    Tablet 40 Oral before breakfast      WEIGHT  Weight (kg): 65.1 (20 @ 01:00)  Creatinine, Serum: 0.7 mg/dL (20 @ 06:22)      ANTIBIOTICS:      All available historical records have been reviewed

## 2020-08-06 NOTE — PHARMACOTHERAPY INTERVENTION NOTE - COMMENTS
Confirmed with dr. Webb [id number] and dr. Laureano  addiction specialist.. aware of allergy and will admit pt to methadone clinic.
Rec to DC vanco; STEPHEN R/O vegetations. NO indication for vancomycin

## 2020-08-06 NOTE — CONSULT NOTE ADULT - SUBJECTIVE AND OBJECTIVE BOX
Pt is a 25 yr old female with a h/o polysubstance use disorder since the age of 15 who was admitted to the hospital for suspected endocarditis. She began to get sick after using some heroin that didn't "feel right" and came to the ED for evaluation PMH-ODx1 2016, no seizure disorder PPH-+h/o bulemia +SA-OD 2016 no present SI no IPP PSxH-denies FH-+KALEB SH- +IVDA up to 5 bags Fentanyl 3-4X/day-last used 1 day ago +Sedative Use Disorder-3 sticks of Xanax daily on/off since age 20 denies recent heavy use-hard to find on the street +Cannabis+Tobacco X 10 years, denies cocaine +MMTP-2018 up to 160mg/day HIV/Hep C-negative +detox/rehab episodes ROS- n/v-improved no rhinitis mild anxiety/restlessness no fever no CP/SOB +cravings Meds-none Allergies-pentazocine-no adverse reaction to MTD, Fish-hives PE-Pt is A&Ox3 in mild distress COWS= 9 /64/73/98/28 HEENT-pupils dilated almost filling iris Lungs-clear no rales/wheezing COR-reg S1S2 no murmur Abd-soft non-tender BS+ Ext-B/L track marks dorsum hands-no swelling/erythema Pt is a 25 yr old female with a h/o polysubstance use disorder since the age of 15 who was admitted to the hospital for suspected endocarditis. She began to get sick after using some heroin that didn't "feel right" and came to the ED for evaluation PMH-ODx1 2016, no seizure disorder PPH-+h/o bulemia +SA-OD 2016 no present SI no IPP PSxH-denies FH-+KALEB SH- +IVDA up to 5 bags Fentanyl 3-4X/day-last used 1 day ago +Sedative Use Disorder-3 sticks of Xanax daily on/off since age 20 denies recent heavy use-hard to find on the street +Cannabis+Tobacco X 10 years, denies cocaine +MMTP-2018 up to 160mg/day HIV/Hep C-negative +detox/rehab episodes ROS- n/v-improved no rhinitis mild anxiety/restlessness no fever no CP/SOB +cravings Meds-none Allergies-pentazocine-no adverse reaction to MTD, Fish-hives PE-Pt is A&Ox3 in mild distress COWS= 9 /64/73/98/28 HEENT-pupils dilated almost filling iris Lungs-clear no rales/wheezing COR-reg S1S2 no murmur Abd-soft non-tender BS+ Ext-B/L track marks dorsum hands-no swelling/erythema, no tremors Labs- UDT-negative +Fentanyl WBC-15.46/8.39 BC-NG BNP-neg ECHO-initial +suspected endocarditis-repeat pending. A/P- Pt with IVDA admitted for suspected Endocarditis who reports using large amounts of Fentanyl daily and presently in mild withdrawal Pt offered opiate replacement therapy and she would like to go back on to Methadone maintenance. Recommend Methadone 30mg daily and I will contact the program regarding her readmission. CATCH team will follow.

## 2020-08-06 NOTE — PROGRESS NOTE ADULT - ASSESSMENT
Patient is a 24yo female with PMH of IV drug abuse, polysubstance use disorder (heroin, marijuana, methadone, alprazolam), bipolar disorder with previous suicide attempt, and active smoker who presented to the ED complaining of nausea and vomiting of 2 days duration. Patient was admitted for suspected infective endocarditis.    #Sepsis on admission secondary to suspected infective endocarditis  -ED vitals WBC 20, Tmax 100.7F, , RR 18, lactate 1.3, pt c/o nausea and vomiting  -h/o IVDA, pt admits to repeat using needles occasionally   -bedside echo in ED was negative for vegetations  -2D echo shows EF 55%, mild mitral regurg, neg for any vegetations  -urine cultures final neg and blood cultures neg to date  -stopped all antibiotics, will monitor for next 24 hrs    #polysubstance use disorder   -pt used heroin and methadone at 9am on 08/04 day of presentation  -monitor pt for symptoms of withdrawal  -started on methadone 30mg daily, COWS score 10, in mild withdrawal  -started methadone taper per addiction medicine eval, pt is not willing to go into rehab  -pt takes xanax 3-5 times a day, started on xanax 0.5 mg 3 times a day  -acute hep panel and HIV neg    #T wave inversion on EKG  -T wave inversions on EKG in ED in Leads V1-V4, repeat echo this AM remains unchanged  -pt denies any symptoms  -Trop neg x2, BNP WNL  -no pathology on 2D echo    #h/o Bipolar disorder/depression  -pt admits to having depression  -psych consulted, no acute intervention necessary at this time, pt has no suicidal ideation    #smoking  -pt does not wish to quit at the moment    DVT prophylaxis: stopped lovenox, pt does not need it  GI prophylaxis: Pantoprazole 40mg  Code status: full code    Pending: observation, anticipate for discharge tomorrow

## 2020-08-07 ENCOUNTER — TRANSCRIPTION ENCOUNTER (OUTPATIENT)
Age: 25
End: 2020-08-07

## 2020-08-07 VITALS
SYSTOLIC BLOOD PRESSURE: 107 MMHG | DIASTOLIC BLOOD PRESSURE: 64 MMHG | HEART RATE: 78 BPM | RESPIRATION RATE: 18 BRPM | TEMPERATURE: 98 F

## 2020-08-07 LAB
6-ACETYLMORPHINE, UR RESULT: NEGATIVE NG/ML — SIGNIFICANT CHANGE UP
6MAM UR CFM-MCNC: NEGATIVE NG/ML — SIGNIFICANT CHANGE UP
CODEINE UR CFM-MCNC: NEGATIVE NG/ML — SIGNIFICANT CHANGE UP
CODEINE, UR RESULT: NEGATIVE NG/ML — SIGNIFICANT CHANGE UP
EDDP UR QL CFM: 1329 NG/ML — SIGNIFICANT CHANGE UP
EDDP, UR RESULT: 1329 NG/ML — SIGNIFICANT CHANGE UP
HYDROCODONE UR QL CFM: NEGATIVE NG/ML — SIGNIFICANT CHANGE UP
HYDROCODONE, UR RESULT: NEGATIVE NG/ML — SIGNIFICANT CHANGE UP
HYDROMORPHONE UR QL CFM: NEGATIVE NG/ML — SIGNIFICANT CHANGE UP
HYDROMORPHONE, UR RESULT: NEGATIVE NG/ML — SIGNIFICANT CHANGE UP
METHADONE IN-HOUSE INTERPRETATION: POSITIVE
METHADONE UR CFM-MCNC: POSITIVE
MORPHINE UR QL CFM: 418 NG/ML — SIGNIFICANT CHANGE UP
MORPHINE, UR RESULT: 418 NG/ML — SIGNIFICANT CHANGE UP
NOROXYCODONE (OPIATES), UR RESULT: NEGATIVE NG/ML — SIGNIFICANT CHANGE UP
NOROXYCODONE UR CFM-MCNC: NEGATIVE NG/ML — SIGNIFICANT CHANGE UP
OPIATES IN-HOUSE INTERPRETATION: POSITIVE
OPIATES UR QL CFM: POSITIVE
OXYCODONE (OPIATES), UR RESULT: NEGATIVE NG/ML — SIGNIFICANT CHANGE UP
OXYCODONE UR-MCNC: NEGATIVE NG/ML — SIGNIFICANT CHANGE UP
OXYMORPHONE (OPIATES), UR RESULT: NEGATIVE NG/ML — SIGNIFICANT CHANGE UP
OXYMORPHONE UR CFM-MCNC: NEGATIVE NG/ML — SIGNIFICANT CHANGE UP

## 2020-08-07 PROCEDURE — 99239 HOSP IP/OBS DSCHRG MGMT >30: CPT

## 2020-08-07 RX ADMIN — SODIUM CHLORIDE 100 MILLILITER(S): 9 INJECTION, SOLUTION INTRAVENOUS at 05:49

## 2020-08-07 RX ADMIN — METHADONE HYDROCHLORIDE 20 MILLIGRAM(S): 40 TABLET ORAL at 07:39

## 2020-08-07 NOTE — DISCHARGE NOTE PROVIDER - HOSPITAL COURSE
Patient is a 24yo female with PMH of IV drug abuse, polysubstance use disorder (heroin, marijuana, methadone, alprazolam), bipolar disorder with previous suicide attempt, and active smoker who presented to the ED complaining of nausea and vomiting of 2 days duration. Patient was admitted for suspected infective endocarditis. Echo came back negative for any vegetations, pt remained afebrile in hospital, urine and blood cultures show no growth. Pt was evaluated by psych, no current suicidal ideation. Per addiction medicine pt was put on methadone 30mg per day, pt wants to go to rehab. Patient is a 26yo female with PMH of IV drug abuse, polysubstance use disorder (heroin, marijuana, methadone, alprazolam), bipolar disorder with previous suicide attempt, and active smoker who presented to the ED complaining of nausea and vomiting of 2 days duration. Patient was admitted for suspected infective endocarditis. Echo came back negative for any vegetations, pt remained afebrile in hospital, urine and blood cultures show no growth. Pt was evaluated by psych, no current suicidal ideation. Per addiction medicine pt was put on methadone 30mg per day, pt wants to go to rehab. After speaking with addiction medicine you are to follow up at [facility] for an assessment to establish yourself with the methadone program. The addiction specialist will send the remainder of the methadone taper to your pharmacy.

## 2020-08-07 NOTE — PROGRESS NOTE ADULT - ASSESSMENT
ASSESSMENT  25y F with hx of IV drug use who presents with fevers, nausea, and vomiting    IMPRESSION  #Sepsis on admission (fever, tachycardia, WBC>12) rule out endocarditis  - Blood Cx 8/4 (taken prior to antibiotics) NGTD  - TTE 8/5:  1. LV Ejection Fraction by Sultana's Method with a biplane EF of 55 %.   2. Mild mitral valve regurgitation.   3. Mild thickening of the anterior and posterior mitral valve leaflets.  - fevers possibly inflammatory with rapid improvement in WBC; no clinical criteria for endocarditis at this point     #substance abuse, hx of IV drug use  #Abx allergy: pentazocine (Other)  phenobarbital (Rash)    Creatinine, Serum: 0.6 mg/dL (08.05.20 @ 05:21)  Weight (kg): 65.1 (05 Aug 2020 01:00)    RECOMMENDATIONS  - TTE negative for vegetations  - follow-up blood cultures until final  - repeat blood cultures 1 week after discharge      Please call if with any questions.  Spectra [phone]

## 2020-08-07 NOTE — PROGRESS NOTE ADULT - SUBJECTIVE AND OBJECTIVE BOX
JOSÉ MANUEL JI  25y, Female  Allergy: fish (Hives)  pentazocine (Other)  phenobarbital (Rash)      LOS  3d    CHIEF COMPLAINT: suspected infective endocarditis (07 Aug 2020 12:13)      INTERVAL EVENTS/HPI  - No acute events overnight  - T(F): , Max: 98.6 (08-06-20 @ 14:23)  - Denies any worsening symptoms  - Tolerating medication  - WBC Count: 8.39 (08-06-20 @ 06:22)  WBC Count: 15.46 (08-05-20 @ 05:21)     - Creatinine, Serum: 0.7 (08-06-20 @ 06:22)       ROS  General: Denies rigors, nightsweats  HEENT: Denies headache, rhinorrhea, sore throat, eye pain  CV: Denies CP, palpitations  PULM: Denies wheezing, hemoptysis  GI: Denies hematemesis, hematochezia, melena  : Denies discharge, hematuria  MSK: Denies arthralgias, myalgias  SKIN: Denies rash, lesions  NEURO: Denies paresthesias, weakness  PSYCH: Denies depression, anxiety    VITALS:  T(F): 98, Max: 98.6 (08-06-20 @ 14:23)  HR: 62  BP: 118/77  RR: 18Vital Signs Last 24 Hrs  T(C): 36.7 (07 Aug 2020 04:36), Max: 37 (06 Aug 2020 14:23)  T(F): 98 (07 Aug 2020 04:36), Max: 98.6 (06 Aug 2020 14:23)  HR: 62 (07 Aug 2020 04:36) (50 - 75)  BP: 118/77 (07 Aug 2020 04:36) (95/67 - 118/77)  BP(mean): --  RR: 18 (07 Aug 2020 04:36) (16 - 18)  SpO2: 97% (06 Aug 2020 23:58) (97% - 99%)    PHYSICAL EXAM:  Gen: NAD, resting in bed  HEENT: Normocephalic, atraumatic  Neck: supple, no lymphadenopathy  CV: Regular rate & regular rhythm  Lungs: decreased BS at bases, no fremitus  Abdomen: Soft, BS present  Ext: Warm, well perfused  Neuro: non focal, awake  Skin: no rash, no erythema  Lines: no phlebitis    FH: Non-contributory  Social Hx: Non-contributory    TESTS & MEASUREMENTS:                        12.0   8.39  )-----------( 221      ( 06 Aug 2020 06:22 )             37.3     08-06    139  |  104  |  11  ----------------------------<  95  4.3   |  21  |  0.7    Ca    9.2      06 Aug 2020 06:22  Mg     2.1     08-06    TPro  6.8  /  Alb  3.8  /  TBili  0.4  /  DBili  x   /  AST  19  /  ALT  26  /  AlkPhos  90  08-06      LIVER FUNCTIONS - ( 06 Aug 2020 06:22 )  Alb: 3.8 g/dL / Pro: 6.8 g/dL / ALK PHOS: 90 U/L / ALT: 26 U/L / AST: 19 U/L / GGT: x               Culture - Blood (collected 08-05-20 @ 05:21)  Source: .Blood None  Preliminary Report (08-06-20 @ 13:01):    No growth to date.    Culture - Urine (collected 08-04-20 @ 18:13)  Source: .Urine Clean Catch (Midstream)  Final Report (08-06-20 @ 00:29):    <10,000 CFU/mL Normal Urogenital Marlena    Culture - Blood (collected 08-04-20 @ 15:10)  Source: .Blood Blood-Peripheral  Preliminary Report (08-06-20 @ 01:02):    No growth to date.    Culture - Blood (collected 08-04-20 @ 15:10)  Source: .Blood Blood-Peripheral  Preliminary Report (08-06-20 @ 01:02):    No growth to date.        Blood Gas Venous - Lactate: 1.3 mmoL/L (08-04-20 @ 15:18)      INFECTIOUS DISEASES TESTING  HIV-1/2 Combo Result: Nonreact (08-04-20 @ 16:20)  COVID-19 PCR: NotDetec (08-04-20 @ 14:19)      INFLAMMATORY MARKERS      RADIOLOGY & ADDITIONAL TESTS:  I have personally reviewed the last available Chest xray  CXR  Xray Chest 1 View AP/PA:   EXAM:  XR CHEST FRONTAL 1V            PROCEDURE DATE:  08/04/2020            INTERPRETATION:  Clinical History / Reason for exam: Sepsis.    Comparison : Chest radiograph dated August 26, 2016.    Technique/Positioning: Portable frontal.    Findings:    Support devices: Overlying EKG leads.    Cardiac/mediastinum/hilum: Unremarkable.    Lung parenchyma/Pleura: Within normal limits.    Skeleton/soft tissues: No acute osseous abnormality.    Impression:    No radiographic evidence of acute cardiopulmonary disease.                    SEE PATIÑO M.D., ATTENDING RADIOLOGIST  This document has been electronically signed. Aug  5 2020  6:50AM             (08-04-20 @ 16:55)      CT      CARDIOLOGY TESTING  12 Lead ECG:   Ventricular Rate 56 BPM    Atrial Rate 56 BPM    P-R Interval 114 ms    QRS Duration 86 ms    Q-T Interval 450 ms    QTC Calculation(Bezet) 434 ms    P Axis 51 degrees    R Axis 44 degrees    T Axis 38 degrees    Diagnosis Line Sinus bradycardia  Twave abnormality, consider anterior ischemia  Abnormal ECG    Confirmed by Luis Lee ([id number]) on 8/6/2020 1:34:45 PM (08-06-20 @ 08:53)  Transthoracic Echocardiogram:    EXAM:  2-D ECHO (TTE) COMPLETE        PROCEDURE DATE:  08/06/2020      INTERPRETATION:  REPORT:  TRANSTHORACIC ECHOCARDIOGRAM REPORT        Patient Name:   JOSÉ MANUEL JI Accession #: [id number]  Medical Rec #:  [id number]    Height:      63.0 in 160.0cm  YOB: 1995     Weight:      130.0 lb 58.97 kg  Patient Age:    25 years     BSA:         1.61 m²  Patient Gender: F            BP:          102/57 mmHg      Date of Exam:        8/6/2020 7:24:23 AM  Referring Physician: [id number] ED UNASSIGNED  Sonographer:         Kim Sinclair  Fellow:              Rian Grimes    Reading Physician:   Luis Lee M.D.    Procedure:   2D Echo/Doppler/Color Doppler Complete.  Indications: I33.0 - Infective Endocarditis  Diagnosis:   Acute and subacute infective endocarditis - I33.0        Summary:   1. LV Ejection Fraction by Sultana's Method with a biplane EF of 55 %.   2. Mild mitral valve regurgitation.   3. Mild thickening of the anterior and posterior mitral valve leaflets.    PHYSICIAN INTERPRETATION:  Left Ventricle: Normal left ventricular size and wall thicknesses, with normal systolic and diastolic function.  Right Ventricle: Normal right ventricular size and function.  Left Atrium: Normal left atrial size.  Right Atrium: Normal right atrial size.  Pericardium: There is no evidence of pericardial effusion.  Mitral Valve: Structurally normal mitral valve, with normal leaflet excursion. The mitral valve is normal in structure. Mild thickening of the anterior and posterior mitralvalve leaflets. Mild mitral valve regurgitation is seen.  Tricuspid Valve: Structurally normal tricuspid valve, with normal leaflet excursion. The tricuspid valve is normal in structure. Mild tricuspid regurgitation is visualized.  Aortic Valve: Normal trileaflet aortic valve with normal opening. The aortic valve is normal. No evidence of aortic valve regurgitation is seen.  Pulmonic Valve: Structurally normal pulmonic valve, with normal leaflet excursion. The pulmonic valve is normal. Mild pulmonic valve regurgitation.  Aorta: The aortic root and ascending aorta are structurally normal, with no evidence of dilitation.  Pulmonary Artery: The main pulmonary artery is normal in size.      2D AND M-MODE MEASUREMENTS (normal ranges within parentheses):  Left                  Normal   Aorta/Left             Normal  Ventricle:                     Atrium:  IVSd        0.74 cm  (0.7-1.1) AoV Cusp       1.91  (1.5-2.6)  (Mmode):                       Separation:     cm  LVPWd       0.80 cm  (0.7-1.1) Left Atrium    3.01  (1.9-4.0)  (Mmode):                       (Mmode):        cm  LVIDd       5.21 cm  (3.4-5.7) LA Volume      24.6  (Mmode):                       Index         ml/m²  LVIDs       3.64 cm  (Mmode):  LV FS       30.1 %    (>25%)  (Mmode):  Rel. Wall    0.31     (<0.42)  Thickness  Mm  LV Mass    86.0 g/m²  Index:  Mmode    SPECTRAL DOPPLER ANALYSIS:  LV DIASTOLIC FUNCTION:  MV Peak E: 0.93 m/s Decel Time: 247 msec  MV Peak A: 0.39 m/s  E/A Ratio: 2.40    Aortic Valve:  AoV VMax:    1.27 m/s AoV Area, Vmax: 2.81 cm² Vmax Indx: 1.74 cm²/m²  AoV Pk Grad: 6.4 mmHg    LVOT Vmax: 1.14 m/s  LVOT VTI:  0.26 m  LVOT Diam: 2.00 cm    Mitral Valve:  MV P1/2 Time: 71.52 msec  MV Area, PHT: 3.08 cm²    Tricuspid Valve and PA/RV Systolic Pressure: TR Max Velocity: 2.69 m/s RA Pressure: 3 mmHg RVSP/PASP: 32.0 mmHg    Pulmonic Valve:  PV Max Velocity: 0.92 m/s PV Max PG: 3.4 mmHg PV Mean PG:      [id number] Luis Lee M.D., Electronically signed on 8/6/2020 at 9:24:56 AM            *** Final ***                  LUIS LEE MD  This document has been electronically signed. Aug  6 2020  7:24AM             (08-06-20 @ 07:24)      MEDICATIONS  chlorhexidine 4% Liquid 1 Topical <User Schedule>  lactated ringers. 1000 IV Continuous <Continuous>  pantoprazole    Tablet 40 Oral before breakfast      WEIGHT  Weight (kg): 65.1 (08-05-20 @ 01:00)      ANTIBIOTICS:      All available historical records have been reviewed

## 2020-08-07 NOTE — PROGRESS NOTE ADULT - REASON FOR ADMISSION
suspected infective endocarditis

## 2020-08-07 NOTE — DISCHARGE NOTE PROVIDER - NSFOLLOWUPCLINICS_GEN_ALL_ED_FT
[facility]  Medicine  [address], [place]   Phone: [phone]  Fax:   Follow Up Time: 1 month

## 2020-08-07 NOTE — PROGRESS NOTE ADULT - ASSESSMENT
24yo female with PMH of IV drug use, polysubstance use disorder (heroin, marijuana, methadone, alprazolam), bipolar disorder with previous suicide attempt, and active smoker who presented to the ED complaining of nausea and vomiting of 2 days duration.     #nausea vomiting likely 2/2 withdrawal   Sepsis not present on admission (WBC 20, Tmax 100.7F, , RR 18, lactate 1.3 no clear source of infection)  Patient is active IV heroin user and admits to occasionally repeat using needles  WBC WNL remains afebrile   - encourage   - ID consult f/u-->okay to observe off antibiotics   - 2D echocardiogram: EF 55% mild mitral valve regurg, thickening of mitral valve leaflet but no clear vegetation noted   - f/u blood and urine cultures final reports-->NGTD      #T wave inversions on ECG  ECG in ED showed T wave inversions in leads V-V4, which are not present when compared to previous ECG in 2018  Patient denies any chest pain or dyspnea on exertion  - Troponin negative x2      #Polysubstance use disorder  Most recently used heroin and methadone at 9am on day of presentation  -cont to monitor patient for signs of withdrawal (COWS)  -seen by CATCH team attending: will cont methadone 30mg QD and establish as an outpt in the methadone clinc  -cont xanax 0.5mg TID PRN given pt takes high doses are home   -spoke with addiction medicine attending who states that pt can come to [facility] for an assessment to establish herself in the methadone clinic. She also states that she will send pt methadone to complete the taper as an outpt.       #History of bipolar disorder with previous suicide attempt  - Denies suicidal ideation at this time  - Not currently on medication outpatient  - f/u psychiatry evaluation for medication recommendation likely needs to establish care as an outpt    #Nicotine dependence  - nicotine patch while admitted     Progress Note Handoff  Patient/Family discussion: spoke with pt at bedside she is agreeable to go straight to [facility] for assessment for methadone clinic as she wishes to quit understands she needs to go ASAP. she states her mother is here to pick her up   Disposition: home

## 2020-08-07 NOTE — PROGRESS NOTE ADULT - ASSESSMENT
Patient is a 24yo female with PMH of IV drug abuse, polysubstance use disorder (heroin, marijuana, methadone, alprazolam), bipolar disorder with previous suicide attempt, and active smoker who presented to the ED complaining of nausea and vomiting of 2 days duration. Patient was admitted for suspected infective endocarditis.    #Sepsis on admission secondary to suspected infective endocarditis  -ED vitals WBC 20, Tmax 100.7F, , RR 18, lactate 1.3, pt c/o nausea and vomiting  -h/o IVDA, pt admits to repeat using needles occasionally   -bedside echo in ED was negative for vegetations  -2D echo shows EF 55%, mild mitral regurg, neg for any vegetations  -urine cultures final neg and blood cultures neg to date  -stopped all antibiotics, pt is afebrile    #polysubstance use disorder   -pt used heroin and methadone at 9am on 08/04 day of presentation  -monitor pt for symptoms of withdrawal  -started on methadone 30mg daily, COWS score 10, in mild withdrawal  -started methadone taper per addiction medicine eval  -pt takes xanax 3-5 times a day, started on xanax 0.5 mg 3 times a day  -acute hep panel and HIV neg  -pt will go to [facility] for assessment for methadone clinic today as she is willing to quit    #T wave inversion on EKG  -T wave inversions on EKG in ED in Leads V1-V4, repeat echo this AM remains unchanged  -pt denies any symptoms  -Trop neg x2, BNP WNL  -no pathology on 2D echo    #h/o Bipolar disorder/depression  -pt admits to having depression  -psych consulted, no acute intervention necessary at this time, pt has no suicidal ideation    #smoking  -pt does not wish to quit at the moment    DVT prophylaxis: stopped lovenox, pt does not need it  GI prophylaxis: Pantoprazole 40mg  Code status: full code

## 2020-08-07 NOTE — DISCHARGE NOTE PROVIDER - NSDCCPCAREPLAN_GEN_ALL_CORE_FT
PRINCIPAL DISCHARGE DIAGNOSIS  Diagnosis: Sepsis  Assessment and Plan of Treatment: Sepsis is a serious condition that occurs when the body overreacts to an infection. It is also called systemic inflammatory response syndrome (SIRS) with infection. An infection is usually caused by bacteria that attack the body. The body's defense system normally fights off infection within the affected body part. With sepsis, the body overreacts and causes symptoms to occur throughout the body. This leads to uncontrolled and widespread inflammation and clotting in small blood vessels. Blood flow to different body parts decreases and may lead to organ failure. Sepsis requires immediate treatment.  You were treated in the hospital with IV antibiotics, and your symptoms resolved.   Please seek immediate medical attention if you develop fever >100.4 F, feel dizzy, have nausea or vomiting, coughing blood, have sudden trouble breathing or chest pain, severe weakness, or your skin or lips are turning blue.      SECONDARY DISCHARGE DIAGNOSES  Diagnosis: Opiate dependence  Assessment and Plan of Treatment: Opiate drugs act by binding to specific receptors in the brain and other parts of the body. By activating opioid receptors in the body, these drugs can have effects on pain, breathing, temperature regulation, mood, motivation and digestive activity. These drugs alter the mechanisms in the brain in such a way that it causes dependence. Opioid use comes with many risks including risks associated with sharing needles that can cause infections like HIV, Hepatitis C. Chronic use can cause brain abnormalities. Please seek immediate medical attention if you expreince any of the following: chest pain, difficulty breathing, high fever, palpitations.

## 2020-08-07 NOTE — PROGRESS NOTE ADULT - SUBJECTIVE AND OBJECTIVE BOX
Patient is a 25y old  Female who presents with a chief complaint of suspected infective endocarditis (07 Aug 2020 12:37)      HPI:  [25y woman]    CC: nausea and vomiting    PMH: IV drug use, polysubstance use disorder (heroin, marijuana, methadone, alprazolam), bipolar disorder with previous suicide attempt, active smoker    PSH: no significant past surgical history    History of present illness goes back to 2 days ago when the patient woke up with severe nausea and non-bloody, non-bilious vomiting. She claims to have thrown up ~20 times over the past 2 days. The emesis was yellow and orange in color. She tried "taking sips of water but it came right back up." She does not recall eating anything differently over the past week and no one else around her has been sick. She initially thought it was a bad batch of heroin, so she injected herself with heroin and took methadone 15mg PO at 9am on the day of presentation. Her symptoms did not improve, so she came to the ED.    In the ED, vital signs were Tmax 100.7F, , /60, RR 18, SpO2 96% on room air. Patient received vancomycin 1g IV, 2L bolus of LR, Tylenol 975mg PO, and Zofran 4mg IV. A bedside echo was performed for suspected infective endocarditis, but no vegetations were noted. ECG showed T wave inversions in leads V1-V4. (04 Aug 2020 21:01)      PAST MEDICAL & SURGICAL HISTORY:  Bipolar disorder  Suicide attempt  Heroin overdose  Nicotine dependence  Cannabis dependence  Methadone dependence  Opiate abuse, continuous  Eating disorder: Previous history of bulimia nervosa  IV drug abuse  No significant past surgical history      Home Medications:      .  Tobacco use:   EtOH use:  Illicit drug use:    Living situation:    fish (Hives)  pentazocine (Other)  phenobarbital (Rash)      FAMILY HISTORY:  Family history of drug abuse: Mother uses heroin      chlorhexidine 4% Liquid 1 Application(s) Topical <User Schedule>  lactated ringers. 1000 milliLiter(s) IV Continuous <Continuous>  ondansetron    Tablet 4 milliGRAM(s) Oral every 8 hours PRN  pantoprazole    Tablet 40 milliGRAM(s) Oral before breakfast      Vital Signs Last 24 Hrs  T(C): 36.7 (07 Aug 2020 04:36), Max: 37 (06 Aug 2020 14:23)  T(F): 98 (07 Aug 2020 04:36), Max: 98.6 (06 Aug 2020 14:23)  HR: 62 (07 Aug 2020 04:36) (50 - 75)  BP: 118/77 (07 Aug 2020 04:36) (95/67 - 118/77)  BP(mean): --  RR: 18 (07 Aug 2020 04:36) (16 - 18)  SpO2: 97% (06 Aug 2020 23:58) (97% - 99%)    I&O's Summary                            12.0   8.39  )-----------( 221      ( 06 Aug 2020 06:22 )             37.3       08-06    139  |  104  |  11  ----------------------------<  95  4.3   |  21  |  0.7    Ca    9.2      06 Aug 2020 06:22  Mg     2.1     08-06    TPro  6.8  /  Alb  3.8  /  TBili  0.4  /  DBili  x   /  AST  19  /  ALT  26  /  AlkPhos  90  08-06              Culture - Blood (collected 05 Aug 2020 05:21)  Source: .Blood None  Preliminary Report (06 Aug 2020 13:01):    No growth to date.    Culture - Urine (collected 04 Aug 2020 18:13)  Source: .Urine Clean Catch (Midstream)  Final Report (06 Aug 2020 00:29):    <10,000 CFU/mL Normal Urogenital Marlena    Culture - Blood (collected 04 Aug 2020 15:10)  Source: .Blood Blood-Peripheral  Preliminary Report (06 Aug 2020 01:02):    No growth to date.    Culture - Blood (collected 04 Aug 2020 15:10)  Source: .Blood Blood-Peripheral  Preliminary Report (06 Aug 2020 01:02):    No growth to date.    < from: Transthoracic Echocardiogram (08.06.20 @ 07:24) >  Summary:   1. LV Ejection Fraction by Sultana's Method with a biplane EF of 55 %.   2. Mild mitral valve regurgitation.   3. Mild thickening of the anterior and posterior mitral valve leaflets.    < end of copied text >        PHYSICAL EXAM:  GENERAL: NAD, lying in bed comfortably  HEAD:  Atraumatic, Normocephalic  EYES: EOMI, PERRLA, conjunctiva and sclera clear  ENT: Moist mucous membranes  NECK: Supple, No JVD  CHEST/LUNG: Clear to auscultation bilaterally; No rales, rhonchi, wheezing, or rubs. Unlabored respirations  HEART: Regular rate and rhythm; No murmurs, rubs, or gallops  ABDOMEN: Bowel sounds present; Soft, Nontender, Nondistended. No hepatomegally  EXTREMITIES:  2+ Peripheral Pulses, brisk capillary refill. No clubbing, cyanosis, or edema  NERVOUS SYSTEM:  Alert & Oriented X3, speech clear. No deficits   MSK: FROM all 4 extremities, full and equal strength  SKIN: No rashes or lesions

## 2020-08-07 NOTE — PROGRESS NOTE ADULT - SUBJECTIVE AND OBJECTIVE BOX
JIJOSÉ MANUEL  25y, Female  Allergy: fish (Hives)  pentazocine (Other)  phenobarbital (Rash)  Hospital Day: 3d      Patient was seen and examined at bedside. denies any active complaints. pt is agreeable to go to the methadone clinic and establish herself in an outpt program.         VITALS:  T(F): 98 (08-07-20 @ 04:36), Max: 98.6 (08-06-20 @ 14:23)  HR: 62 (08-07-20 @ 04:36)  BP: 118/77 (08-07-20 @ 04:36) (95/67 - 118/77)  RR: 18 (08-07-20 @ 04:36)  SpO2: 97% (08-06-20 @ 23:58)    PHYSICAL EXAM:  GENERAL: well developed well nourished in no acute distress  NECK: No evidence of swelling no palpable lymphadenopathy  CHEST/LUNG: clear to ausculation bilaterally no wheezes, rales, or rhonchi   HEART/VASC: RRR, No murmurs, rubs, or gallops appreciated, 2+ equal bilateral radial pulse  ABDOMEN: Soft, non tender non distended +bowel sounds in all quadrants, no palpable organomegaly   EXTREMITIES:  No clubbing and range of motion intact +track marks     TESTS & MEASUREMENTS:  Weight (Kg):   BMI (kg/m2): 25.4 (08-05)                          12.0   8.39  )-----------( 221      ( 06 Aug 2020 06:22 )             37.3       08-06    139  |  104  |  11  ----------------------------<  95  4.3   |  21  |  0.7    Ca    9.2      06 Aug 2020 06:22  Mg     2.1     08-06    TPro  6.8  /  Alb  3.8  /  TBili  0.4  /  DBili  x   /  AST  19  /  ALT  26  /  AlkPhos  90  08-06    LIVER FUNCTIONS - ( 06 Aug 2020 06:22 )  Alb: 3.8 g/dL / Pro: 6.8 g/dL / ALK PHOS: 90 U/L / ALT: 26 U/L / AST: 19 U/L / GGT: x                 Culture - Blood (collected 08-05-20 @ 05:21)  Source: .Blood None  Preliminary Report (08-06-20 @ 13:01):    No growth to date.    Culture - Urine (collected 08-04-20 @ 18:13)  Source: .Urine Clean Catch (Midstream)  Final Report (08-06-20 @ 00:29):    <10,000 CFU/mL Normal Urogenital Marlena    Culture - Blood (collected 08-04-20 @ 15:10)  Source: .Blood Blood-Peripheral  Preliminary Report (08-06-20 @ 01:02):    No growth to date.    Culture - Blood (collected 08-04-20 @ 15:10)  Source: .Blood Blood-Peripheral  Preliminary Report (08-06-20 @ 01:02):    No growth to date.            RADIOLOGY & ADDITIONAL TESTS:    MEDICATIONS:  MEDICATIONS  (STANDING):  chlorhexidine 4% Liquid 1 Application(s) Topical <User Schedule>  lactated ringers. 1000 milliLiter(s) (100 mL/Hr) IV Continuous <Continuous>  pantoprazole    Tablet 40 milliGRAM(s) Oral before breakfast    MEDICATIONS  (PRN):  ondansetron    Tablet 4 milliGRAM(s) Oral every 8 hours PRN Nausea and/or Vomiting

## 2020-08-07 NOTE — DISCHARGE NOTE NURSING/CASE MANAGEMENT/SOCIAL WORK - PATIENT PORTAL LINK FT
You can access the FollowMyHealth Patient Portal offered by [facility] by registering at the following website: [email or web address]. By joining [facility]’s FollowMyHealth portal, you will also be able to view your health information using other applications (apps) compatible with our system.

## 2020-08-07 NOTE — PROGRESS NOTE ADULT - PROVIDER SPECIALTY LIST ADULT
Hospitalist
Hospitalist
Infectious Disease
Internal Medicine
Hospitalist

## 2020-08-09 LAB
ALFENTANIL UR QUANT: SIGNIFICANT CHANGE UP NG/MG CREAT
FENTANYL UR CFM-MCNC: 432 NG/MG CREAT — SIGNIFICANT CHANGE UP
FENTANYL UR CFM-MCNC: ABNORMAL
NORFENTANYL UR-MCNC: >2941 NG/MG CREAT — SIGNIFICANT CHANGE UP
SUFENTANIL UR QUANT: SIGNIFICANT CHANGE UP NG/MG CREAT

## 2020-08-10 ENCOUNTER — OUTPATIENT (OUTPATIENT)
Dept: OUTPATIENT SERVICES | Facility: HOSPITAL | Age: 25
LOS: 1 days | Discharge: HOME | End: 2020-08-10

## 2020-08-10 DIAGNOSIS — Z00.8 ENCOUNTER FOR OTHER GENERAL EXAMINATION: ICD-10-CM

## 2020-08-10 DIAGNOSIS — I49.9 CARDIAC ARRHYTHMIA, UNSPECIFIED: ICD-10-CM

## 2020-08-10 PROBLEM — F19.10 OTHER PSYCHOACTIVE SUBSTANCE ABUSE, UNCOMPLICATED: Chronic | Status: ACTIVE | Noted: 2018-04-11

## 2020-08-10 PROBLEM — F50.9 EATING DISORDER, UNSPECIFIED: Chronic | Status: ACTIVE | Noted: 2018-04-11

## 2020-08-10 LAB
A1C WITH ESTIMATED AVERAGE GLUCOSE RESULT: 5.3 % — SIGNIFICANT CHANGE UP (ref 4–5.6)
ALBUMIN SERPL ELPH-MCNC: 4.5 G/DL — SIGNIFICANT CHANGE UP (ref 3.5–5.2)
ALP SERPL-CCNC: 83 U/L — SIGNIFICANT CHANGE UP (ref 30–115)
ALT FLD-CCNC: 37 U/L — SIGNIFICANT CHANGE UP (ref 0–41)
ANION GAP SERPL CALC-SCNC: 16 MMOL/L — HIGH (ref 7–14)
AST SERPL-CCNC: 16 U/L — SIGNIFICANT CHANGE UP (ref 0–41)
BILIRUB SERPL-MCNC: 0.4 MG/DL — SIGNIFICANT CHANGE UP (ref 0.2–1.2)
BUN SERPL-MCNC: 6 MG/DL — LOW (ref 10–20)
CALCIUM SERPL-MCNC: 9.6 MG/DL — SIGNIFICANT CHANGE UP (ref 8.5–10.1)
CHLORIDE SERPL-SCNC: 100 MMOL/L — SIGNIFICANT CHANGE UP (ref 98–110)
CHOLEST SERPL-MCNC: 194 MG/DL — SIGNIFICANT CHANGE UP (ref 100–200)
CO2 SERPL-SCNC: 24 MMOL/L — SIGNIFICANT CHANGE UP (ref 17–32)
CREAT SERPL-MCNC: 0.7 MG/DL — SIGNIFICANT CHANGE UP (ref 0.7–1.5)
CULTURE RESULTS: SIGNIFICANT CHANGE UP
ESTIMATED AVERAGE GLUCOSE: 105 MG/DL — SIGNIFICANT CHANGE UP (ref 68–114)
GLUCOSE SERPL-MCNC: 93 MG/DL — SIGNIFICANT CHANGE UP (ref 70–99)
HCT VFR BLD CALC: 40.2 % — SIGNIFICANT CHANGE UP (ref 37–47)
HDLC SERPL-MCNC: 36 MG/DL — LOW
HGB BLD-MCNC: 12.2 G/DL — SIGNIFICANT CHANGE UP (ref 12–16)
LIPID PNL WITH DIRECT LDL SERPL: 140 MG/DL — HIGH (ref 4–129)
MAGNESIUM SERPL-MCNC: 2 MG/DL — SIGNIFICANT CHANGE UP (ref 1.8–2.4)
MCHC RBC-ENTMCNC: 27.2 PG — SIGNIFICANT CHANGE UP (ref 27–31)
MCHC RBC-ENTMCNC: 30.3 G/DL — LOW (ref 32–37)
MCV RBC AUTO: 89.7 FL — SIGNIFICANT CHANGE UP (ref 81–99)
NRBC # BLD: 0 /100 WBCS — SIGNIFICANT CHANGE UP (ref 0–0)
PLATELET # BLD AUTO: 215 K/UL — SIGNIFICANT CHANGE UP (ref 130–400)
POTASSIUM SERPL-MCNC: 4 MMOL/L — SIGNIFICANT CHANGE UP (ref 3.5–5)
POTASSIUM SERPL-SCNC: 4 MMOL/L — SIGNIFICANT CHANGE UP (ref 3.5–5)
PROT SERPL-MCNC: 7.1 G/DL — SIGNIFICANT CHANGE UP (ref 6–8)
RBC # BLD: 4.48 M/UL — SIGNIFICANT CHANGE UP (ref 4.2–5.4)
RBC # FLD: 12.8 % — SIGNIFICANT CHANGE UP (ref 11.5–14.5)
SODIUM SERPL-SCNC: 140 MMOL/L — SIGNIFICANT CHANGE UP (ref 135–146)
SPECIMEN SOURCE: SIGNIFICANT CHANGE UP
TOTAL CHOLESTEROL/HDL RATIO MEASUREMENT: 5.4 RATIO — SIGNIFICANT CHANGE UP (ref 4–5.5)
TRIGL SERPL-MCNC: 74 MG/DL — SIGNIFICANT CHANGE UP (ref 10–149)
WBC # BLD: 9.05 K/UL — SIGNIFICANT CHANGE UP (ref 4.8–10.8)
WBC # FLD AUTO: 9.05 K/UL — SIGNIFICANT CHANGE UP (ref 4.8–10.8)

## 2020-08-11 LAB
APPEARANCE UR: CLEAR — SIGNIFICANT CHANGE UP
BACTERIA # UR AUTO: ABNORMAL
BILIRUB UR-MCNC: NEGATIVE — SIGNIFICANT CHANGE UP
COLOR SPEC: YELLOW — SIGNIFICANT CHANGE UP
COMMENT - URINE: SIGNIFICANT CHANGE UP
DIFF PNL FLD: NEGATIVE — SIGNIFICANT CHANGE UP
EPI CELLS # UR: ABNORMAL /HPF
GLUCOSE UR QL: NEGATIVE MG/DL — SIGNIFICANT CHANGE UP
HAV IGM SER-ACNC: SIGNIFICANT CHANGE UP
HBV CORE IGM SER-ACNC: SIGNIFICANT CHANGE UP
HBV SURFACE AG SER-ACNC: SIGNIFICANT CHANGE UP
HCG UR QL: NEGATIVE — SIGNIFICANT CHANGE UP
HCV AB S/CO SERPL IA: 0.14 S/CO — SIGNIFICANT CHANGE UP (ref 0–0.99)
HCV AB SERPL-IMP: SIGNIFICANT CHANGE UP
KETONES UR-MCNC: NEGATIVE — SIGNIFICANT CHANGE UP
LEUKOCYTE ESTERASE UR-ACNC: ABNORMAL
NITRITE UR-MCNC: NEGATIVE — SIGNIFICANT CHANGE UP
PH UR: 6 — SIGNIFICANT CHANGE UP (ref 5–8)
PROT UR-MCNC: NEGATIVE MG/DL — SIGNIFICANT CHANGE UP
SP GR SPEC: 1.02 — SIGNIFICANT CHANGE UP (ref 1.01–1.03)
T PALLIDUM AB TITR SER: NEGATIVE — SIGNIFICANT CHANGE UP
UROBILINOGEN FLD QL: 0.2 MG/DL — SIGNIFICANT CHANGE UP (ref 0.2–0.2)
WBC UR QL: ABNORMAL /HPF

## 2020-08-12 DIAGNOSIS — I34.0 NONRHEUMATIC MITRAL (VALVE) INSUFFICIENCY: ICD-10-CM

## 2020-08-12 DIAGNOSIS — R11.2 NAUSEA WITH VOMITING, UNSPECIFIED: ICD-10-CM

## 2020-08-12 DIAGNOSIS — F50.2 BULIMIA NERVOSA: ICD-10-CM

## 2020-08-12 DIAGNOSIS — F17.200 NICOTINE DEPENDENCE, UNSPECIFIED, UNCOMPLICATED: ICD-10-CM

## 2020-08-12 DIAGNOSIS — R65.10 SYSTEMIC INFLAMMATORY RESPONSE SYNDROME (SIRS) OF NON-INFECTIOUS ORIGIN WITHOUT ACUTE ORGAN DYSFUNCTION: ICD-10-CM

## 2020-08-12 DIAGNOSIS — F13.20 SEDATIVE, HYPNOTIC OR ANXIOLYTIC DEPENDENCE, UNCOMPLICATED: ICD-10-CM

## 2020-08-12 DIAGNOSIS — F31.9 BIPOLAR DISORDER, UNSPECIFIED: ICD-10-CM

## 2020-08-12 DIAGNOSIS — F12.20 CANNABIS DEPENDENCE, UNCOMPLICATED: ICD-10-CM

## 2020-08-12 DIAGNOSIS — F19.14 OTHER PSYCHOACTIVE SUBSTANCE ABUSE WITH PSYCHOACTIVE SUBSTANCE-INDUCED MOOD DISORDER: ICD-10-CM

## 2020-08-12 DIAGNOSIS — Z81.3 FAMILY HISTORY OF OTHER PSYCHOACTIVE SUBSTANCE ABUSE AND DEPENDENCE: ICD-10-CM

## 2020-08-12 DIAGNOSIS — F11.23 OPIOID DEPENDENCE WITH WITHDRAWAL: ICD-10-CM

## 2020-08-12 DIAGNOSIS — Z91.013 ALLERGY TO SEAFOOD: ICD-10-CM

## 2020-08-12 DIAGNOSIS — Z91.5 PERSONAL HISTORY OF SELF-HARM: ICD-10-CM

## 2020-08-12 LAB
GAMMA INTERFERON BACKGROUND BLD IA-ACNC: 0.01 IU/ML — SIGNIFICANT CHANGE UP
M TB IFN-G BLD-IMP: NEGATIVE — SIGNIFICANT CHANGE UP
M TB IFN-G CD4+ BCKGRND COR BLD-ACNC: 0 IU/ML — SIGNIFICANT CHANGE UP
M TB IFN-G CD4+CD8+ BCKGRND COR BLD-ACNC: 0 IU/ML — SIGNIFICANT CHANGE UP
QUANT TB PLUS MITOGEN MINUS NIL: 5.69 IU/ML — SIGNIFICANT CHANGE UP

## 2020-09-23 ENCOUNTER — INPATIENT (INPATIENT)
Facility: HOSPITAL | Age: 25
LOS: 1 days | Discharge: HOME | End: 2020-09-25
Attending: INTERNAL MEDICINE | Admitting: INTERNAL MEDICINE
Payer: COMMERCIAL

## 2020-09-23 VITALS
HEIGHT: 63 IN | WEIGHT: 123.02 LBS | RESPIRATION RATE: 16 BRPM | OXYGEN SATURATION: 99 % | HEART RATE: 95 BPM | TEMPERATURE: 98 F | SYSTOLIC BLOOD PRESSURE: 105 MMHG | DIASTOLIC BLOOD PRESSURE: 72 MMHG

## 2020-09-23 LAB
ALBUMIN SERPL ELPH-MCNC: 3.7 G/DL — SIGNIFICANT CHANGE UP (ref 3.5–5.2)
ALP SERPL-CCNC: 287 U/L — HIGH (ref 30–115)
ALT FLD-CCNC: 41 U/L — SIGNIFICANT CHANGE UP (ref 0–41)
ANION GAP SERPL CALC-SCNC: 12 MMOL/L — SIGNIFICANT CHANGE UP (ref 7–14)
APPEARANCE UR: ABNORMAL
AST SERPL-CCNC: 29 U/L — SIGNIFICANT CHANGE UP (ref 0–41)
BACTERIA # UR AUTO: ABNORMAL
BASOPHILS # BLD AUTO: 0.03 K/UL — SIGNIFICANT CHANGE UP (ref 0–0.2)
BASOPHILS NFR BLD AUTO: 0.2 % — SIGNIFICANT CHANGE UP (ref 0–1)
BILIRUB DIRECT SERPL-MCNC: 0.3 MG/DL — HIGH (ref 0–0.2)
BILIRUB INDIRECT FLD-MCNC: 0.4 MG/DL — SIGNIFICANT CHANGE UP (ref 0.2–1.2)
BILIRUB SERPL-MCNC: 0.7 MG/DL — SIGNIFICANT CHANGE UP (ref 0.2–1.2)
BILIRUB UR-MCNC: NEGATIVE — SIGNIFICANT CHANGE UP
BUN SERPL-MCNC: 7 MG/DL — LOW (ref 10–20)
CALCIUM SERPL-MCNC: 9.1 MG/DL — SIGNIFICANT CHANGE UP (ref 8.5–10.1)
CHLORIDE SERPL-SCNC: 92 MMOL/L — LOW (ref 98–110)
CO2 SERPL-SCNC: 29 MMOL/L — SIGNIFICANT CHANGE UP (ref 17–32)
COLOR SPEC: YELLOW — SIGNIFICANT CHANGE UP
CREAT SERPL-MCNC: 0.8 MG/DL — SIGNIFICANT CHANGE UP (ref 0.7–1.5)
DIFF PNL FLD: ABNORMAL
EOSINOPHIL # BLD AUTO: 0 K/UL — SIGNIFICANT CHANGE UP (ref 0–0.7)
EOSINOPHIL NFR BLD AUTO: 0 % — SIGNIFICANT CHANGE UP (ref 0–8)
EPI CELLS # UR: 0 /HPF — SIGNIFICANT CHANGE UP (ref 0–5)
GLUCOSE SERPL-MCNC: 116 MG/DL — HIGH (ref 70–99)
GLUCOSE UR QL: NEGATIVE — SIGNIFICANT CHANGE UP
HCT VFR BLD CALC: 35.6 % — LOW (ref 37–47)
HGB BLD-MCNC: 11 G/DL — LOW (ref 12–16)
HYALINE CASTS # UR AUTO: 0 /LPF — SIGNIFICANT CHANGE UP (ref 0–7)
IMM GRANULOCYTES NFR BLD AUTO: 0.5 % — HIGH (ref 0.1–0.3)
KETONES UR-MCNC: ABNORMAL
LACTATE SERPL-SCNC: 0.7 MMOL/L — SIGNIFICANT CHANGE UP (ref 0.7–2)
LACTATE SERPL-SCNC: 2.5 MMOL/L — HIGH (ref 0.7–2)
LEUKOCYTE ESTERASE UR-ACNC: ABNORMAL
LIDOCAIN IGE QN: 7 U/L — SIGNIFICANT CHANGE UP (ref 7–60)
LYMPHOCYTES # BLD AUTO: 0.82 K/UL — LOW (ref 1.2–3.4)
LYMPHOCYTES # BLD AUTO: 4.7 % — LOW (ref 20.5–51.1)
MAGNESIUM SERPL-MCNC: 2 MG/DL — SIGNIFICANT CHANGE UP (ref 1.8–2.4)
MCHC RBC-ENTMCNC: 26.2 PG — LOW (ref 27–31)
MCHC RBC-ENTMCNC: 30.9 G/DL — LOW (ref 32–37)
MCV RBC AUTO: 84.8 FL — SIGNIFICANT CHANGE UP (ref 81–99)
MONOCYTES # BLD AUTO: 0.76 K/UL — HIGH (ref 0.1–0.6)
MONOCYTES NFR BLD AUTO: 4.4 % — SIGNIFICANT CHANGE UP (ref 1.7–9.3)
NEUTROPHILS # BLD AUTO: 15.58 K/UL — HIGH (ref 1.4–6.5)
NEUTROPHILS NFR BLD AUTO: 90.2 % — HIGH (ref 42.2–75.2)
NITRITE UR-MCNC: NEGATIVE — SIGNIFICANT CHANGE UP
NRBC # BLD: 0 /100 WBCS — SIGNIFICANT CHANGE UP (ref 0–0)
PH UR: 6.5 — SIGNIFICANT CHANGE UP (ref 5–8)
PLATELET # BLD AUTO: 442 K/UL — HIGH (ref 130–400)
POTASSIUM SERPL-MCNC: 4.3 MMOL/L — SIGNIFICANT CHANGE UP (ref 3.5–5)
POTASSIUM SERPL-SCNC: 4.3 MMOL/L — SIGNIFICANT CHANGE UP (ref 3.5–5)
PROT SERPL-MCNC: 7.6 G/DL — SIGNIFICANT CHANGE UP (ref 6–8)
PROT UR-MCNC: ABNORMAL
RBC # BLD: 4.2 M/UL — SIGNIFICANT CHANGE UP (ref 4.2–5.4)
RBC # FLD: 13.2 % — SIGNIFICANT CHANGE UP (ref 11.5–14.5)
RBC CASTS # UR COMP ASSIST: 8 /HPF — HIGH (ref 0–4)
SODIUM SERPL-SCNC: 133 MMOL/L — LOW (ref 135–146)
SP GR SPEC: 1.01 — SIGNIFICANT CHANGE UP (ref 1.01–1.03)
UROBILINOGEN FLD QL: ABNORMAL
WBC # BLD: 17.28 K/UL — HIGH (ref 4.8–10.8)
WBC # FLD AUTO: 17.28 K/UL — HIGH (ref 4.8–10.8)
WBC UR QL: 358 /HPF — HIGH (ref 0–5)

## 2020-09-23 PROCEDURE — 74177 CT ABD & PELVIS W/CONTRAST: CPT | Mod: 26

## 2020-09-23 PROCEDURE — 99285 EMERGENCY DEPT VISIT HI MDM: CPT

## 2020-09-23 PROCEDURE — 71045 X-RAY EXAM CHEST 1 VIEW: CPT | Mod: 26

## 2020-09-23 PROCEDURE — 99223 1ST HOSP IP/OBS HIGH 75: CPT | Mod: AI

## 2020-09-23 RX ORDER — CHLORHEXIDINE GLUCONATE 213 G/1000ML
1 SOLUTION TOPICAL
Refills: 0 | Status: DISCONTINUED | OUTPATIENT
Start: 2020-09-23 | End: 2020-09-25

## 2020-09-23 RX ORDER — PANTOPRAZOLE SODIUM 20 MG/1
40 TABLET, DELAYED RELEASE ORAL
Refills: 0 | Status: DISCONTINUED | OUTPATIENT
Start: 2020-09-23 | End: 2020-09-25

## 2020-09-23 RX ORDER — SODIUM CHLORIDE 9 MG/ML
1000 INJECTION INTRAMUSCULAR; INTRAVENOUS; SUBCUTANEOUS ONCE
Refills: 0 | Status: COMPLETED | OUTPATIENT
Start: 2020-09-23 | End: 2020-09-23

## 2020-09-23 RX ORDER — SODIUM CHLORIDE 9 MG/ML
1000 INJECTION, SOLUTION INTRAVENOUS
Refills: 0 | Status: DISCONTINUED | OUTPATIENT
Start: 2020-09-23 | End: 2020-09-25

## 2020-09-23 RX ORDER — ONDANSETRON 8 MG/1
4 TABLET, FILM COATED ORAL ONCE
Refills: 0 | Status: COMPLETED | OUTPATIENT
Start: 2020-09-23 | End: 2020-09-23

## 2020-09-23 RX ORDER — DIPHENHYDRAMINE HCL 50 MG
50 CAPSULE ORAL ONCE
Refills: 0 | Status: COMPLETED | OUTPATIENT
Start: 2020-09-23 | End: 2020-09-23

## 2020-09-23 RX ORDER — CEFTRIAXONE 500 MG/1
1000 INJECTION, POWDER, FOR SOLUTION INTRAMUSCULAR; INTRAVENOUS ONCE
Refills: 0 | Status: COMPLETED | OUTPATIENT
Start: 2020-09-23 | End: 2020-09-23

## 2020-09-23 RX ORDER — ENOXAPARIN SODIUM 100 MG/ML
40 INJECTION SUBCUTANEOUS DAILY
Refills: 0 | Status: DISCONTINUED | OUTPATIENT
Start: 2020-09-23 | End: 2020-09-25

## 2020-09-23 RX ORDER — ONDANSETRON 8 MG/1
4 TABLET, FILM COATED ORAL ONCE
Refills: 0 | Status: COMPLETED | OUTPATIENT
Start: 2020-09-23 | End: 2020-09-24

## 2020-09-23 RX ORDER — KETOROLAC TROMETHAMINE 30 MG/ML
15 SYRINGE (ML) INJECTION ONCE
Refills: 0 | Status: DISCONTINUED | OUTPATIENT
Start: 2020-09-23 | End: 2020-09-23

## 2020-09-23 RX ORDER — CEFTRIAXONE 500 MG/1
1000 INJECTION, POWDER, FOR SOLUTION INTRAMUSCULAR; INTRAVENOUS EVERY 24 HOURS
Refills: 0 | Status: DISCONTINUED | OUTPATIENT
Start: 2020-09-23 | End: 2020-09-25

## 2020-09-23 RX ADMIN — SODIUM CHLORIDE 1000 MILLILITER(S): 9 INJECTION INTRAMUSCULAR; INTRAVENOUS; SUBCUTANEOUS at 11:18

## 2020-09-23 RX ADMIN — Medication 102 MILLIGRAM(S): at 15:40

## 2020-09-23 RX ADMIN — SODIUM CHLORIDE 1000 MILLILITER(S): 9 INJECTION INTRAMUSCULAR; INTRAVENOUS; SUBCUTANEOUS at 12:46

## 2020-09-23 RX ADMIN — SODIUM CHLORIDE 75 MILLILITER(S): 9 INJECTION, SOLUTION INTRAVENOUS at 17:47

## 2020-09-23 RX ADMIN — CEFTRIAXONE 100 MILLIGRAM(S): 500 INJECTION, POWDER, FOR SOLUTION INTRAMUSCULAR; INTRAVENOUS at 17:57

## 2020-09-23 RX ADMIN — ONDANSETRON 4 MILLIGRAM(S): 8 TABLET, FILM COATED ORAL at 11:17

## 2020-09-23 RX ADMIN — Medication 15 MILLIGRAM(S): at 18:18

## 2020-09-23 RX ADMIN — CEFTRIAXONE 100 MILLIGRAM(S): 500 INJECTION, POWDER, FOR SOLUTION INTRAMUSCULAR; INTRAVENOUS at 14:55

## 2020-09-23 NOTE — ED PROVIDER NOTE - MUSCULOSKELETAL NEGATIVE STATEMENT, MLM
Rx sent to pharmacy.  
no back pain, no gout, no musculoskeletal pain, no neck pain, and no weakness.

## 2020-09-23 NOTE — ED ADULT NURSE NOTE - NSIMPLEMENTINTERV_GEN_ALL_ED
Implemented All Universal Safety Interventions:  [place] to call system. Call bell, personal items and telephone within reach. Instruct patient to call for assistance. Room bathroom lighting operational. Non-slip footwear when patient is off stretcher. Physically safe environment: no spills, clutter or unnecessary equipment. Stretcher in lowest position, wheels locked, appropriate side rails in place.

## 2020-09-23 NOTE — H&P ADULT - NSHPLABSRESULTS_GEN_ALL_CORE
11.0   17.28 )-----------( 442      ( 23 Sep 2020 11:35 )             35.6           133<L>  |  92<L>  |  7<L>  ----------------------------<  116<H>  4.3   |  29  |  0.8    Ca    9.1      23 Sep 2020 11:35  Mg     2.0         TPro  7.6  /  Alb  3.7  /  TBili  0.7  /  DBili  0.3<H>  /  AST  29  /  ALT  41  /  AlkPhos  287<H>        LIVER FUNCTIONS - ( 23 Sep 2020 11:35 )  Alb: 3.7 g/dL / Pro: 7.6 g/dL / ALK PHOS: 287 U/L / ALT: 41 U/L / AST: 29 U/L / GGT: x                 Urinalysis Basic - ( 23 Sep 2020 13:27 )    Color: Yellow / Appearance: Slightly Turbid / S.009 / pH: x  Gluc: x / Ketone: Small  / Bili: Negative / Urobili: 3 mg/dL   Blood: x / Protein: 30 mg/dL / Nitrite: Negative   Leuk Esterase: Large / RBC: 8 /HPF /  /HPF   Sq Epi: x / Non Sq Epi: 0 /HPF / Bacteria: Many              < from: CT Abdomen and Pelvis w/ IV Cont (20 @ 16:08) >      IMPRESSION:  Findings consistent with bilateral pyelonephritis.    < end of copied text >

## 2020-09-23 NOTE — SBIRT NOTE ADULT - NSSBIRTDRGPASSREFTXDET_GEN_A_CORE
Pt reported that she is currently in treatment at [address] [address], [phone]. She reported that she has an appt tomorrow and plans to attend this appt. Pt requested and was provided with referral information to [facility] inpatient rehabilitation program ([address] [place] [address], [phone]), and [facility] ([address] [place] [address]; [phone]). A call was not made to these facilities because the patient is currently in treatment, is not interested in attending inpatient rehab at this time, but would like to explore it as a future option.   Provided pt with Telephonic SBIRT phone # [phone].   Naloxone Rescue Kit offered: Pt was screened and offered. Patient refused kit due to having kits at home.

## 2020-09-23 NOTE — ED PROVIDER NOTE - SEPSIS ALERT DATE/TIME
----- Message from Siobhan Shell sent at 4/24/2017  8:05 AM CDT -----  Contact: pt  Pt has questions concerning chest pains, pt request to have a xray of her lungs, pt request call back at [phone]///[id number]   23-Sep-2020 12:56

## 2020-09-23 NOTE — H&P ADULT - ATTENDING COMMENTS
25 year old female with hx of IV drug use, polysubstance use disorder (heroin, marijuana, methadone, alprazolam), bipolar disorder with previous suicide attempt, active smoker presenting for abdominal pain. Patient reports that symptoms began ~5 days ago including nausea/vomiting, anorexia, lower back pain (non-radiating, dull, achy, 5/10), and malodorous urine. She denied any fever, chills, vaginal itching, discharge, STIs, chest pain, SOB.  She denied any fevers, chills, shortness of breath, chest pain, palpitations, vaginal discharge, dyspareunia,    Of note she is current on Methadone (Receiving it from [employer]., though she admitted to using several bags of heroine via injection yesterday. She did receive her morning dose of methadone today.     In the ED CT a/p was consistent with b/l pyelonephritis and Rocephin was started. Sepsis was present on admission and she received 2L of fluid and maintenance fluids,  (WBC 17, , urinary source of infection), Lactate improved from 2.5 to 0.7.     GENERAL: NAD, well-developed, Non-toxic, stated age   HEAD:  Atraumatic, Normocephalic  EYES: EOMI, Sclera White   NECK: Supple, No JVD  CHEST/LUNG: Clear to auscultation bilaterally; No wheezing, rhonchi, or crackles  HEART: Regular rate and rhythm; s1, s2, No murmurs, rubs, or gallops  ABDOMEN: Soft, LUQ tenderness to palpation, Nondistended; Bowel sounds present, No rebound or guarding noted   EXTREMITIES:  No lower extremity edema or calf tenderness to palpation.  No clubbing or cyanosis. Track marks noted on arms   PSYCH: AAOx3, not anxious cooperative  NEUROLOGY: non-focal exam   SKIN: Track marks noted on arms, No lesions    Assessment and plan:   B/L Pyelonephritis: sepsis present on admission, lactate improved   -continue with rocephin  -f/u with urinary cultures   -continue with maintenance fluids until oral intake improved. Anti emetics PRN    IVDA: currently on methadone  Continue with daily methadone  Patient counselled on the importance of sobriety  Will confirm methadone dosage (40mg) with clinic   monitor for signs of withdrawal   continue with xanax prn    Routine medical care, GI Ppx, and DVT ppx: Agree with resident plans of care.      My note supersedes resident assessment and plan should any discrepancy arise. 25 year old female with hx of IV drug use, polysubstance use disorder (heroin, marijuana, methadone, alprazolam), bipolar disorder with previous suicide attempt, active smoker presenting for abdominal pain. Patient reports that symptoms began ~5 days ago including nausea/vomiting, anorexia, lower back pain (non-radiating, dull, achy, 5/10), and malodorous urine. She denied any fever, chills, vaginal itching, discharge, STIs, chest pain, SOB.  She denied any fevers, chills, shortness of breath, chest pain, palpitations, vaginal discharge, dyspareunia,    Of note she is current on Methadone (Receiving it from [employer]., though she admitted to using several bags of heroine via injection yesterday. She did receive her morning dose of methadone today.     In the ED CT a/p was consistent with b/l pyelonephritis and Rocephin was started. Sepsis was present on admission and she received 2L of fluid and maintenance fluids,  (WBC 17, , urinary source of infection), Lactate improved from 2.5 to 0.7.     GENERAL: NAD, well-developed, Non-toxic, stated age   HEAD:  Atraumatic, Normocephalic  EYES: EOMI, Sclera White   NECK: Supple, No JVD  CHEST/LUNG: Clear to auscultation bilaterally; No wheezing, rhonchi, or crackles  HEART: Regular rate and rhythm; s1, s2, No murmurs, rubs, or gallops  ABDOMEN: Soft, LUQ tenderness to palpation, Nondistended; Bowel sounds present, No rebound or guarding noted   EXTREMITIES:  No lower extremity edema or calf tenderness to palpation.  No clubbing or cyanosis. Track marks noted on arms   PSYCH: AAOx3, not anxious cooperative  NEUROLOGY: non-focal exam   SKIN: Track marks noted on arms, No lesions    Assessment and plan:   B/L Pyelonephritis: sepsis present on admission, lactate improved   -continue with rocephin  -f/u with urinary cultures   -continue with maintenance fluids until oral intake improved. Anti emetics PRN    IVDA: currently on methadone  Continue with daily methadone  Patient counselled on the importance of sobriety  Will confirm methadone dosage (40mg) with clinic   monitor for signs of withdrawal   continue with xanax prn    Routine medical care, GI Ppx, and DVT ppx: Agree with resident plans of care.      My note supersedes resident assessment and plan should any discrepancy arise.          Co-Signed by Adrian Candelaria MD. Agree with above.

## 2020-09-23 NOTE — ED PROVIDER NOTE - PROGRESS NOTE DETAILS
JR: pt endorsed to Dr. Oconnor. Plan f/u CT. Admit for sepsis, thus far 2/2 to UTI alone. Rocephin, IVF given. Initial LA 2.5, cont to trend. Justification for admission: pt has poor f/u, IVDA and requires IV abx.

## 2020-09-23 NOTE — ED ADULT NURSE NOTE - OBJECTIVE STATEMENT
pt A&ox3, pt states that she has abd pain and left back pain since Saturday and feels like she lost a lot of weight. pt states N/V, denies diarrhea, chills, fever. denies CP and SOB. pt states she is on methadone program. pt has no s/s of acute distress at this time. pt A&ox3, pt states that she has abd pain and left back pain since Saturday and feels like she lost a lot of weight. pt states N/V, denies diarrhea, chills, fever. denies CP and SOB. pt states she is on methadone program and is still using heroin, pt has no s/s of acute distress at this time.

## 2020-09-23 NOTE — ED ADULT TRIAGE NOTE - CHIEF COMPLAINT QUOTE
Pt c/o abdominal pain/nausea/vomiting since Saturday. Pt denies diarrhea/fever. Pt has been vomiting every day and has been unable to keep any food down. Pt is currently in methadone program.

## 2020-09-23 NOTE — H&P ADULT - NSHPREVIEWOFSYSTEMS_GEN_ALL_CORE
CONSTITUTIONAL: (+) fevers, No weakness or chills; No headaches  EYES: No visual changes, eye pain, or discharge  ENT: No vertigo; No ear pain or change in hearing; No sore throat or difficulty swallowing  NECK: No pain or stiffness  RESPIRATORY: No cough, wheezing, or hemoptysis; No shortness of breath  CARDIOVASCULAR: No chest pain or palpitations  GASTROINTESTINAL: No abdominal or epigastric pain; (+) nausea and vomiting, no hematemesis; No diarrhea or constipation; No melena or hematochezia  GENITOURINARY: No dysuria, frequency or hematuria  MUSCULOSKELETAL: No joint pain, no muscle pain, no weakness  NEUROLOGICAL: No numbness or weakness  SKIN: No itching or rashes CONSTITUTIONAL: (+) fevers, No weakness or chills; No headaches  EYES: No visual changes, eye pain, or discharge  ENT: No vertigo; No ear pain or change in hearing; No sore throat or difficulty swallowing  NECK: No pain or stiffness  RESPIRATORY: No cough, wheezing, or hemoptysis; No shortness of breath  CARDIOVASCULAR: No chest pain or palpitations  GASTROINTESTINAL:(+) nausea and abdominal pain   GENITOURINARY: No dysuria, frequency or hematuria  MUSCULOSKELETAL: No joint pain, no muscle pain, no weakness  NEUROLOGICAL: No numbness or weakness  SKIN: No itching or rashes

## 2020-09-23 NOTE — H&P ADULT - HISTORY OF PRESENT ILLNESS
CC: abdominal pain     25 year old female with hx of IV drug use, polysubstance use disorder (heroin, marijuana, methadone, alprazolam), bipolar disorder with previous suicide attempt, active smoker presenting for abdominal pain. As per ED patient stated that "I  think I have a stomach virus. I haven't been able to keep anything down since Saturday. I last shot up heroin yesterday and I got my methadone today before I got here. I have abdominal pain all over my stomach."   She denied any fever or chills,       History of present illness goes back to 2 days ago when the patient woke up with severe nausea and non-bloody, non-bilious vomiting. She claims to have thrown up ~20 times over the past 2 days. The emesis was yellow and orange in color. She tried "taking sips of water but it came right back up." She does not recall eating anything differently over the past week and no one else around her has been sick. She initially thought it was a bad batch of heroin, so she injected herself with heroin and took methadone 15mg PO at 9am on the day of presentation. Her symptoms did not improve, so she came to the ED.    In the ED, vital signs were Tmax 100.7F, , /60, RR 18, SpO2 96% on room air. Patient received vancomycin 1g IV, 2L bolus of LR, Tylenol 975mg PO, and Zofran 4mg IV. A bedside echo was performed for suspected infective endocarditis, but no vegetations were noted. ECG showed T wave inversions in leads V1-V4. CC: abdominal pain     25 year old female with hx of IV drug use, polysubstance use disorder (heroin, marijuana, methadone, alprazolam), bipolar disorder with previous suicide attempt, active smoker presenting for abdominal pain. Describes abdominal pain in Lower abdomen associated with nausea and vomit. Pain radiates to the back. She states she has anorexia and has not been able to eat. She stated "I  think I have a stomach virus. I haven't been able to keep anything down since Saturday. " She last took heroin yesterday and got methadone in amShe denied any fever or chills, no urinary symptoms, no diarrhea . Patient is sexually active.     In the ED, vital signs were stable Tmax 37.1 C, HR 95, BP 99/54 mmHg, RR 18, SpO2 96% on room air. Patient received Rocephin. Labs showed lactate of 2.5 with [place]BC of 14852 and L shift. UA grossly pos for blood / WBC and LE , she received 2 L of NSS lactate decreased to 0.7 . CT abd/pelvis done revealed bilateral pyelonephritis.  Patient being admitted for IV Abx treatment

## 2020-09-23 NOTE — SBIRT NOTE ADULT - NSSBIRTDRGBRIEFINTDET_GEN_A_CORE
Provided SBIRT services: Full screen positive. Referral to Treatment Performed. Screening results were reviewed with the patient and patient was provided information about healthy guidelines and potential negative consequences associated with level of risk. Motivation and readiness to reduce or stop use was discussed and goals and activities to make changes were suggested/offered. Referral for complete assessment and level of care determination at a certified treatment facility was completed by giving the patient information for a treatment facility that meets their needs and encouraging them to call for an appointment.

## 2020-09-23 NOTE — ED PROVIDER NOTE - CLINICAL SUMMARY MEDICAL DECISION MAKING FREE TEXT BOX
CT confirms bilateral pyelonephritis.  Patient actively vomiting in ED.  Requires admission for IV abx.  VSS.  ADMIT to floor.

## 2020-09-23 NOTE — ED PROVIDER NOTE - ATTENDING CONTRIBUTION TO CARE
25F with PMH bipolar disorder, opiate abuse/IVDA, used heroin yesterday, eating disorder as a teenager who presents to [facility] for vomiting since Saturday. She reports 15lb unintentional weight loss in the past week. She endorses lower abdominal pain that radiates towards her back, intermittent, exacerbated with vomiting. Denies fevers, chills, urinary sx    CONSTITUTIONAL: Chronically ill appearing, pale female sitting in nad.   SKIN: skin exam is warm and dry, no acute rash.  HEAD: Normocephalic; atraumatic.  EYES: PERRL, 3 mm bilateral, no nystagmus, EOM intact; conjunctiva and sclera clear.  ENT: No nasal discharge; airway clear.  NECK: Supple; non tender. + full passive ROM in all directions. No JVD  CARD: S1, S2 normal; no murmurs, gallops, or rubs. Regular rate and rhythm. + Symmetric Strong Pulses  RESP: No wheezes, rales or rhonchi. Good air movement bilaterally  ABD: soft; non-distended; non-tender. No Rebound, No Guarding, No signs of peritonitis, No CVA tenderness. No pulsatile abdominal mass. + Strong and Symmetric Pulses  EXT: Normal ROM. No clubbing, cyanosis or edema. Dp and Pt Pulses intact. Cap refill less than 3 seconds  NEURO:  no sensory or motor deficits, Alert, oriented, grossly unremarkable. No Focal deficits. GCS 15.   PSYCH: Cooperative, appropriate. denies si/hi    P: will assess for infectious colitis, pancreatitis, other intra-abd pathology with cbc, cmp, lipase, ua, CT abd/pelvis, will give ivf, consult SBIRT and reassess. 25F with PMH bipolar disorder, opiate abuse/IVDA, used heroin yesterday, eating disorder as a teenager who presents to [facility] for vomiting since Saturday. She reports 15lb unintentional weight loss in the past week. She endorses lower abdominal pain that radiates towards her back, intermittent, exacerbated with vomiting. Denies fevers, chills, urinary sx    CONSTITUTIONAL: Chronically ill appearing, pale female sitting in nad.   SKIN: skin exam is warm and dry, no acute rash.  HEAD: Normocephalic; atraumatic.  EYES: PERRL, 3 mm bilateral, no nystagmus, EOM intact; conjunctiva and sclera clear.  ENT: No nasal discharge; airway clear.  NECK: Supple; non tender. + full passive ROM in all directions. No JVD  CARD: S1, S2 normal; no murmurs, gallops, or rubs. Regular rate and rhythm. + Symmetric Strong Pulses  RESP: No wheezes, rales or rhonchi. Good air movement bilaterally  ABD: soft; non-distended; non-tender. No Rebound, No Guarding, No signs of peritonitis, No CVA tenderness. No pulsatile abdominal mass. + Strong and Symmetric Pulses  EXT: Normal ROM. No clubbing, cyanosis or edema. Dp and Pt Pulses intact. Cap refill less than 3 seconds  NEURO:  no sensory or motor deficits, Alert, oriented, grossly unremarkable. No Focal deficits. GCS 15.   PSYCH: Cooperative, appropriate. denies si/hi    P: will assess for infectious colitis, pancreatitis, other intra-abd pathology with cbc, cmp, lipase, ua, lactate, CT abd/pelvis, will give ivf, consult SBIRT and reassess. Pt poor f/u.

## 2020-09-23 NOTE — ED ADULT NURSE NOTE - PMH
Bipolar disorder    Cannabis dependence    Eating disorder  Previous history of bulimia nervosa  Heroin overdose    IV drug abuse    Methadone dependence    Nicotine dependence    Opiate abuse, continuous    Suicide attempt

## 2020-09-23 NOTE — H&P ADULT - ASSESSMENT
24yo female with PMH of IV drug use, polysubstance use disorder (heroin, marijuana, methadone, alprazolam), bipolar disorder with previous suicide attempt, and active smoker who presented to the ED complaining of anorexia and abdominal pain found to have bilateral pyelonephritis    # Abdominal pain and anorexia  - etiology bilateral pyelonephritis  Sepsis present on admission (WBC 17, , lactate 2 )  UA grossly positive with CT abd/pelvis showing bilateral pyelonephritis  - started hydration with LR at 100 cc/hr   - started rocephin   - f/u blood and urine cultures    #Polysubstance use disorder  Most recently used heroin yesterday and methadone today  -cont to monitor patient for signs of withdrawal (COWS)  -seen by CATCH team attending in previous admission and started on methadone 30 mg QD   will resume   -cont xanax 0.5mg TID PRN given pt takes high doses are home   -will need addiction medicine referral    #History of bipolar disorder with previous suicide attempt  - Denies suicidal ideation at this time  - Not currently on medication outpatient  - f/u psychiatry outpt    #Nicotine dependence  - nicotine patch while admitted     GI ppx: Po protonix 40 mg QD  DVT ppx: lovenox   Activity : ambulate as tolerated  Diet : DASH/TLC  Dispo: Home   Full Code 26yo female with PMH of IV drug use, polysubstance use disorder (heroin, marijuana, methadone, alprazolam), bipolar disorder with previous suicide attempt, and active smoker who presented to the ED complaining of anorexia and abdominal pain found to have bilateral pyelonephritis    # Abdominal pain and anorexia  - etiology bilateral pyelonephritis  Sepsis present on admission (WBC 17, , lactate 2 )  UA grossly positive with CT abd/pelvis showing bilateral pyelonephritis  - started hydration with LR at 100 cc/hr   - started rocephin   - f/u blood and urine cultures    #Polysubstance use disorder  Most recently used heroin yesterday and methadone today  -cont to monitor patient for signs of withdrawal (COWS)  -seen by CATCH team attending in previous admission and started on methadone 30 mg QD   however patient stating she is on 40 mg methadone  -methadone clinic closed, call please in am methadone clinic at [facility] to reinstate methadone.  -will need addiction medicine referral    #History of bipolar disorder with previous suicide attempt  - Denies suicidal ideation at this time  - Not currently on medication outpatient  - f/u psychiatry outpt    #Nicotine dependence  - if needed start nicoitne patch    GI ppx: Po protonix 40 mg QD  DVT ppx: lovenox   Activity : ambulate as tolerated  Diet : DASH/TLC  Dispo: Home   Full Code

## 2020-09-23 NOTE — H&P ADULT - NSHPSOCIALHISTORY_GEN_ALL_CORE
No Marital Status: single  Living Situation: lives with uncle  Occupation: unemployed  Tobacco Use: active smoker, smoked 0.5ppd for past 9 years  Alcohol Use: denies  Drug Use: IV heroin (uses 10 bags/day), marijuana, methadone Xanax   Sexual History: sexually active, currently in monogamous relationship, denies any history of sexually transmitted diseases  Functional Status: full functional in all ADLs and IADLs

## 2020-09-23 NOTE — H&P ADULT - NSHPPHYSICALEXAM_GEN_ALL_CORE
PHYSICAL EXAM:  GENERAL: No acute distress, well-developed  HEAD:  Atraumatic, Normocephalic  EYES: EOMI, PERRLA, conjunctiva and sclera clear  NECK: Supple, no lymphadenopathy, no JVD  CHEST/LUNG: CTAB; No wheezes, rales, or rhonchi  HEART: Regular rate and rhythm; No murmurs, rubs, or gallops  ABDOMEN: Soft, non-tender, non-distended; normal bowel sounds, no organomegaly  EXTREMITIES:  2+ peripheral pulses b/l, No clubbing, cyanosis, or edema  NEUROLOGY: A&O x 3, no focal deficits  SKIN: No rashes or lesions PHYSICAL EXAM:  GENERAL: No acute distress, well-developed  HEAD:  Atraumatic, Normocephalic  EYES: EOMI, PERRLA, conjunctiva and sclera clear  NECK: Supple, no lymphadenopathy, no JVD  CHEST/LUNG: CTAB; No wheezes, rales, or rhonchi  HEART: Regular rate and rhythm; no murmurs  ABDOMEN: Soft, diffuse tenderness  EXTREMITIES:  2+ peripheral pulses b/l, No clubbing, cyanosis, or edema  NEUROLOGY: A&O x 3, no focal deficits  SKIN: multiple track marks

## 2020-09-23 NOTE — H&P ADULT - NSICDXFAMILYHX_GEN_ALL_CORE_FT
Mr. Yusef Moreno   MRN: [id number]    Department:  [facility] Emergency Department   Date of Visit:  11/6/2018           Disclosure     Insurance plans vary and the physician(s) referred by the ER may not be covered by your plan.  Please conta CARE PHYSICIAN AT ONCE OR RETURN IMMEDIATELY TO THE EMERGENCY DEPARTMENT. If you have been prescribed any medication(s), please fill your prescription right away and begin taking the medication(s) as directed.   If you believe that any of the medications FAMILY HISTORY:  Family history of drug abuse, Mother uses heroin

## 2020-09-24 LAB
ALBUMIN SERPL ELPH-MCNC: 2.8 G/DL — LOW (ref 3.5–5.2)
ALP SERPL-CCNC: 205 U/L — HIGH (ref 30–115)
ALT FLD-CCNC: 27 U/L — SIGNIFICANT CHANGE UP (ref 0–41)
ANION GAP SERPL CALC-SCNC: 9 MMOL/L — SIGNIFICANT CHANGE UP (ref 7–14)
AST SERPL-CCNC: 21 U/L — SIGNIFICANT CHANGE UP (ref 0–41)
BASOPHILS # BLD AUTO: 0.02 K/UL — SIGNIFICANT CHANGE UP (ref 0–0.2)
BASOPHILS NFR BLD AUTO: 0.2 % — SIGNIFICANT CHANGE UP (ref 0–1)
BILIRUB SERPL-MCNC: 0.3 MG/DL — SIGNIFICANT CHANGE UP (ref 0.2–1.2)
BUN SERPL-MCNC: 3 MG/DL — LOW (ref 10–20)
CALCIUM SERPL-MCNC: 8.5 MG/DL — SIGNIFICANT CHANGE UP (ref 8.5–10.1)
CHLORIDE SERPL-SCNC: 99 MMOL/L — SIGNIFICANT CHANGE UP (ref 98–110)
CO2 SERPL-SCNC: 29 MMOL/L — SIGNIFICANT CHANGE UP (ref 17–32)
CREAT SERPL-MCNC: 0.6 MG/DL — LOW (ref 0.7–1.5)
EOSINOPHIL # BLD AUTO: 0.01 K/UL — SIGNIFICANT CHANGE UP (ref 0–0.7)
EOSINOPHIL NFR BLD AUTO: 0.1 % — SIGNIFICANT CHANGE UP (ref 0–8)
GLUCOSE SERPL-MCNC: 105 MG/DL — HIGH (ref 70–99)
HCT VFR BLD CALC: 29.9 % — LOW (ref 37–47)
HGB BLD-MCNC: 9.2 G/DL — LOW (ref 12–16)
IMM GRANULOCYTES NFR BLD AUTO: 0.5 % — HIGH (ref 0.1–0.3)
LACTATE SERPL-SCNC: 0.6 MMOL/L — LOW (ref 0.7–2)
LYMPHOCYTES # BLD AUTO: 1.22 K/UL — SIGNIFICANT CHANGE UP (ref 1.2–3.4)
LYMPHOCYTES # BLD AUTO: 10.4 % — LOW (ref 20.5–51.1)
MAGNESIUM SERPL-MCNC: 1.9 MG/DL — SIGNIFICANT CHANGE UP (ref 1.8–2.4)
MCHC RBC-ENTMCNC: 26.3 PG — LOW (ref 27–31)
MCHC RBC-ENTMCNC: 30.8 G/DL — LOW (ref 32–37)
MCV RBC AUTO: 85.4 FL — SIGNIFICANT CHANGE UP (ref 81–99)
MONOCYTES # BLD AUTO: 0.82 K/UL — HIGH (ref 0.1–0.6)
MONOCYTES NFR BLD AUTO: 7 % — SIGNIFICANT CHANGE UP (ref 1.7–9.3)
NEUTROPHILS # BLD AUTO: 9.62 K/UL — HIGH (ref 1.4–6.5)
NEUTROPHILS NFR BLD AUTO: 81.8 % — HIGH (ref 42.2–75.2)
NRBC # BLD: 0 /100 WBCS — SIGNIFICANT CHANGE UP (ref 0–0)
PHOSPHATE SERPL-MCNC: 3.7 MG/DL — SIGNIFICANT CHANGE UP (ref 2.1–4.9)
PLATELET # BLD AUTO: 335 K/UL — SIGNIFICANT CHANGE UP (ref 130–400)
POTASSIUM SERPL-MCNC: 4.1 MMOL/L — SIGNIFICANT CHANGE UP (ref 3.5–5)
POTASSIUM SERPL-SCNC: 4.1 MMOL/L — SIGNIFICANT CHANGE UP (ref 3.5–5)
PROT SERPL-MCNC: 6 G/DL — SIGNIFICANT CHANGE UP (ref 6–8)
RBC # BLD: 3.5 M/UL — LOW (ref 4.2–5.4)
RBC # FLD: 13.5 % — SIGNIFICANT CHANGE UP (ref 11.5–14.5)
SARS-COV-2 RNA SPEC QL NAA+PROBE: SIGNIFICANT CHANGE UP
SODIUM SERPL-SCNC: 137 MMOL/L — SIGNIFICANT CHANGE UP (ref 135–146)
WBC # BLD: 11.75 K/UL — HIGH (ref 4.8–10.8)
WBC # FLD AUTO: 11.75 K/UL — HIGH (ref 4.8–10.8)

## 2020-09-24 PROCEDURE — 99406 BEHAV CHNG SMOKING 3-10 MIN: CPT

## 2020-09-24 PROCEDURE — 99233 SBSQ HOSP IP/OBS HIGH 50: CPT | Mod: 25

## 2020-09-24 RX ORDER — INFLUENZA VIRUS VACCINE 15; 15; 15; 15 UG/.5ML; UG/.5ML; UG/.5ML; UG/.5ML
0.5 SUSPENSION INTRAMUSCULAR ONCE
Refills: 0 | Status: COMPLETED | OUTPATIENT
Start: 2020-09-24 | End: 2020-09-25

## 2020-09-24 RX ORDER — ACETAMINOPHEN 500 MG
650 TABLET ORAL EVERY 6 HOURS
Refills: 0 | Status: DISCONTINUED | OUTPATIENT
Start: 2020-09-24 | End: 2020-09-25

## 2020-09-24 RX ORDER — KETOROLAC TROMETHAMINE 30 MG/ML
15 SYRINGE (ML) INJECTION ONCE
Refills: 0 | Status: DISCONTINUED | OUTPATIENT
Start: 2020-09-24 | End: 2020-09-24

## 2020-09-24 RX ORDER — METHADONE HYDROCHLORIDE 40 MG/1
40 TABLET ORAL DAILY
Refills: 0 | Status: DISCONTINUED | OUTPATIENT
Start: 2020-09-24 | End: 2020-09-25

## 2020-09-24 RX ORDER — ONDANSETRON 8 MG/1
4 TABLET, FILM COATED ORAL ONCE
Refills: 0 | Status: COMPLETED | OUTPATIENT
Start: 2020-09-24 | End: 2020-09-24

## 2020-09-24 RX ADMIN — Medication 650 MILLIGRAM(S): at 10:35

## 2020-09-24 RX ADMIN — ONDANSETRON 4 MILLIGRAM(S): 8 TABLET, FILM COATED ORAL at 03:01

## 2020-09-24 RX ADMIN — METHADONE HYDROCHLORIDE 40 MILLIGRAM(S): 40 TABLET ORAL at 13:56

## 2020-09-24 RX ADMIN — Medication 1 TABLET(S): at 11:14

## 2020-09-24 RX ADMIN — ENOXAPARIN SODIUM 40 MILLIGRAM(S): 100 INJECTION SUBCUTANEOUS at 11:14

## 2020-09-24 RX ADMIN — ONDANSETRON 4 MILLIGRAM(S): 8 TABLET, FILM COATED ORAL at 13:57

## 2020-09-24 RX ADMIN — Medication 650 MILLIGRAM(S): at 14:33

## 2020-09-24 RX ADMIN — Medication 15 MILLIGRAM(S): at 11:50

## 2020-09-24 RX ADMIN — SODIUM CHLORIDE 75 MILLILITER(S): 9 INJECTION, SOLUTION INTRAVENOUS at 11:15

## 2020-09-24 RX ADMIN — SODIUM CHLORIDE 75 MILLILITER(S): 9 INJECTION, SOLUTION INTRAVENOUS at 06:42

## 2020-09-24 RX ADMIN — PANTOPRAZOLE SODIUM 40 MILLIGRAM(S): 20 TABLET, DELAYED RELEASE ORAL at 05:22

## 2020-09-24 RX ADMIN — Medication 15 MILLIGRAM(S): at 13:38

## 2020-09-24 RX ADMIN — CEFTRIAXONE 100 MILLIGRAM(S): 500 INJECTION, POWDER, FOR SOLUTION INTRAMUSCULAR; INTRAVENOUS at 17:05

## 2020-09-24 NOTE — PROGRESS NOTE ADULT - SUBJECTIVE AND OBJECTIVE BOX
LENGTH OF HOSPITAL STAY: 1d      CHIEF COMPLAINT:   Patient is a 25y old  Female who presents with a chief complaint of anorexia and  abdominal pain (24 Sep 2020 11:52)      OVER Past 24hrs:  The patient was seen and examined at bedside there were no acute events   during the night.  complains on left flank pain.        PAST MEDICAL & SURGICAL HISTORY  PAST MEDICAL & SURGICAL HISTORY:  Bipolar disorder    Suicide attempt    Heroin overdose    Nicotine dependence    Cannabis dependence    Methadone dependence    Opiate abuse, continuous    Eating disorder  Previous history of bulimia nervosa    IV drug abuse    No significant past surgical history          REVIEW OF SYSTEMS  CONSTITUTIONAL: No fever, weight loss, or fatigue  EYES: No eye pain, visual disturbances, or discharge  ENMT:  No difficulty hearing, tinnitus, vertigo; No sinus or throat pain  NECK: No pain or stiffness  RESPIRATORY: No cough, wheezing, chills or hemoptysis; No shortness of breath  CARDIOVASCULAR: No chest pain, palpitations, dizziness, or leg swelling  GASTROINTESTINAL: No abdominal or epigastric pain. No nausea, vomiting, or hematemesis; No diarrhea or constipation. No melena or hematochezia.  GENITOURINARY: No dysuria, frequency, hematuria, or incontinence  NEUROLOGICAL: No headaches, memory loss, loss of strength, numbness, or tremors  SKIN: No itching, burning, rashes, or lesions   LYMPH NODES: No enlarged glands  ENDOCRINE: No heat or cold intolerance; No hair loss  MUSCULOSKELETAL:  Back pain    PSYCHIATRIC: No depression, anxiety, mood swings, or difficulty sleeping      ALLERGIES:  fish (Hives)  pentazocine (Other)  phenobarbital (Rash)    MEDICATIONS:  STANDING MEDICATIONS  cefTRIAXone   IVPB 1000 milliGRAM(s) IV Intermittent every 24 hours  chlorhexidine 4% Liquid 1 Application(s) Topical <User Schedule>  enoxaparin Injectable 40 milliGRAM(s) SubCutaneous daily  influenza   Vaccine 0.5 milliLiter(s) IntraMuscular once  lactated ringers. 1000 milliLiter(s) IV Continuous <Continuous>  multivitamin 1 Tablet(s) Oral daily  pantoprazole    Tablet 40 milliGRAM(s) Oral before breakfast    PRN MEDICATIONS  acetaminophen   Tablet .. 650 milliGRAM(s) Oral every 6 hours PRN    VITALS:   T(F): 98.5  HR: 54  BP: 97/52  RR: 18  SpO2: 100%    PHYSICAL EXAM:  General: No acute distress.  Alert, oriented, interactive, nonfocal.    HEENT: Pupils equal, reactive to light symmetrically.    PULM: Clear to auscultation bilaterally, no significant sputum production.    CVS: Regular rate and rhythm, no murmurs, rubs, or gallops.    GI: Soft, nondistended, nontender, no masses.    MSK: No edema, nontender.  left  flank tenderness,  paraspinal tenderness     SKIN: Warm and well perfused, no rashes noted.    PSYCH: AAOx3     NEURO: NOn focal   LABS:                        9.2    11.75 )-----------( 335      ( 24 Sep 2020 06:51 )             29.9         137  |  99  |  3<L>  ----------------------------<  105<H>  4.1   |  29  |  0.6<L>    Ca    8.5      24 Sep 2020 06:51  Phos  3.7       Mg     1.9         TPro  6.0  /  Alb  2.8<L>  /  TBili  0.3  /  DBili  x   /  AST  21  /  ALT  27  /  AlkPhos  205<H>        Urinalysis Basic - ( 23 Sep 2020 13:27 )    Color: Yellow / Appearance: Slightly Turbid / S.009 / pH: x  Gluc: x / Ketone: Small  / Bili: Negative / Urobili: 3 mg/dL   Blood: x / Protein: 30 mg/dL / Nitrite: Negative   Leuk Esterase: Large / RBC: 8 /HPF /  /HPF   Sq Epi: x / Non Sq Epi: 0 /HPF / Bacteria: Many        Lactate, Blood: 0.6 mmol/L <L> (20 @ 06:51)  Lactate, Blood: 0.7 mmol/L (20 @ 15:38)          RADIOLOGY:    < from: CT Abdomen and Pelvis w/ IV Cont (20 @ 16:08) >    TECHNIQUE: Contiguous axial CT images were obtained from the lower chest to the pubic symphysis following administration of 100cc Optiray 320 intravenous contrast.  Oral contrast was not administered.  Reformatted images in the coronal and sagittal planes were acquired.    Comparison: None.    FINDINGS:    LOWER CHEST: Unremarkable.    HEPATOBILIARY: Unremarkable.    SPLEEN: Unremarkable.    PANCREAS: Unremarkable.    ADRENAL GLANDS: Unremarkable.    KIDNEYS: Multifocal bilateral renal cortical hypoenhancement. Right perinephric stranding. Right renal 5 cm cyst. No hydronephrosis. Increased bilateral renal pelvis urothelial enhancement.    ABDOMINOPELVIC NODES: Unremarkable.    PELVIC ORGANS: Unremarkable.    PERITONEUM/MESENTERY/BOWEL: Unremarkable.    BONES/SOFT TISSUES: Unremarkable.      IMPRESSION:  Findings consistent with bilateral pyelonephritis.      < end of copied text >                    
Patient is a 25y old  Female who presents with a chief complaint of anorexia and  abdominal pain (23 Sep 2020 16:43)    Patient was seen and examined.  C/o left flank pain  C/o decrease PO intake  Denies any other complaints.  All systems reviewed positive history as mentioned above.    PAST MEDICAL & SURGICAL HISTORY:  Bipolar disorder  Suicide attempt  Heroin overdose  Nicotine dependence  Cannabis dependence  Methadone dependence  Opiate abuse, continuous  Eating disorder  Previous history of bulimia nervosa  IV drug abuse    No significant past surgical history    Allergies  fish (Hives)  pentazocine (Other)  phenobarbital (Rash)    MEDICATIONS  (STANDING):  cefTRIAXone   IVPB 1000 milliGRAM(s) IV Intermittent every 24 hours  chlorhexidine 4% Liquid 1 Application(s) Topical <User Schedule>  enoxaparin Injectable 40 milliGRAM(s) SubCutaneous daily  influenza   Vaccine 0.5 milliLiter(s) IntraMuscular once  lactated ringers. 1000 milliLiter(s) (75 mL/Hr) IV Continuous <Continuous>  multivitamin 1 Tablet(s) Oral daily  pantoprazole    Tablet 40 milliGRAM(s) Oral before breakfast    MEDICATIONS  (PRN):  acetaminophen   Tablet .. 650 milliGRAM(s) Oral every 6 hours PRN Mild Pain (1 - 3), Moderate Pain (4 - 6)    Vital Signs Last 24 Hrs  T(C): 37.2  T(F): 99  HR: 80  BP: 102/67  BP(mean): --  RR: 18  SpO2: 100%    O/E:  Awake, alert, not in distress.  HEENT: atraumatic, EOMI.  Chest: clear.  CVS: SIS2 +, no murmur.  P/A: Soft, BS+, left CVA tenderness  CNS: non focal.  Ext: no edema feet.  Skin: no rash, no ulcers.  All systems reviewed positive findings as above.                          9.2<L>  11.75<H> )-----------( 335      ( 24 Sep 2020 06:51 )             29.9<L>                        11.0<L>  17.28<H> )-----------( 442<H>    ( 23 Sep 2020 11:35 )             35.6<L>    09-24    137  |  99  |  3<L>  ----------------------------<  105<H>  4.1   |  29  |  0.6<L>      133<L>  |  92<L>  |  7<L>  ----------------------------<  116<H>  4.3   |  29  |  0.8    Ca    8.5      24 Sep 2020 06:51  Ca    9.1      23 Sep 2020 11:35  Phos  3.7       Mg     1.9         TPro  6.0  /  Alb  2.8<L>  /  TBili  0.3  /  DBili  x   /  AST  21  /  ALT  27  /  AlkPhos  205<H>    TPro  7.6  /  Alb  3.7  /  TBili  0.7  /  DBili  0.3<H>  /  AST  29  /  ALT  41  /  AlkPhos  287<H>              Urinalysis Basic - ( 23 Sep 2020 13:27 )    Color: Yellow / Appearance: Slightly Turbid / S.009 / pH: x  Gluc: x / Ketone: Small  / Bili: Negative / Urobili: 3 mg/dL   Blood: x / Protein: 30 mg/dL / Nitrite: Negative   Leuk Esterase: Large / RBC: 8 /HPF /  /HPF   Sq Epi: x / Non Sq Epi: 0 /HPF / Bacteria: Many

## 2020-09-24 NOTE — PROGRESS NOTE ADULT - ASSESSMENT
26yo female with PMH of IV drug use, polysubstance use disorder (heroin, marijuana, methadone, alprazolam), bipolar disorder with previous suicide attempt, and active smoker who presented to the ED complaining of anorexia and abdominal pain found to have bilateral pyelonephritis    #  Sepsis POA sec to Pyelonephritis-resolved  - etiology bilateral pyelonephritis  Sepsis present on admission (WBC 17, , lactate 2 )  UA grossly positive with CT abd/pelvis showing bilateral pyelonephritis  - continue Rocephin   - f/u blood and urine cultures    #Polysubstance use disorder  [place] [address], [phone].   IVDA: currently on methadone  Patient counselled on the importance of sobriety  Continue methadone dosage (40mg)confirmed  @ 19/24/2020   last dose 09/23  monitor for signs of withdrawal   continue with xanax prn  f/u addiction medicine     #History of bipolar disorder with previous suicide attempt  - Denies suicidal ideation at this time  - Not currently on medication outpatient  - f/u psychiatry outpt    HO Nicotine dependence  - if needed start nicoitne patch    GI ppx: Po protonix 40 mg QD  DVT ppx: lovenox   Activity : ambulate as tolerated  Diet : DASH/TLC  Dispo: Home   Full Code     
25 year old female with hx of IV drug use, polysubstance use disorder (heroin, marijuana, methadone, alprazolam), bipolar disorder with previous suicide attempt, active smoker presenting for abdominal pain. Describes abdominal pain in Lower abdomen associated with nausea and vomit. Pain radiates to the back. She states she has anorexia and has not been able to eat. She last took heroin yesterday and got methadone in am.     # Sepsis POA sec to Pyelonephritis-resolved  # Acute Pyelonephritis  -  CT Abdomen and Pelvis w/ IV Cont (09.23.20 @ 16:08) >Findings consistent with bilateral pyelonephritis.  - F/u Blood culture  - F/u  urine culture  - C/w IV fluids  - c/w IV ceftriaxone    # Gastritis  - c/w Protonix    # H/o polysubstance abuse- Heroin, marijuana, methadone, alprazolam  -  [place] [address], [phone]. She reported that she has an appt tomorrow and plans to attend this appt  - is not interested in attending inpatient rehab at this time  - pt counseled    #  Nicotine abuse  - Pt counseled    # H/o Bipolar disorder, H/o previos suicide  attempts  - Denies being depressed at present. Denies any Suicidal intent, thoughts    # DVT prophylaxis    # Full code    # Pending: F/u blood cultures, urine cultures  # Discussed plan of care with patient  # Disposition: Home when stable

## 2020-09-25 ENCOUNTER — TRANSCRIPTION ENCOUNTER (OUTPATIENT)
Age: 25
End: 2020-09-25

## 2020-09-25 VITALS
SYSTOLIC BLOOD PRESSURE: 106 MMHG | RESPIRATION RATE: 18 BRPM | TEMPERATURE: 98 F | DIASTOLIC BLOOD PRESSURE: 72 MMHG | HEART RATE: 68 BPM

## 2020-09-25 LAB
ALBUMIN SERPL ELPH-MCNC: 2.7 G/DL — LOW (ref 3.5–5.2)
ALP SERPL-CCNC: 206 U/L — HIGH (ref 30–115)
ALT FLD-CCNC: 24 U/L — SIGNIFICANT CHANGE UP (ref 0–41)
AMPHET UR-MCNC: NEGATIVE — SIGNIFICANT CHANGE UP
ANION GAP SERPL CALC-SCNC: 8 MMOL/L — SIGNIFICANT CHANGE UP (ref 7–14)
AST SERPL-CCNC: 17 U/L — SIGNIFICANT CHANGE UP (ref 0–41)
BARBITURATES UR SCN-MCNC: NEGATIVE — SIGNIFICANT CHANGE UP
BASOPHILS # BLD AUTO: 0.02 K/UL — SIGNIFICANT CHANGE UP (ref 0–0.2)
BASOPHILS NFR BLD AUTO: 0.2 % — SIGNIFICANT CHANGE UP (ref 0–1)
BENZODIAZ UR-MCNC: NEGATIVE — SIGNIFICANT CHANGE UP
BILIRUB SERPL-MCNC: 0.3 MG/DL — SIGNIFICANT CHANGE UP (ref 0.2–1.2)
BUN SERPL-MCNC: 4 MG/DL — LOW (ref 10–20)
CALCIUM SERPL-MCNC: 8.3 MG/DL — LOW (ref 8.5–10.1)
CHLORIDE SERPL-SCNC: 99 MMOL/L — SIGNIFICANT CHANGE UP (ref 98–110)
CO2 SERPL-SCNC: 31 MMOL/L — SIGNIFICANT CHANGE UP (ref 17–32)
COCAINE METAB.OTHER UR-MCNC: NEGATIVE — SIGNIFICANT CHANGE UP
CREAT SERPL-MCNC: 0.6 MG/DL — LOW (ref 0.7–1.5)
DRUG SCREEN 1, URINE RESULT: SIGNIFICANT CHANGE UP
EOSINOPHIL # BLD AUTO: 0.12 K/UL — SIGNIFICANT CHANGE UP (ref 0–0.7)
EOSINOPHIL NFR BLD AUTO: 1.4 % — SIGNIFICANT CHANGE UP (ref 0–8)
GLUCOSE SERPL-MCNC: 90 MG/DL — SIGNIFICANT CHANGE UP (ref 70–99)
HCT VFR BLD CALC: 31.1 % — LOW (ref 37–47)
HGB BLD-MCNC: 9.7 G/DL — LOW (ref 12–16)
IMM GRANULOCYTES NFR BLD AUTO: 0.5 % — HIGH (ref 0.1–0.3)
LYMPHOCYTES # BLD AUTO: 1.61 K/UL — SIGNIFICANT CHANGE UP (ref 1.2–3.4)
LYMPHOCYTES # BLD AUTO: 19.1 % — LOW (ref 20.5–51.1)
MAGNESIUM SERPL-MCNC: 1.9 MG/DL — SIGNIFICANT CHANGE UP (ref 1.8–2.4)
MCHC RBC-ENTMCNC: 26.6 PG — LOW (ref 27–31)
MCHC RBC-ENTMCNC: 31.2 G/DL — LOW (ref 32–37)
MCV RBC AUTO: 85.4 FL — SIGNIFICANT CHANGE UP (ref 81–99)
METHADONE UR-MCNC: POSITIVE
MONOCYTES # BLD AUTO: 0.69 K/UL — HIGH (ref 0.1–0.6)
MONOCYTES NFR BLD AUTO: 8.2 % — SIGNIFICANT CHANGE UP (ref 1.7–9.3)
NEUTROPHILS # BLD AUTO: 5.93 K/UL — SIGNIFICANT CHANGE UP (ref 1.4–6.5)
NEUTROPHILS NFR BLD AUTO: 70.6 % — SIGNIFICANT CHANGE UP (ref 42.2–75.2)
NRBC # BLD: 0 /100 WBCS — SIGNIFICANT CHANGE UP (ref 0–0)
OPIATES UR-MCNC: NEGATIVE — SIGNIFICANT CHANGE UP
PCP UR-MCNC: NEGATIVE — SIGNIFICANT CHANGE UP
PLATELET # BLD AUTO: 345 K/UL — SIGNIFICANT CHANGE UP (ref 130–400)
POTASSIUM SERPL-MCNC: 4 MMOL/L — SIGNIFICANT CHANGE UP (ref 3.5–5)
POTASSIUM SERPL-SCNC: 4 MMOL/L — SIGNIFICANT CHANGE UP (ref 3.5–5)
PROPOXYPHENE QUALITATIVE URINE RESULT: NEGATIVE — SIGNIFICANT CHANGE UP
PROT SERPL-MCNC: 5.7 G/DL — LOW (ref 6–8)
RBC # BLD: 3.64 M/UL — LOW (ref 4.2–5.4)
RBC # FLD: 13.4 % — SIGNIFICANT CHANGE UP (ref 11.5–14.5)
SODIUM SERPL-SCNC: 138 MMOL/L — SIGNIFICANT CHANGE UP (ref 135–146)
THC UR QL: NEGATIVE — SIGNIFICANT CHANGE UP
WBC # BLD: 8.41 K/UL — SIGNIFICANT CHANGE UP (ref 4.8–10.8)
WBC # FLD AUTO: 8.41 K/UL — SIGNIFICANT CHANGE UP (ref 4.8–10.8)

## 2020-09-25 PROCEDURE — 99251: CPT

## 2020-09-25 PROCEDURE — 99239 HOSP IP/OBS DSCHRG MGMT >30: CPT

## 2020-09-25 RX ORDER — CEFPODOXIME PROXETIL 100 MG
1 TABLET ORAL
Qty: 16 | Refills: 0
Start: 2020-09-25 | End: 2020-10-02

## 2020-09-25 RX ORDER — KETOROLAC TROMETHAMINE 30 MG/ML
15 SYRINGE (ML) INJECTION EVERY 8 HOURS
Refills: 0 | Status: DISCONTINUED | OUTPATIENT
Start: 2020-09-25 | End: 2020-09-25

## 2020-09-25 RX ADMIN — Medication 15 MILLIGRAM(S): at 04:23

## 2020-09-25 RX ADMIN — Medication 15 MILLIGRAM(S): at 04:46

## 2020-09-25 RX ADMIN — ENOXAPARIN SODIUM 40 MILLIGRAM(S): 100 INJECTION SUBCUTANEOUS at 11:39

## 2020-09-25 RX ADMIN — METHADONE HYDROCHLORIDE 40 MILLIGRAM(S): 40 TABLET ORAL at 11:41

## 2020-09-25 RX ADMIN — CHLORHEXIDINE GLUCONATE 1 APPLICATION(S): 213 SOLUTION TOPICAL at 05:54

## 2020-09-25 RX ADMIN — Medication 1 TABLET(S): at 11:39

## 2020-09-25 RX ADMIN — SODIUM CHLORIDE 75 MILLILITER(S): 9 INJECTION, SOLUTION INTRAVENOUS at 09:41

## 2020-09-25 RX ADMIN — INFLUENZA VIRUS VACCINE 0.5 MILLILITER(S): 15; 15; 15; 15 SUSPENSION INTRAMUSCULAR at 14:30

## 2020-09-25 RX ADMIN — PANTOPRAZOLE SODIUM 40 MILLIGRAM(S): 20 TABLET, DELAYED RELEASE ORAL at 05:56

## 2020-09-25 NOTE — CHART NOTE - NSCHARTNOTEFT_GEN_A_CORE
<<<RESIDENT DISCHARGE NOTE>>>     JOSÉ MANUEL JI  MRN-[id number]    VITAL SIGNS:  T(F): 97.5 (09-25-20 @ 12:11), Max: 98 (09-25-20 @ 05:45)  HR: 68 (09-25-20 @ 12:11)  BP: 106/72 (09-25-20 @ 12:11)  SpO2: --      PHYSICAL EXAMINATION:  General: NAD  Head & Neck: NCAT  Pulmonary: CTA b/l  Cardiovascular: +s1s2 RRR  Gastrointestinal/Abdomen & Pelvis: soft, NT/ND (+) bs  Neurologic/Motor: FROM x 4 5/5    TEST RESULTS:                        9.7    8.41  )-----------( 345      ( 25 Sep 2020 06:47 )             31.1       09-25    138  |  99  |  4<L>  ----------------------------<  90  4.0   |  31  |  0.6<L>    Ca    8.3<L>      25 Sep 2020 06:47  Phos  3.7     09-24  Mg     1.9     09-25    TPro  5.7<L>  /  Alb  2.7<L>  /  TBili  0.3  /  DBili  x   /  AST  17  /  ALT  24  /  AlkPhos  206<H>  09-25      FINAL DISCHARGE INTERVIEW:  Resident(s) Present: (Name: Dr. Garcia), RN Present: (Name:)    DISCHARGE MEDICATION RECONCILIATION  reviewed with Attending (Dr. Connolly)    DISPOSITION:   [x] Home,    [  ] Home with Visiting Nursing Services,   [    ]  SNF/ NH,    [   ] Acute Rehab (4A),   [   ] Other (Specify:)

## 2020-09-25 NOTE — DISCHARGE NOTE PROVIDER - NSDCCPCAREPLAN_GEN_ALL_CORE_FT
PRINCIPAL DISCHARGE DIAGNOSIS  Diagnosis: Pyelonephritis  Assessment and Plan of Treatment: A urinary tract infection (UTI) is caused by bacteria that get inside your urinary tract. Most bacteria that enter your urinary tract come out when you urinate. If the bacteria stay in your urinary tract, you may get an infection. Your urinary tract includes your kidneys, ureters, bladder, and urethra. Urine is made in your kidneys, and it flows from the ureters to the bladder. Urine leaves the bladder through the urethra. A UTI is more common in your lower urinary tract, which includes your bladder and urethra.In your case, the bacteria traveled to your kidney and causes the lower back and abdomen pain you experienced  To prevent another UTI, you can do the following:  Empty your bladder often.   Wipe from front to back after you urinate or have a bowel movement. This will help prevent germs from getting into your urinary tract.  Drink water, but do not drink alcohol, caffeine, or citrus juices. These can irritate your bladder and increase your symptoms.  Urinate after you have intercourse. This can help flush out bacteria passed during sex.  Do not douche or use feminine deodorants. These can change the chemical balance in your vagina.  Do pelvic muscle exercises often. Pelvic muscle exercises may help you start and stop urinating. Strong pelvic muscles may help you empty your bladder easier. Squeeze these muscles tightly for 5 seconds like you are trying to hold back urine. Then relax for 5 seconds. Gradually work up to squeezing for 10 seconds. Do 3 sets of 15 repetitions a day  Seek care immediately if:  •You are urinating very little or not at all.  •You have a high fever with shaking chills.  •You have side or back pain that gets worse.        SECONDARY DISCHARGE DIAGNOSES  Diagnosis: Polysubstance abuse  Assessment and Plan of Treatment: Please continue to go to the methadone maintance treatment program at [facility] on [address] every day.

## 2020-09-25 NOTE — DISCHARGE NOTE NURSING/CASE MANAGEMENT/SOCIAL WORK - NSDCFUADDAPPT_GEN_ALL_CORE_FT
[facility] - appointment to see a primary doctor on 10/6/2020 at 1:30 pm  [address], [place], [id number] [phone]

## 2020-09-25 NOTE — CONSULT NOTE ADULT - ASSESSMENT
Pt is a 25 yr old female with a 10 yr h/o polysubstance use disorder who is on MMTP but still using Fentanyl mostly sniffing not IV all the time. Pt is known to me from her last admission for r/o endocarditis/opiate withdrawal. Pt went back to her program for Methadone after that admission however has been missing doses which precludes a dose increase. Pt still using heroin however she has decreased the amount from 30 to 10 bags a day because the 40mg is not sufficient. Pt was admitted for b/l pyelonephritis. ROS-no nausea/vomiting no anxiety no diaphoresis + right sided flank pain-mostly at night no dysuria +abdominal pain resolving PE-Pt is A&O in NAD resting comfortably in bed VSS 99/72/75/98/14 Skin-track marks b/l dorsum hands Labs-CT Abd-findings consistent with pyelo-b.l UA+large leuks mod blood many bacteria WBC 17.28 #! 11.75 #2 A/P- Pt with a h/o OUD on MTD 40 mg admitted to hospital for b/l pyelonephritis. Pt is considering a rehab referral but if she returns to her Clinic she most likely can get a dose increase to avoid using Fentanyl. Catch Team will follow up. Continue present medical regimen and non-opiate pain meds-pt on Tylenol/Toradol.

## 2020-09-25 NOTE — DISCHARGE NOTE PROVIDER - NSDCFUADDAPPT_GEN_ALL_CORE_FT
Please call the [facility] clinic at [phone] to make an appointment to see a primary doctor Please call the [facility] clinic at [phone] to make an appointment to see a primary doctor [facility] - appointment to see a primary doctor on 10/6/2020 at 1:30 pm  [address], [place], [id number] [phone]

## 2020-09-25 NOTE — DISCHARGE NOTE NURSING/CASE MANAGEMENT/SOCIAL WORK - PATIENT PORTAL LINK FT
You can access the FollowMyHealth Patient Portal offered by [facility] by registering at the following website: [email or web address]. By joining [employer]’s FollowMyHealth portal, you will also be able to view your health information using other applications (apps) compatible with our system.

## 2020-09-25 NOTE — DISCHARGE NOTE PROVIDER - HOSPITAL COURSE
24yo female with PMH of IV drug use, polysubstance use disorder (heroin, marijuana, methadone, alprazolam), bipolar disorder with previous suicide attempt, and active smoker who presented to the ED complaining of anorexia and abdominal pain found to have bilateral pyelonephritis    # Sepsis POA sec to Pyelonephritis-resolved  - Presented with sepsis on admission (WBC 17, , lactate 2 )  - UA grossly positive with CT abd/pelvis showing bilateral pyelonephritis  - Started on Rocephin, will d/c on Vantin BID   - f/u final blood and urine culture result    #Polysubstance use disorder  - [place] [address], [phone].   - IVDA: currently on methadone   - Patient counselled on the importance of sobriety  - Continue methadone dosage (40mg)confirmed  @ 19/24/2020   last dose 09/23  - monitor for signs of withdrawal   - f/u addiction medicine     #History of bipolar disorder with previous suicide attempt  - Denies suicidal ideation at this time  - Not currently on medication outpatient  - f/u psychiatry outpt    HO Nicotine dependence  - if needed start nicoitne patch     24yo female with PMH of IV drug use, polysubstance use disorder (heroin, marijuana, methadone, alprazolam), bipolar disorder with previous suicide attempt, and active smoker who presented to the ED complaining of anorexia and abdominal pain found to have bilateral pyelonephritis    # Sepsis POA sec to Pyelonephritis-resolved  - Presented with sepsis on admission (WBC 17, , lactate 2 )  - UA grossly positive with CT abd/pelvis showing bilateral pyelonephritis  - Started on Rocephin, will d/c on Vantin BID   - blood cultures neg  - F/u urine cultures    #Polysubstance use disorder  - she is currently in treatment at [address] [address], [phone].   - IVDA: currently on methadone   - Patient counselled on the importance of sobriety  - on methadone dosage (40mg)confirmed  @ 19/24/2020   last dose 09/23  - addiction med eval : Recommend Methadone 30mg daily.    #History of bipolar disorder with previous suicide attempt  - Denies suicidal ideation at this time  - Not currently on medication outpatient  - f/u psychiatry outpt    # HO Nicotine dependence  - pt counseled

## 2020-09-25 NOTE — DISCHARGE NOTE NURSING/CASE MANAGEMENT/SOCIAL WORK - NSDCPEFALRISK_GEN_ALL_CORE
Patient information on fall and injury prevention
69 yr old male with chronic systolic CHF, hypertension, insomnia, chronic back pain admitted with complaints of suspected AICD shocks. Device was interrogated, which revealed no events. Troponin negative. Cardiology was consulted, advised continue home medications and outpatient follow up. He is stable for discharge home.    Spent > 35 mins in discharge plan and documentation.

## 2020-09-26 LAB
CULTURE RESULTS: NO GROWTH — SIGNIFICANT CHANGE UP
SPECIMEN SOURCE: SIGNIFICANT CHANGE UP

## 2020-09-28 LAB
CULTURE RESULTS: SIGNIFICANT CHANGE UP
CULTURE RESULTS: SIGNIFICANT CHANGE UP
SPECIMEN SOURCE: SIGNIFICANT CHANGE UP
SPECIMEN SOURCE: SIGNIFICANT CHANGE UP

## 2020-09-29 DIAGNOSIS — R10.9 UNSPECIFIED ABDOMINAL PAIN: ICD-10-CM

## 2020-09-29 DIAGNOSIS — K29.70 GASTRITIS, UNSPECIFIED, WITHOUT BLEEDING: ICD-10-CM

## 2020-09-29 DIAGNOSIS — N12 TUBULO-INTERSTITIAL NEPHRITIS, NOT SPECIFIED AS ACUTE OR CHRONIC: ICD-10-CM

## 2020-09-29 DIAGNOSIS — R63.0 ANOREXIA: ICD-10-CM

## 2020-09-29 DIAGNOSIS — F13.10 SEDATIVE, HYPNOTIC OR ANXIOLYTIC ABUSE, UNCOMPLICATED: ICD-10-CM

## 2020-09-29 DIAGNOSIS — F17.200 NICOTINE DEPENDENCE, UNSPECIFIED, UNCOMPLICATED: ICD-10-CM

## 2020-09-29 DIAGNOSIS — Z86.59 PERSONAL HISTORY OF OTHER MENTAL AND BEHAVIORAL DISORDERS: ICD-10-CM

## 2020-09-29 DIAGNOSIS — R63.8 OTHER SYMPTOMS AND SIGNS CONCERNING FOOD AND FLUID INTAKE: ICD-10-CM

## 2020-09-29 DIAGNOSIS — F12.10 CANNABIS ABUSE, UNCOMPLICATED: ICD-10-CM

## 2020-09-29 DIAGNOSIS — Z91.5 PERSONAL HISTORY OF SELF-HARM: ICD-10-CM

## 2020-09-29 DIAGNOSIS — A41.9 SEPSIS, UNSPECIFIED ORGANISM: ICD-10-CM

## 2020-09-29 DIAGNOSIS — F11.20 OPIOID DEPENDENCE, UNCOMPLICATED: ICD-10-CM

## 2020-09-30 ENCOUNTER — OUTPATIENT (OUTPATIENT)
Dept: OUTPATIENT SERVICES | Facility: HOSPITAL | Age: 25
LOS: 1 days | Discharge: HOME | End: 2020-09-30

## 2020-09-30 DIAGNOSIS — I49.9 CARDIAC ARRHYTHMIA, UNSPECIFIED: ICD-10-CM

## 2020-09-30 LAB
EDDP UR QL CFM: SIGNIFICANT CHANGE UP NG/ML
EDDP, UR RESULT: SIGNIFICANT CHANGE UP NG/ML
METHADONE IN-HOUSE INTERPRETATION: POSITIVE
METHADONE UR CFM-MCNC: POSITIVE

## 2021-07-12 ENCOUNTER — EMERGENCY (EMERGENCY)
Facility: HOSPITAL | Age: 26
LOS: 0 days | Discharge: HOME | End: 2021-07-12
Attending: STUDENT IN AN ORGANIZED HEALTH CARE EDUCATION/TRAINING PROGRAM | Admitting: STUDENT IN AN ORGANIZED HEALTH CARE EDUCATION/TRAINING PROGRAM
Payer: COMMERCIAL

## 2021-07-12 VITALS
DIASTOLIC BLOOD PRESSURE: 68 MMHG | RESPIRATION RATE: 18 BRPM | SYSTOLIC BLOOD PRESSURE: 117 MMHG | TEMPERATURE: 99 F | HEIGHT: 63 IN | WEIGHT: 160.06 LBS | HEART RATE: 92 BPM | OXYGEN SATURATION: 100 %

## 2021-07-12 VITALS
TEMPERATURE: 98 F | DIASTOLIC BLOOD PRESSURE: 62 MMHG | OXYGEN SATURATION: 99 % | SYSTOLIC BLOOD PRESSURE: 121 MMHG | HEART RATE: 90 BPM | RESPIRATION RATE: 18 BRPM

## 2021-07-12 DIAGNOSIS — R11.0 NAUSEA: ICD-10-CM

## 2021-07-12 DIAGNOSIS — F31.9 BIPOLAR DISORDER, UNSPECIFIED: ICD-10-CM

## 2021-07-12 DIAGNOSIS — Z79.899 OTHER LONG TERM (CURRENT) DRUG THERAPY: ICD-10-CM

## 2021-07-12 DIAGNOSIS — Z91.013 ALLERGY TO SEAFOOD: ICD-10-CM

## 2021-07-12 DIAGNOSIS — Z88.8 ALLERGY STATUS TO OTHER DRUGS, MEDICAMENTS AND BIOLOGICAL SUBSTANCES: ICD-10-CM

## 2021-07-12 DIAGNOSIS — Z86.59 PERSONAL HISTORY OF OTHER MENTAL AND BEHAVIORAL DISORDERS: ICD-10-CM

## 2021-07-12 DIAGNOSIS — Z86.69 PERSONAL HISTORY OF OTHER DISEASES OF THE NERVOUS SYSTEM AND SENSE ORGANS: ICD-10-CM

## 2021-07-12 DIAGNOSIS — F17.200 NICOTINE DEPENDENCE, UNSPECIFIED, UNCOMPLICATED: ICD-10-CM

## 2021-07-12 DIAGNOSIS — Z00.00 ENCOUNTER FOR GENERAL ADULT MEDICAL EXAMINATION WITHOUT ABNORMAL FINDINGS: ICD-10-CM

## 2021-07-12 PROCEDURE — 99284 EMERGENCY DEPT VISIT MOD MDM: CPT

## 2021-07-12 RX ORDER — ONDANSETRON 8 MG/1
4 TABLET, FILM COATED ORAL ONCE
Refills: 0 | Status: DISCONTINUED | OUTPATIENT
Start: 2021-07-12 | End: 2021-07-12

## 2021-07-12 NOTE — SBIRT NOTE ADULT - NSSBIRTALCPOSREINDET_GEN_A_CORE
Positive reinforcement provided given patient currently within healthy guidelines. Education materials reviewed and given to patient.

## 2021-07-12 NOTE — ED PROVIDER NOTE - CLINICAL SUMMARY MEDICAL DECISION MAKING FREE TEXT BOX
pt w/ minor symptoms, cows score low. will po challenge pt w/ minor symptoms, cows score low. pt tolerated PO. will dc

## 2021-07-12 NOTE — SBIRT NOTE ADULT - NSSBIRTDRGBRIEFINTDET_GEN_A_CORE
Screening results were reviewed with the patient and patient was provided information about healthy guidelines and potential negative consequences associated with level of risk. Motivation and readiness to reduce or stop use was discussed and goals and activities to make changes were suggested/offered. Patient reports enrollment at [facility] long term program, presented to ED for clearance to return after relapse this weekend.  provided patient with telephonic SBIRT number for reference, but encouraged patient to stay with her original plan and return to [facility] to complete her treatment. Patient accepted resources, agreed that she wants to return to her program and complete a full year.

## 2021-07-12 NOTE — ED PROVIDER NOTE - PROGRESS NOTE DETAILS
Spoke to  at [facility], states they just wanted to make sure pt isn't in withdrawal. Vitals stable and pt appears clinically well. Cleared to return.

## 2021-07-12 NOTE — ED PROVIDER NOTE - PATIENT PORTAL LINK FT
You can access the FollowMyHealth Patient Portal offered by [facility] by registering at the following website: [email or web address]. By joining [employer]’s FollowMyHealth portal, you will also be able to view your health information using other applications (apps) compatible with our system.

## 2021-07-12 NOTE — ED PROVIDER NOTE - ATTENDING CONTRIBUTION TO CARE
26y F w/ hx of seizure disorder on Keppra, opioid abuse presenting for clearance to return to rehab ([facility]).  pt left friday and used heroine saturday and sunday. pt wanted to come go back to rehab today but was told to get medical clearance in the ER. pt endorses nausea but otherwise ROS negative.      vss  gen- NAD, aaox3  card-rrr  lungs-ctab, no wheezing or rhonchi  abd-sntnd, no guarding or rebound  neuro- full str/sensation, cn ii-xii grossly intact, normal coordination and gait    pt w/ minor symptoms, cows score low. will po challenge, dispo 26y F w/ hx of seizure disorder on Keppra, opioid abuse presenting for clearance to return to rehab ([facility]).  pt left friday and used heroine saturday and sunday. pt wanted to come go back to rehab today but was told to get medical clearance in the ER. pt endorses nausea but otherwise ROS negative.  pt w/ implanted contraception    vss  gen- NAD, aaox3  card-rrr  lungs-ctab, no wheezing or rhonchi  abd-sntnd, no guarding or rebound  neuro- full str/sensation, cn ii-xii grossly intact, normal coordination and gait    pt w/ minor symptoms, cows score low. will po challenge, dispo

## 2021-07-12 NOTE — ED PROVIDER NOTE - OBJECTIVE STATEMENT
The pt is a 26y F w/ hx of seizure disorder on Keppra, opioid abuse presenting for clearance to return to rehab ([facility]). Pt states she left Friday, endorses heroine use Saturday and Sunday, and wanted to return today but they told her she needed ED clearance. States she has nausea, but denies fever, headache, chest pain, SOB, emesis, abdominal pain, diarrhea.

## 2021-07-12 NOTE — ED PROVIDER NOTE - NSFOLLOWUPINSTRUCTIONS_ED_ALL_ED_FT
Cleared for return to [facility].     Return to the Emergency Room for any new or worsening symptoms.

## 2021-07-12 NOTE — ED ADULT NURSE NOTE - OBJECTIVE STATEMENT
Patient c.o nausea. Here for rehab clearance. Admits to using 10 bags of IV heroin yesterday. Denies SI/HI or visual/auditory hallucinations. Patient is calm at this time and compliant.

## 2021-07-12 NOTE — ED ADULT TRIAGE NOTE - CHIEF COMPLAINT QUOTE
[facility] on friday. has not taken methadone since. last used heroin IV yesterday. used 10 bags. pt having nausea. sent by facility for clearance to go back into rehab. denies SI/HI

## 2021-09-13 NOTE — ED PROCEDURE NOTE - NS ED PROCEDURE ASSISTED BY
Erythromycin Counseling:  I discussed with the patient the risks of erythromycin including but not limited to GI upset, allergic reaction, drug rash, diarrhea, increase in liver enzymes, and yeast infections. The procedure was performed independently

## 2021-09-24 ENCOUNTER — EMERGENCY (EMERGENCY)
Facility: HOSPITAL | Age: 26
LOS: 0 days | Discharge: HOME | End: 2021-09-24
Attending: EMERGENCY MEDICINE | Admitting: EMERGENCY MEDICINE
Payer: COMMERCIAL

## 2021-09-24 VITALS
SYSTOLIC BLOOD PRESSURE: 113 MMHG | DIASTOLIC BLOOD PRESSURE: 74 MMHG | TEMPERATURE: 98 F | HEART RATE: 98 BPM | HEIGHT: 63 IN | WEIGHT: 160.06 LBS | OXYGEN SATURATION: 98 % | RESPIRATION RATE: 18 BRPM

## 2021-09-24 DIAGNOSIS — R10.84 GENERALIZED ABDOMINAL PAIN: ICD-10-CM

## 2021-09-24 DIAGNOSIS — Z91.013 ALLERGY TO SEAFOOD: ICD-10-CM

## 2021-09-24 DIAGNOSIS — R11.10 VOMITING, UNSPECIFIED: ICD-10-CM

## 2021-09-24 DIAGNOSIS — F19.10 OTHER PSYCHOACTIVE SUBSTANCE ABUSE, UNCOMPLICATED: ICD-10-CM

## 2021-09-24 DIAGNOSIS — Z86.69 PERSONAL HISTORY OF OTHER DISEASES OF THE NERVOUS SYSTEM AND SENSE ORGANS: ICD-10-CM

## 2021-09-24 DIAGNOSIS — R11.2 NAUSEA WITH VOMITING, UNSPECIFIED: ICD-10-CM

## 2021-09-24 DIAGNOSIS — F17.200 NICOTINE DEPENDENCE, UNSPECIFIED, UNCOMPLICATED: ICD-10-CM

## 2021-09-24 DIAGNOSIS — Z88.8 ALLERGY STATUS TO OTHER DRUGS, MEDICAMENTS AND BIOLOGICAL SUBSTANCES: ICD-10-CM

## 2021-09-24 DIAGNOSIS — Z79.899 OTHER LONG TERM (CURRENT) DRUG THERAPY: ICD-10-CM

## 2021-09-24 LAB
ALBUMIN SERPL ELPH-MCNC: 4.3 G/DL — SIGNIFICANT CHANGE UP (ref 3.5–5.2)
ALP SERPL-CCNC: 94 U/L — SIGNIFICANT CHANGE UP (ref 30–115)
ALT FLD-CCNC: 49 U/L — HIGH (ref 0–41)
ANION GAP SERPL CALC-SCNC: 15 MMOL/L — HIGH (ref 7–14)
AST SERPL-CCNC: 60 U/L — HIGH (ref 0–41)
BASOPHILS # BLD AUTO: 0.03 K/UL — SIGNIFICANT CHANGE UP (ref 0–0.2)
BASOPHILS NFR BLD AUTO: 0.5 % — SIGNIFICANT CHANGE UP (ref 0–1)
BILIRUB SERPL-MCNC: <0.2 MG/DL — SIGNIFICANT CHANGE UP (ref 0.2–1.2)
BUN SERPL-MCNC: 12 MG/DL — SIGNIFICANT CHANGE UP (ref 10–20)
CALCIUM SERPL-MCNC: 8.8 MG/DL — SIGNIFICANT CHANGE UP (ref 8.5–10.1)
CHLORIDE SERPL-SCNC: 100 MMOL/L — SIGNIFICANT CHANGE UP (ref 98–110)
CO2 SERPL-SCNC: 21 MMOL/L — SIGNIFICANT CHANGE UP (ref 17–32)
CREAT SERPL-MCNC: 0.6 MG/DL — LOW (ref 0.7–1.5)
EOSINOPHIL # BLD AUTO: 0.07 K/UL — SIGNIFICANT CHANGE UP (ref 0–0.7)
EOSINOPHIL NFR BLD AUTO: 1.2 % — SIGNIFICANT CHANGE UP (ref 0–8)
GLUCOSE SERPL-MCNC: 90 MG/DL — SIGNIFICANT CHANGE UP (ref 70–99)
HCG SERPL QL: NEGATIVE — SIGNIFICANT CHANGE UP
HCT VFR BLD CALC: 33.7 % — LOW (ref 37–47)
HGB BLD-MCNC: 11.2 G/DL — LOW (ref 12–16)
IMM GRANULOCYTES NFR BLD AUTO: 0.4 % — HIGH (ref 0.1–0.3)
LACTATE SERPL-SCNC: 1.2 MMOL/L — SIGNIFICANT CHANGE UP (ref 0.7–2)
LIDOCAIN IGE QN: 9 U/L — SIGNIFICANT CHANGE UP (ref 7–60)
LYMPHOCYTES # BLD AUTO: 1.72 K/UL — SIGNIFICANT CHANGE UP (ref 1.2–3.4)
LYMPHOCYTES # BLD AUTO: 30.6 % — SIGNIFICANT CHANGE UP (ref 20.5–51.1)
MCHC RBC-ENTMCNC: 28.3 PG — SIGNIFICANT CHANGE UP (ref 27–31)
MCHC RBC-ENTMCNC: 33.2 G/DL — SIGNIFICANT CHANGE UP (ref 32–37)
MCV RBC AUTO: 85.1 FL — SIGNIFICANT CHANGE UP (ref 81–99)
MONOCYTES # BLD AUTO: 0.63 K/UL — HIGH (ref 0.1–0.6)
MONOCYTES NFR BLD AUTO: 11.2 % — HIGH (ref 1.7–9.3)
NEUTROPHILS # BLD AUTO: 3.15 K/UL — SIGNIFICANT CHANGE UP (ref 1.4–6.5)
NEUTROPHILS NFR BLD AUTO: 56.1 % — SIGNIFICANT CHANGE UP (ref 42.2–75.2)
NRBC # BLD: 0 /100 WBCS — SIGNIFICANT CHANGE UP (ref 0–0)
PLATELET # BLD AUTO: 223 K/UL — SIGNIFICANT CHANGE UP (ref 130–400)
POTASSIUM SERPL-MCNC: 3.3 MMOL/L — LOW (ref 3.5–5)
POTASSIUM SERPL-SCNC: 3.3 MMOL/L — LOW (ref 3.5–5)
PROT SERPL-MCNC: 7.1 G/DL — SIGNIFICANT CHANGE UP (ref 6–8)
RBC # BLD: 3.96 M/UL — LOW (ref 4.2–5.4)
RBC # FLD: 12.6 % — SIGNIFICANT CHANGE UP (ref 11.5–14.5)
SODIUM SERPL-SCNC: 136 MMOL/L — SIGNIFICANT CHANGE UP (ref 135–146)
WBC # BLD: 5.62 K/UL — SIGNIFICANT CHANGE UP (ref 4.8–10.8)
WBC # FLD AUTO: 5.62 K/UL — SIGNIFICANT CHANGE UP (ref 4.8–10.8)

## 2021-09-24 PROCEDURE — 93010 ELECTROCARDIOGRAM REPORT: CPT

## 2021-09-24 PROCEDURE — 71045 X-RAY EXAM CHEST 1 VIEW: CPT | Mod: 26

## 2021-09-24 PROCEDURE — 99285 EMERGENCY DEPT VISIT HI MDM: CPT

## 2021-09-24 RX ORDER — ONDANSETRON 8 MG/1
4 TABLET, FILM COATED ORAL ONCE
Refills: 0 | Status: COMPLETED | OUTPATIENT
Start: 2021-09-24 | End: 2021-09-24

## 2021-09-24 RX ORDER — SODIUM CHLORIDE 9 MG/ML
1000 INJECTION, SOLUTION INTRAVENOUS ONCE
Refills: 0 | Status: COMPLETED | OUTPATIENT
Start: 2021-09-24 | End: 2021-09-24

## 2021-09-24 RX ORDER — ONDANSETRON 8 MG/1
4 TABLET, FILM COATED ORAL ONCE
Refills: 0 | Status: DISCONTINUED | OUTPATIENT
Start: 2021-09-24 | End: 2021-09-24

## 2021-09-24 RX ORDER — FAMOTIDINE 10 MG/ML
20 INJECTION INTRAVENOUS ONCE
Refills: 0 | Status: COMPLETED | OUTPATIENT
Start: 2021-09-24 | End: 2021-09-24

## 2021-09-24 RX ORDER — ONDANSETRON 8 MG/1
1 TABLET, FILM COATED ORAL
Qty: 6 | Refills: 0
Start: 2021-09-24 | End: 2021-09-26

## 2021-09-24 RX ADMIN — ONDANSETRON 4 MILLIGRAM(S): 8 TABLET, FILM COATED ORAL at 08:38

## 2021-09-24 RX ADMIN — SODIUM CHLORIDE 1000 MILLILITER(S): 9 INJECTION, SOLUTION INTRAVENOUS at 08:38

## 2021-09-24 RX ADMIN — FAMOTIDINE 20 MILLIGRAM(S): 10 INJECTION INTRAVENOUS at 08:38

## 2021-09-24 NOTE — ED PROVIDER NOTE - PATIENT PORTAL LINK FT
You can access the FollowMyHealth Patient Portal offered by [facility] by registering at the following website: [email or web address]. By joining [employer]’s FollowMyHealth portal, you will also be able to view your health information using other applications (apps) compatible with our system.

## 2021-09-24 NOTE — ED PROVIDER NOTE - PHYSICAL EXAMINATION
CONSTITUTIONAL: Well-developed; well-nourished; in no acute distress.   SKIN: warm, dry  HEAD: Normocephalic; atraumatic.  EYES: no conjunctival injection. EOMI.   ENT: No nasal discharge; airway clear.  NECK: Supple; non tender.  CARD: S1, S2 normal; Regular rate and rhythm.   RESP: No wheezes, rales or rhonchi.  ABD: soft nondistended, nontender. no CVA TTP.   EXT: Normal ROM.  No lower extremity edema.   LYMPH: No acute cervical adenopathy.  NEURO: Alert, oriented, grossly unremarkable. no FND.   PSYCH: Cooperative, appropriate.

## 2021-09-24 NOTE — ED PROVIDER NOTE - NS ED ROS FT
Review of Systems:  CONSTITUTIONAL: No fever, No diaphoresis   SKIN: No rash  HEMATOLOGIC: No abnormal bleeding   EYES: No eye pain, No blurred vision  ENT: No sore throat, No neck pain, No rhinorrhea, No ear pain  RESPIRATORY: No shortness of breath, No cough  CARDIAC: No chest pain, No palpitations  GI: +abdominal pain, + nausea, +vomiting, No diarrhea, No constipation, No bright red blood per rectum or melena. No flank pain.   : No dysuria, frequency, hematuria.   MUSCULOSKELETAL: No joint paint, No swelling, No back pain  NEUROLOGIC: No numbness, No focal weakness, No headache, No dizziness  All other systems negative, unless specified in HPI

## 2021-09-24 NOTE — ED ADULT NURSE NOTE - CHIEF COMPLAINT QUOTE
C/o abd pain and vomiting with difficulty holding down food. Pt was seen at [facility] 2 days ago for same complaints, pending results of blood cultures. Pt admits to IVDU and thinks it may be related. On Methadone daily, last heroin drug use last night

## 2021-09-24 NOTE — ED PROVIDER NOTE - CARE PLAN
Principal Discharge DX:	Nausea and vomiting  Secondary Diagnosis:	Abdominal pain  Secondary Diagnosis:	IV drug user   1

## 2021-09-24 NOTE — ED ADULT TRIAGE NOTE - CHIEF COMPLAINT QUOTE
C/o abd pain and vomiting with difficulty holding down food. Pt was seen at [facility] 2 days ago for same complaints, pending results of blood cultures. Pt admits to IVDU and thinks it may be related. On Methadone daily, last heroin drug use last night

## 2021-09-24 NOTE — ED PROVIDER NOTE - PROGRESS NOTE DETAILS
Authored by Nydia Macias DO: Patient feeling better after meds, drinking apple juice. Patient to be discharged from ED. Any available test results were discussed with patient and/or family. Verbal instructions given, including instructions to return to ED immediately for any new, worsening, or concerning symptoms. Patient endorsed understanding. Written discharge instructions additionally given, including follow-up plan. Provided DETOX information, and medicine clinic. Patient has follow up with Methadone clinic as well.

## 2021-09-24 NOTE — ED PROVIDER NOTE - ATTENDING CONTRIBUTION TO CARE
I personally evaluated patient. I agree with the findings and plan with all documentation on chart except as documented  in my note.    25 y/o F PMHx seizure, opioid abuse (IVDA) presents to ED with mild diffuse abdominal pain and vomiting x2 days. Similar symptoms in the past with withdrawal symptoms. No fever or signs of infectious etiology and patient just had blood cultures sent and evaluation at [facility]. IV placed and labs sent. Abdominal exam is benign. Fluids and GI meds given and patient felt improved and is tolerating PO. Appendicitis warning signs discussed.    Patient is a good candidate to attempt outpatient management. Supportive care and home care discussed in detail. Patient aware they may have to return for re-evaluation and possible admission if outpatient treatment fails. Strict return precautions discussed.    Full DC instructions discussed and patient knows when to seek immediate medical attention.  Patient has proper follow up.  All results discussed and patient aware they may require further work up.  Proper follow up ensured. Limitations of ED work up discussed.  Medications administered and prescribed/OTC home meds discussed.  All questions and concerns from patient or family addressed. Understanding of instructions verbalized.

## 2021-09-24 NOTE — ED PROVIDER NOTE - CLINICAL SUMMARY MEDICAL DECISION MAKING FREE TEXT BOX
27 y/o F PMHx seizure, opioid abuse (IVDA) presents to ED with mild diffuse abdominal pain and vomiting x2 days. Similar symptoms in the past with withdrawal symptoms. No fever or signs of infectious etiology and patient just had blood cultures sent and evaluation at [facility]. IV placed and labs sent. Abdominal exam is benign. Fluids and GI meds given and patient felt improved and is tolerating PO. Appendicitis warning signs discussed.    Patient is a good candidate to attempt outpatient management. Supportive care and home care discussed in detail. Patient aware they may have to return for re-evaluation and possible admission if outpatient treatment fails. Strict return precautions discussed.

## 2021-09-24 NOTE — ED PROVIDER NOTE - OBJECTIVE STATEMENT
27 y/o F PMHx seizure, opioid abuse (IVDA) presents to ED with mild diffuse abdominal pain and vomiting x2 days. Pt reports she was seen in [facility] on Wednesday, had blood cx sent and was dc with nausea medication. Pt on methadone daily, last used heroin last night. No hx abdominal surgeries. No diarrhea.

## 2021-09-24 NOTE — ED PROVIDER NOTE - NSFOLLOWUPINSTRUCTIONS_ED_ALL_ED_FT
Drug Abuse    Chemical dependency is an addiction to drugs or alcohol. It is characterized by the repeated behavior of seeking out and using drugs and alcohol despite harmful consequences to the health and safety of ones self and others.     SEEK IMMEDIATE MEDICAL CARE IF YOU HAVE THE FOLLOWING SYMPTOMS: chest pain, shortness of breath, change in mental status, thoughts about hurting killing yourself, thoughts about hurting or killing somebody else, hallucinations, or worsening depression.    Acute Nausea and Vomiting    WHAT YOU NEED TO KNOW:    Acute nausea and vomiting start suddenly, worsen quickly, and last a short time.    DISCHARGE INSTRUCTIONS:    Return to the emergency department if:     You see blood in your vomit or your bowel movements.      You have sudden, severe pain in your chest and upper abdomen after hard vomiting or retching.      You have swelling in your neck and chest.       You are dizzy, cold, and thirsty and your eyes and mouth are dry.      You are urinating very little or not at all.      You have muscle weakness, leg cramps, and trouble breathing.       Your heart is beating much faster than normal.       You continue to vomit for more than 48 hours.     Contact your healthcare provider if:     You have frequent dry heaves (vomiting but nothing comes out).      Your nausea and vomiting does not get better or go away after you use medicine.      You have questions or concerns about your condition or treatment.    Medicines: You may need any of the following:     Medicines may be given to calm your stomach and stop your vomiting. You may also need medicines to help you feel more relaxed or to stop nausea and vomiting caused by motion sickness.      Gastrointestinal stimulants are used to help empty your stomach and bowels. This may help decrease nausea and vomiting.      Take your medicine as directed. Contact your healthcare provider if you think your medicine is not helping or if you have side effects. Tell him or her if you are allergic to any medicine. Keep a list of the medicines, vitamins, and herbs you take. Include the amounts, and when and why you take them. Bring the list or the pill bottles to follow-up visits. Carry your medicine list with you in case of an emergency.    Prevent or manage acute nausea and vomiting:     Do not drink alcohol. Alcohol may upset or irritate your stomach. Too much alcohol can also cause acute nausea and vomiting.      Control stress. Headaches due to stress may cause nausea and vomiting. Find ways to relax and manage your stress. Get more rest and sleep.      Drink more liquids as directed. Vomiting can lead to dehydration. It is important to drink more liquids to help replace lost body fluids. Ask your healthcare provider how much liquid to drink each day and which liquids are best for you. Your provider may recommend that you drink an oral rehydration solution (ORS). ORS contains water, salts, and sugar that are needed to replace the lost body fluids. Ask what kind of ORS to use, how much to drink, and where to get it.      Eat smaller meals, more often. Eat small amounts of food every 2 to 3 hours, even if you are not hungry. Food in your stomach may decrease your nausea.      Talk to your healthcare provider before you take over-the-counter (OTC) medicines. These medicines can cause serious problems if you use certain other medicines, or you have a medical condition. You may have problems if you use too much or use them for longer than the label says. Follow directions on the label carefully.     Follow up with your healthcare provider as directed: Write down your questions so you remember to ask them during your follow-up visits.       © Copyright [employer] 2019 All illustrations and images included in CareNotes are the copyrighted property of [employer]M., Inc[employer] or [employer].

## 2021-09-24 NOTE — ED PROVIDER NOTE - NSFOLLOWUPCLINICS_GEN_ALL_ED_FT
[facility]  Detox Mgmt  [address] [place] [address]  Phone: [phone]  Fax:   Follow Up Time: Routine    [facility]  Medicine  [address], [place]   Phone: [phone]  Fax:   Follow Up Time: Routine

## 2021-10-19 ENCOUNTER — EMERGENCY (EMERGENCY)
Facility: HOSPITAL | Age: 26
LOS: 0 days | Discharge: HOME | End: 2021-10-19
Attending: EMERGENCY MEDICINE | Admitting: EMERGENCY MEDICINE
Payer: COMMERCIAL

## 2021-10-19 VITALS
HEART RATE: 97 BPM | TEMPERATURE: 98 F | SYSTOLIC BLOOD PRESSURE: 101 MMHG | RESPIRATION RATE: 20 BRPM | DIASTOLIC BLOOD PRESSURE: 66 MMHG | HEIGHT: 63 IN | OXYGEN SATURATION: 98 %

## 2021-10-19 DIAGNOSIS — Z91.013 ALLERGY TO SEAFOOD: ICD-10-CM

## 2021-10-19 DIAGNOSIS — G40.909 EPILEPSY, UNSPECIFIED, NOT INTRACTABLE, WITHOUT STATUS EPILEPTICUS: ICD-10-CM

## 2021-10-19 DIAGNOSIS — S60.462A INSECT BITE (NONVENOMOUS) OF RIGHT MIDDLE FINGER, INITIAL ENCOUNTER: ICD-10-CM

## 2021-10-19 DIAGNOSIS — Z88.8 ALLERGY STATUS TO OTHER DRUGS, MEDICAMENTS AND BIOLOGICAL SUBSTANCES: ICD-10-CM
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
## 2021-10-19 DIAGNOSIS — R11.10 VOMITING, UNSPECIFIED: ICD-10-CM

## 2021-10-19 DIAGNOSIS — F31.9 BIPOLAR DISORDER, UNSPECIFIED: ICD-10-CM

## 2021-10-19 PROCEDURE — 99284 EMERGENCY DEPT VISIT MOD MDM: CPT

## 2021-10-19 RX ORDER — ONDANSETRON 8 MG/1
4 TABLET, FILM COATED ORAL ONCE
Refills: 0 | Status: COMPLETED | OUTPATIENT
Start: 2021-10-19 | End: 2021-10-19

## 2021-10-19 RX ORDER — DEXAMETHASONE 0.5 MG/5ML
10 ELIXIR ORAL ONCE
Refills: 0 | Status: COMPLETED | OUTPATIENT
Start: 2021-10-19 | End: 2021-10-19

## 2021-10-19 RX ORDER — DIPHENHYDRAMINE HCL 50 MG
50 CAPSULE ORAL ONCE
Refills: 0 | Status: COMPLETED | OUTPATIENT
Start: 2021-10-19 | End: 2021-10-19

## 2021-10-19 RX ADMIN — ONDANSETRON 4 MILLIGRAM(S): 8 TABLET, FILM COATED ORAL at 17:05

## 2021-10-19 RX ADMIN — Medication 50 MILLIGRAM(S): at 17:04

## 2021-10-19 RX ADMIN — Medication 10 MILLIGRAM(S): at 17:01

## 2021-10-19 NOTE — ED PROVIDER NOTE - NSFOLLOWUPINSTRUCTIONS_ED_ALL_ED_FT
General Allergic Reaction    WHAT YOU NEED TO KNOW:    An allergic reaction is your body's response to an allergen. Allergens include medicines, food, insect stings, animal dander, mold, latex, chemicals, and dust mites. Pollen from trees, grass, and weeds can also cause an allergic reaction. An allergic reaction can range from mild to severe.    DISCHARGE INSTRUCTIONS:    Call 911 for signs or symptoms of anaphylaxis, such as trouble breathing, swelling in your mouth or throat, or wheezing. You may also have itching, a rash, hives, or feel like you are going to faint.    Return to the emergency department if:     You have a skin rash, hives, swelling, or itching that is starting to get worse.      Your throat tightens, or your lips or tongue swell.      You have trouble swallowing or speaking.      You have worsening nausea, diarrhea, or abdominal cramps, or you are vomiting.      You have chest pain or tightness.    Contact your healthcare provider if:     You have questions or concerns about your condition or care.        Medicines: You may need any of the following:     Medicines may be given to relieve certain allergy symptoms such as itching, sneezing, and swelling. You may take them as a pill or use drops in your nose or eyes. Topical treatments may be given to put directly on your skin to help decrease itching or swelling.      Epinephrine may be prescribed if you are at risk for anaphylaxis. This is a severe allergic reaction that can be life-threatening. Your healthcare provider will tell you if you need to keep epinephrine with you. You will be taught when and how to use it.      Take your medicine as directed. Contact your healthcare provider if you think your medicine is not helping or if you have side effects. Tell him of her if you are allergic to any medicine. Keep a list of the medicines, vitamins, and herbs you take. Include the amounts, and when and why you take them. Bring the list or the pill bottles to follow-up visits. Carry your medicine list with you in case of an emergency.    Follow up with your healthcare provider as directed: Write down your questions so you remember to ask them during your visits.     Manage your symptoms:     Avoid allergens. You may need to have allergy testing with your healthcare provider or a specialist to find your allergens.      Use cold compresses on your skin or eyes. This will help soothe skin or eyes affected by the allergic reaction. You can make a cold compress by soaking a washcloth in cool water. Wring out the extra water before you apply the washcloth.      Rinse your nasal passages with a saline solution. Daily rinsing may help clear allergens out of your nose. Use distilled water if possible. You can also boil tap water and then let it cool before you use it. Do not use tap water without boiling it first.      Do not smoke. Nicotine and other chemicals in cigarettes and cigars can make an allergic reaction worse, and can also cause lung damage. Ask your healthcare provider for information if you currently smoke and need help to quit. E-cigarettes or smokeless tobacco still contain nicotine. Talk to your healthcare provider before you use these products.          © Copyright [employer] 2019 All illustrations and images included in CareNotes are the copyrighted property of [employer]., Inc. or [employer].

## 2021-10-19 NOTE — ED PROVIDER NOTE - OBJECTIVE STATEMENT
27 yo F pmhx seizures, bipolar d/o depression, polysubstance use, currently on methadone, presenting to the ED for evaluation of bee sting to R 3rd finger 1/2 hour prior to arrival. 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 Pt reporting localized pain and swelling, one episode of vomiting. Denies any sob, lip swelling, tongue swelling, chest pain, abd pain.

## 2021-10-19 NOTE — ED PROVIDER NOTE - PATIENT PORTAL LINK FT
You can access the FollowMyHealth Patient Portal offered by [facility] by registering at the following website: [email or web address]. By joining [employer]’s FollowMyHealth portal, you will also be able to view your health information using other applications (apps) compatible with our system.

## 2021-10-19 NOTE — ED PROVIDER NOTE - ATTENDING CONTRIBUTION TO CARE
26F PMH sz, opiate abuse states clean since june on methadone maintanace, p/w bee sting to R 4th digit 30 mins ago. c/o 1 episode nbnb emesis. no itching,  hives, rash. no cp, sob. no abd pain, d/c. no fever, cough. no throat swelling. pt states her mother always gave her epipen after bee sting so she came to ED.     on exam, AFVSS, well mattie nad, ncat, eomi, perrla, mmm, no op edema, lctab, rrr nl s1s2 no mrg, abd soft ntnd, aaox3, no focal deficits, no le edema or calf ttp, tiny puncture to distal R 4th finger, no erythema/edema,    a/p; Will give benadryl, pepcid, zofran re-eval

## 2021-10-19 NOTE — ED PROVIDER NOTE - PHYSICAL EXAMINATION
GENERAL: Well-nourished, Well-developed. NAD.  HEAD: No visible or palpable bumps or hematomas. No ecchymosis behind ears B/L.  Eyes: PERRLA, EOMI. No asymmetry. No nystagmus. No conjunctival injection. Non-icteric sclera.  ENMT: MMM. No pharyngeal erythema or exudates. Uvula midline. No oral lesions. handling secretions. airway patent. no tongue or lip swelling.   Neck: Supple. FROM  CVS: Normal S1,S2. No murmurs appreciated on auscultation   RESP: No use of accessory muscles. Chest rise symmetrical with good expansion. Lungs clear to auscultation B/L. No wheezing, rales, or rhonchi auscultated.  Skin: (+)localized erythema and swelling to R 3rd digit, visible bee sting to dorsal aspect of finger, no stinger or foreign body noted. No crepitus, no fluctuance.   EXT: Radial and pedal pulses present B/L. No calf tenderness or swelling B/L. No palpable cords. No pedal edema B/L.  Neuro: AA&O x 3. Sensation grossly intact. Strength 5/5 B/L. Gait within normal limits.

## 2021-10-19 NOTE — ED PROVIDER NOTE - CLINICAL SUMMARY MEDICAL DECISION MAKING FREE TEXT BOX
pt feels better, obs in ED, dannielle po, dc home supportive care, f/u pmd 1-2 weeks, strict return precautions provided.

## 2021-10-19 NOTE — ED PROVIDER NOTE - NS ED ROS FT
Constitutional: (-) fever (-) malaise (-) diaphoresis (-) chills (-) wt. loss (-) bodyaches (-) night sweats  Eyes: (-) visual changes (-) eye pain (-) eye discharge (-) photophobia (-) FB sensation  ENMT: (-) nasal or chest congestion (-) runny nose (-) sore throat (-) hoarseness  (-) hearing changes (-) ear pain (-) ear discharge or infections (-) neck pain (-) neck stiffness (-)tongue swelling (-)lip swelling  Cardiac: (-) chest pain  (-) palpitations (-) syncope (-) edema  Respiratory: (-) cough(-) SOB (-) FLOWERS  GI: (-) nausea (+) vomiting (-) diarrhea (-) abdominal pain  Neuro: (-) headache (-) dizziness (-) numbness/tingling to extremities B/L (-) weakness  Skin: (-) rash (-) laceration (+)bee sting    Except as documented in the HPI, all other systems are negative.

## 2023-01-18 NOTE — ED ADULT NURSE NOTE - BRADEN SCORE (IF 18 OR LESS ACTIVATE SKIN INJURY RISK INCREASED GUIDELINE), MLM
Acitretin Counseling:  I discussed with the patient the risks of acitretin including but not limited to hair loss, dry lips/skin/eyes, liver damage, hyperlipidemia, depression/suicidal ideation, photosensitivity.  Serious rare side effects can include but are not limited to pancreatitis, pseudotumor cerebri, bony changes, clot formation/stroke/heart attack.  Patient understands that alcohol is contraindicated since it can result in liver toxicity and significantly prolong the elimination of the drug by many years. 23

## 2023-05-03 NOTE — ED ADULT NURSE NOTE - RN DISCHARGE SIGNATURE
Head, normocephalic, atraumatic, Face, Face within normal limits, Ears, External ears within normal limits 04-Mar-2018

## 2023-11-03 NOTE — ED ADULT NURSE NOTE - NSFALLRSKASSESSTYPE_ED_ALL_ED
Physical Therapy Evaluation    Patient Name:  Mal Garcia   MRN:  [id number]    Recommendations:     Discharge Recommendations: Low Intensity Therapy   Discharge Equipment Recommendations: walker, rolling   Barriers to discharge: None    Assessment:     Mal Garcia is a 72 y.o. female admitted with a medical diagnosis of Primary osteoarthritis of left knee.  She presents with the following impairments/functional limitations: weakness, impaired endurance, impaired functional mobility, decreased lower extremity function, pain, decreased ROM, edema, orthopedic precautions.    Rehab Prognosis: Good; patient would benefit from acute skilled PT services to address these deficits and reach maximum level of function.    Recent Surgery: Procedure(s) (LRB):  ROBOTIC ARTHROPLASTY, KNEE, TOTAL (Left) 1 Day Post-Op    Plan:     During this hospitalization, patient to be seen BID to address the identified rehab impairments via gait training, therapeutic activities, therapeutic exercises and progress toward the following goals:    Plan of Care Expires:  11/09/23    Subjective     Chief Complaint: L knee pain  Patient/Family Comments/goals:   Pain/Comfort:  Location - Side 1: Left  Location 1: knee  Pain Addressed 1: Pre-medicate for activity, Reposition, Distraction, Cessation of Activity    Patients cultural, spiritual, Catholic conflicts given the current situation:      Living Environment:  Pt lives in single story home with , only small threshold to enter.  Prior to admission, patients level of function was independent.  Equipment used at home: none.  DME owned (not currently used): none.  Upon discharge, patient will have assistance from .    Objective:     Communicated with nurse prior to session.  Patient found supine with peripheral IV  upon PT entry to room.    General Precautions: Standard, fall  Orthopedic Precautions:LLE weight bearing as tolerated   Braces:    Respiratory Status: Room  "air    Exams:  RLE ROM: WFL  RLE Strength: WFL  LLE ROM:     (In degrees) AROM PROM   L knee flexion 95 105   L knee extension 0       LLE Strength: Nt dt sx side    Functional Mobility:  Bed Mobility:     Supine to Sit: stand by assistance  Transfers:     Sit to Stand:  contact guard assistance with rolling walker  Toilet Transfer: contact guard assistance with  rolling walker  using  Step Transfer  Car Transfer: contact guard assistance with  rolling walker  using  Step Transfer  Gait: Pt ambulated 300 ft. W rw and SBA, using step through gait pattern at normal pace.  Stairs:  Pt ascended/descended 3 stair(s) and 4" curb step with Rolling Walker with bilateral handrails with Contact Guard Assistance.       Treatment & Education:  Pt edu on total knee protocol and importance of frequent mobility  Pt completed prehab prior to sx.  Pt completed TKA therex x10 AROM    Patient left up in chair with all lines intact, call button in reach, nurse notified, and  present.    GOALS:   Multidisciplinary Problems       Physical Therapy Goals          Problem: Physical Therapy    Goal Priority Disciplines Outcome Goal Variances Interventions   Physical Therapy Goal     PT, PT/OT Ongoing, Progressing     Description: Pt will improve functional independence by performing:    Bed mobility: SBA  Sit to stand: SBA with rolling walker MET  Bed to chair: SBA with Stand Step  with rolling walker   Car Transfer: SBA with rolling walker MET  Ambulation x 200'  feet with SBA and rolling walker MET  1 Step (Curb): Min A  and rolling walker MET  3 Steps: Min A  and B HR MET  left knee AROM flexion (in degrees): 90 MET  left knee AROM extension (in degrees): 0  MET  Independent with total knee HEP                          History:     Past Medical History:   Diagnosis Date    Anxiety disorder, unspecified     Arthritis 05/26/2022    Cancer 2013    [facility] breast    Depression 05/26/2022    Unspecified cataract        Past Surgical " History:   Procedure Laterality Date    CATARACT EXTRACTION Left 06/14/2022    EYE SURGERY  2022    [facility]    HERNIA REPAIR Bilateral 1983    groin    ROBOTIC ARTHROPLASTY, KNEE Left 11/2/2023    Procedure: ROBOTIC ARTHROPLASTY, KNEE, TOTAL;  Surgeon: Uri Leos MD;  Location: [facility];  Service: Orthopedics;  Laterality: Left;    SPINE SURGERY  1995    herinated disc repair    SURGICAL REMOVAL OF LYMPH NODE Right 2013    Rt breast, RUE Alert    TUBAL LIGATION  1986    VARICOSE VEIN SURGERY         Time Tracking:     PT Received On:    PT Start Time: 0850     PT Stop Time: 0923  PT Total Time (min): 33 min     Billable Minutes: Evaluation 15 and Gait Training 18      11/03/2023   Initial (On Arrival)

## 2024-04-16 ENCOUNTER — EMERGENCY (EMERGENCY)
Facility: HOSPITAL | Age: 29
LOS: 0 days | Discharge: HOME | End: 2024-04-16
Attending: EMERGENCY MEDICINE
Payer: MEDICAID

## 2024-04-16 VITALS
WEIGHT: 119.93 LBS | OXYGEN SATURATION: 98 % | HEIGHT: 63 IN | TEMPERATURE: 98 F | SYSTOLIC BLOOD PRESSURE: 122 MMHG | HEART RATE: 91 BPM | RESPIRATION RATE: 17 BRPM | DIASTOLIC BLOOD PRESSURE: 61 MMHG

## 2024-04-16 DIAGNOSIS — F17.200 NICOTINE DEPENDENCE, UNSPECIFIED, UNCOMPLICATED: ICD-10-CM

## 2024-04-16 DIAGNOSIS — F31.9 BIPOLAR DISORDER, UNSPECIFIED: ICD-10-CM

## 2024-04-16 DIAGNOSIS — J02.9 ACUTE PHARYNGITIS, UNSPECIFIED: ICD-10-CM

## 2024-04-16 DIAGNOSIS — R11.0 NAUSEA: ICD-10-CM

## 2024-04-16 DIAGNOSIS — Z88.8 ALLERGY STATUS TO OTHER DRUGS, MEDICAMENTS AND BIOLOGICAL SUBSTANCES: ICD-10-CM

## 2024-04-16 DIAGNOSIS — R07.0 PAIN IN THROAT: ICD-10-CM

## 2024-04-16 DIAGNOSIS — Z91.013 ALLERGY TO SEAFOOD: ICD-10-CM

## 2024-04-16 PROCEDURE — 99283 EMERGENCY DEPT VISIT LOW MDM: CPT

## 2024-04-16 PROCEDURE — 99284 EMERGENCY DEPT VISIT MOD MDM: CPT

## 2024-04-16 RX ORDER — DEXAMETHASONE 0.5 MG/5ML
10 ELIXIR ORAL ONCE
Refills: 0 | Status: COMPLETED | OUTPATIENT
Start: 2024-04-16 | End: 2024-04-16

## 2024-04-16 RX ORDER — ONDANSETRON 8 MG/1
4 TABLET, FILM COATED ORAL ONCE
Refills: 0 | Status: COMPLETED | OUTPATIENT
Start: 2024-04-16 | End: 2024-04-16

## 2024-04-16 RX ORDER — IBUPROFEN 200 MG
600 TABLET ORAL ONCE
Refills: 0 | Status: COMPLETED | OUTPATIENT
Start: 2024-04-16 | End: 2024-04-16

## 2024-04-16 RX ADMIN — ONDANSETRON 4 MILLIGRAM(S): 8 TABLET, FILM COATED ORAL at 17:53

## 2024-04-16 RX ADMIN — Medication 600 MILLIGRAM(S): at 17:14

## 2024-04-16 RX ADMIN — Medication 10 MILLIGRAM(S): at 17:13

## 2024-04-16 NOTE — ED PROVIDER NOTE - NSFOLLOWUPINSTRUCTIONS_ED_ALL_ED_FT
Pharyngitis    Pharyngitis is inflammation of your pharynx, which is typically caused by a viral or bacterial infection. Pharyngitis can be contagious and may spread from person to person through intimate contact, coughing, sneezing, or sharing personal items and utensils. Symptoms of pharyngitis may include sore throat, fever, headache, or swollen lymph nodes. If you are prescribed antibiotics, make sure you finish them even if you start to feel better. Gargle with salt water as often as every 1-2 hours to soothe your throat. Throat lozenges (if you are not at risk for choking) or sprays may be used to soothe your throat.    SEEK IMMEDIATE MEDICAL CARE IF YOU HAVE ANY OF THE FOLLOWING SYMPTOMS: neck stiffness, drooling, hoarseness or change in voice, inability to swallow liquids, vomiting, or trouble breathing.    Please follow up with your primary care doctor within one week.

## 2024-04-16 NOTE — ED PROVIDER NOTE - IV ALTEPLASE ADMIN OUTSIDE HIDDEN
Called pt, scheduled TURP 2/28 at [facility]. Gave verbal instructions and mailed. Did not schedule postop appt since pt commented he might need to postpone procedure. Advised him to c/b as soon as he knows for sure   show

## 2024-04-16 NOTE — ED PROVIDER NOTE - PHYSICAL EXAMINATION
CONSTITUTIONAL: Well-appearing; well-nourished; in no apparent distress.   EYES: PERRL; EOM intact.   ENT: normal nose; no rhinorrhea; + erythematous pharynx. No exudates. Uvula midline. Phonation Intact. No drooling  NECK: Supple; non-tender; no cervical lymphadenopathy.   CARDIOVASCULAR: Normal S1, S2; no murmurs, rubs, or gallops.   RESPIRATORY: Pt speaking full sentences. Normal chest excursion with respiration; breath sounds clear and equal bilaterally; no wheezes, rhonchi, or rales.  MS: No evidence of trauma or deformity. Normal ROM in all four extremities; non-tender to palpation; distal pulses are normal.   SKIN: Normal for age and race; warm; dry; good turgor; no apparent lesions or exudate.   NEURO/PSYCH: A & O x 4; grossly unremarkable. mood and manner are appropriate. Grooming and personal hygiene are appropriate. No apparent thoughts of harm to self or others.

## 2024-04-16 NOTE — ED ADULT NURSE NOTE - NSFALLUNIVINTERV_ED_ALL_ED
Bed/Stretcher in lowest position, wheels locked, appropriate side rails in place/Call bell, personal items and telephone in reach/Instruct patient to call for assistance before getting out of bed/chair/stretcher/Non-slip footwear applied when patient is off stretcher/[place] to call system/Physically safe environment - no spills, clutter or unnecessary equipment/Purposeful proactive rounding/Room/bathroom lighting operational, light cord in reach

## 2024-04-16 NOTE — ED PROVIDER NOTE - ATTENDING APP SHARED VISIT CONTRIBUTION OF CARE
28-year-old female no past med history presents with throat pain.  Patient reports for the last few days she has been having pain sore throat with swallowing.  No fevers.  Positive chills.  Reports an episode of nausea vomiting this morning which is since resolved.  No congestion or runny nose.  States that her child was sick 2 weeks ago no other known sick contacts.  No diarrhea.  No chest pain shortness of breath or palpitations.  No abdominal pain.  No urinary symptoms    CONSTITUTIONAL: Well-developed; well-nourished; in no acute distress.   SKIN: warm, dry  HEAD: Normocephalic; atraumatic.  EYES: PERRL, EOMI, no conjunctival erythema  ENT: No nasal discharge; airway clear. + erythematous pharynx, no exudates. + cervical lymphadenopathy bilaterally, soft, mobile.   NECK: Supple; non tender.  CARD: S1, S2 normal;  Regular rate and rhythm.   RESP: No wheezes, rales or rhonchi.  ABD: soft non tender, non distended, no rebound or guarding  EXT: Normal ROM.  5/5 strength in all 4 extremities   LYMPH: No acute cervical adenopathy.  NEURO: Alert, oriented, grossly unremarkable. neurovascularly intact  PSYCH: Cooperative, appropriate.

## 2024-04-16 NOTE — ED PROVIDER NOTE - OBJECTIVE STATEMENT
28 year old F hx of seizure, bipolar d/o presenting to er for eval of sore throat x 3 days. + painful swallowing, nausea and mildly decreased po secondary to pain. Sts her child was recently sick with uri symptoms. No fever, difficulty swallowing, voice changes, hoarseness, drooling, neck pain/stiffness, sob, recent travel, sob.

## 2024-04-16 NOTE — ED PROVIDER NOTE - PATIENT PORTAL LINK FT
You can access the FollowMyHealth Patient Portal offered by [facility] by registering at the following website: [email or web address]. By joining [employer]’s FollowMyHealth portal, you will also be able to view your health information using other applications (apps) compatible with our system.

## 2024-04-16 NOTE — ED PROVIDER NOTE - CLINICAL SUMMARY MEDICAL DECISION MAKING FREE TEXT BOX
Patient presents with throat pain.  Positive erythema on exam with lymphadenopathy.  Concern for strep throat.  Antibiotics, Decadron and NSAIDs given.  Discharged with PMD follow-up and return precautions.

## 2024-04-25 NOTE — ED PROVIDER NOTE - NS ED ATTENDING STATEMENT MOD
I have personally performed a face to face diagnostic evaluation on this patient. I have reviewed the ACP note and agree with the history, exam and plan of care, except as noted. chf exacerbation

## 2024-05-14 NOTE — SBIRT NOTE ADULT - NSSBIRTDRGNEGLECT_GEN_A_CORE
[Sharp] : sharp [de-identified] : 05/14/2024: 72 M is here for bilateral foot pain. prior 2nd hammertoe surgery in 2022 w/ dpm with ongoing pain. denies post op infection. having ongoing pain. denies dm/tob. no recent injury/treatment. c/o ongoing nerve pain -- saw neurologist in [place] who tx w/ pregabalin w/o relief.  [] : Post Surgical Visit: no [FreeTextEntry1] : bilateral foot Yes

## 2024-08-04 NOTE — PATIENT PROFILE ADULT - NSTOBACCOQUITATTEMPT_GEN_A_CORE_SD
Continued Stay SW/CM Assessment/Plan of Care Note     Progress note:  Weekend Coverage.   Chart reviewed and SW consulted for hospice planning. Aware pt was on 4EF and and transferred to SICU after having a bronchoscopy and having increased O2 needs.     SW aware MD had goals of care discussion with family and they are wanting to pursue hospice. SW advising palliative care consult as palliative can provide information about hospice, discuss hospice options, and place orders once decision is finalized.     SW/CM will continue to follow and assist with discharge needs.    See SW/CM flowsheets for other objective data.    Disposition Recommendations:  Preliminary discharge destination:    SW/CM recommendation for discharge: Home therapy    Destination Pharmacy:        Discharge Plan/Needs:     Continued Care and Services - Admitted Since 7/26/2024    No active coordination exists for this encounter.       Prior To Hospitalization:    Living Situation: Spouse and residing at House    .  Support Systems: Children, Family members, Spouse   Home Devices/Equipment: None            Mobility Assist Devices: Four wheel walker   Type of Service Prior to Hospitalization: None               Patient/Family discharge goal (s):  Home therapy     Resources provided:           Prior Function  Bed Mobility: Modified Independent (07/28/24 1003 : Gayla Mosqueda, OT)  Transfers: Modified Independent (07/28/24 1003 : Gayla Mosqueda, OT)  Ambulation in the Home: Modified  Independent (07/28/24 1003 : Gayla Mosqueda, OT)       Current Function  Last Filed Values         Value Time User    Current Function  slightly below baseline level of function 8/3/2024 11:45 AM Qing Cortés, PT    Therapy Impairments  activity tolerance 8/3/2024 11:45 AM Qing Cortés, PT    ADLs Requiring Support  bed mobility; transfers; ambulation; stairs 8/3/2024 11:45 AM Qing Cortés, PT            Therapy Recommendations for Discharge:   PT:       Last Filed Values         Value Time User    PT Discharge Needs  therapy 5 or more times per week 8/3/2024 11:45 AM Qing Cortés, PT          OT:       Last Filed Values         Value Time User    OT Discharge Needs  therapy 5 or more times per week 8/3/2024  1:33 PM Juna Carlos Wong OTA          SLP:    Last Filed Values       None            Mobility Equipment Recommended for Discharge: Owns 4 wheeled walker and cane      Barriers to Discharge  Identified Barriers to Discharge/Transition Planning: Medical necessity for acute care                   none

## 2024-08-11 NOTE — ED ADULT TRIAGE NOTE - CHIEF COMPLAINT QUOTE
Alberto maintain on ongoing SAFE precaution without incident on this shift. Observed resting in bed, respiration even and unlabored. Continuous Q 7 minutes check implemented thru the night. Took his medication this morning with water. No behavioral noted    nausea, and vomiting x 2 days, uses heroin everyday, last use was this morning with 3 bags, also took methadone 15mg, denies SI and HI.

## 2024-11-21 NOTE — ED PROVIDER NOTE - CARE PLAN
Consent: The patient's consent was obtained including but not limited to risks of crusting, scabbing, blistering, scarring, darker or lighter pigmentary change, recurrence, incomplete removal and infection. Render Post-Care Instructions In Note?: no Show Aperture Variable?: Yes Duration Of Freeze Thaw-Cycle (Seconds): 0 Post-Care Instructions: Patient is to wear sunprotection, and avoid picking at any of the treated lesions. Pt may apply Vaseline to crusted or scabbing areas. Detail Level: Detailed Detail Level: Simple Total Number Of Aks Treated: 13 Principal Discharge DX:	Toothache

## 2024-12-27 ENCOUNTER — EMERGENCY (EMERGENCY)
Facility: HOSPITAL | Age: 29
LOS: 0 days | Discharge: HOME | End: 2024-12-27
Attending: STUDENT IN AN ORGANIZED HEALTH CARE EDUCATION/TRAINING PROGRAM
Payer: MEDICAID

## 2024-12-27 VITALS
DIASTOLIC BLOOD PRESSURE: 65 MMHG | OXYGEN SATURATION: 97 % | HEART RATE: 95 BPM | RESPIRATION RATE: 20 BRPM | SYSTOLIC BLOOD PRESSURE: 101 MMHG

## 2024-12-27 VITALS
HEART RATE: 123 BPM | SYSTOLIC BLOOD PRESSURE: 94 MMHG | OXYGEN SATURATION: 98 % | TEMPERATURE: 98 F | RESPIRATION RATE: 18 BRPM | DIASTOLIC BLOOD PRESSURE: 67 MMHG | HEIGHT: 64 IN | WEIGHT: 130.07 LBS

## 2024-12-27 DIAGNOSIS — Z88.5 ALLERGY STATUS TO NARCOTIC AGENT: ICD-10-CM

## 2024-12-27 DIAGNOSIS — Z88.8 ALLERGY STATUS TO OTHER DRUGS, MEDICAMENTS AND BIOLOGICAL SUBSTANCES: ICD-10-CM

## 2024-12-27 DIAGNOSIS — E86.0 DEHYDRATION: ICD-10-CM

## 2024-12-27 LAB
ALBUMIN SERPL ELPH-MCNC: 4.9 G/DL — SIGNIFICANT CHANGE UP (ref 3.5–5.2)
ALP SERPL-CCNC: 101 U/L — SIGNIFICANT CHANGE UP (ref 30–115)
ALT FLD-CCNC: 35 U/L — SIGNIFICANT CHANGE UP (ref 0–41)
ANION GAP SERPL CALC-SCNC: 12 MMOL/L — SIGNIFICANT CHANGE UP (ref 7–14)
AST SERPL-CCNC: 18 U/L — SIGNIFICANT CHANGE UP (ref 0–41)
BASOPHILS # BLD AUTO: 0.05 K/UL — SIGNIFICANT CHANGE UP (ref 0–0.2)
BASOPHILS NFR BLD AUTO: 0.4 % — SIGNIFICANT CHANGE UP (ref 0–1)
BILIRUB SERPL-MCNC: 0.7 MG/DL — SIGNIFICANT CHANGE UP (ref 0.2–1.2)
BUN SERPL-MCNC: 19 MG/DL — SIGNIFICANT CHANGE UP (ref 10–20)
CALCIUM SERPL-MCNC: 9.8 MG/DL — SIGNIFICANT CHANGE UP (ref 8.4–10.5)
CHLORIDE SERPL-SCNC: 100 MMOL/L — SIGNIFICANT CHANGE UP (ref 98–110)
CO2 SERPL-SCNC: 26 MMOL/L — SIGNIFICANT CHANGE UP (ref 17–32)
CREAT SERPL-MCNC: 0.8 MG/DL — SIGNIFICANT CHANGE UP (ref 0.7–1.5)
EGFR: 104 ML/MIN/1.73M2 — SIGNIFICANT CHANGE UP
EOSINOPHIL # BLD AUTO: 0.01 K/UL — SIGNIFICANT CHANGE UP (ref 0–0.7)
EOSINOPHIL NFR BLD AUTO: 0.1 % — SIGNIFICANT CHANGE UP (ref 0–8)
GLUCOSE SERPL-MCNC: 96 MG/DL — SIGNIFICANT CHANGE UP (ref 70–99)
HCG SERPL QL: NEGATIVE — SIGNIFICANT CHANGE UP
HCT VFR BLD CALC: 40.2 % — SIGNIFICANT CHANGE UP (ref 37–47)
HGB BLD-MCNC: 13.1 G/DL — SIGNIFICANT CHANGE UP (ref 12–16)
IMM GRANULOCYTES NFR BLD AUTO: 0.4 % — HIGH (ref 0.1–0.3)
LYMPHOCYTES # BLD AUTO: 18.7 % — LOW (ref 20.5–51.1)
LYMPHOCYTES # BLD AUTO: 2.26 K/UL — SIGNIFICANT CHANGE UP (ref 1.2–3.4)
MAGNESIUM SERPL-MCNC: 2.2 MG/DL — SIGNIFICANT CHANGE UP (ref 1.8–2.4)
MCHC RBC-ENTMCNC: 27.1 PG — SIGNIFICANT CHANGE UP (ref 27–31)
MCHC RBC-ENTMCNC: 32.6 G/DL — SIGNIFICANT CHANGE UP (ref 32–37)
MCV RBC AUTO: 83.2 FL — SIGNIFICANT CHANGE UP (ref 81–99)
MONOCYTES # BLD AUTO: 1.17 K/UL — HIGH (ref 0.1–0.6)
MONOCYTES NFR BLD AUTO: 9.7 % — HIGH (ref 1.7–9.3)
NEUTROPHILS # BLD AUTO: 8.55 K/UL — HIGH (ref 1.4–6.5)
NEUTROPHILS NFR BLD AUTO: 70.7 % — SIGNIFICANT CHANGE UP (ref 42.2–75.2)
NRBC # BLD: 0 /100 WBCS — SIGNIFICANT CHANGE UP (ref 0–0)
PLATELET # BLD AUTO: 332 K/UL — SIGNIFICANT CHANGE UP (ref 130–400)
PMV BLD: 9.1 FL — SIGNIFICANT CHANGE UP (ref 7.4–10.4)
POTASSIUM SERPL-MCNC: 4.5 MMOL/L — SIGNIFICANT CHANGE UP (ref 3.5–5)
POTASSIUM SERPL-SCNC: 4.5 MMOL/L — SIGNIFICANT CHANGE UP (ref 3.5–5)
PROT SERPL-MCNC: 7.8 G/DL — SIGNIFICANT CHANGE UP (ref 6–8)
RBC # BLD: 4.83 M/UL — SIGNIFICANT CHANGE UP (ref 4.2–5.4)
RBC # FLD: 14.3 % — SIGNIFICANT CHANGE UP (ref 11.5–14.5)
SODIUM SERPL-SCNC: 138 MMOL/L — SIGNIFICANT CHANGE UP (ref 135–146)
WBC # BLD: 12.09 K/UL — HIGH (ref 4.8–10.8)
WBC # FLD AUTO: 12.09 K/UL — HIGH (ref 4.8–10.8)

## 2024-12-27 PROCEDURE — 83735 ASSAY OF MAGNESIUM: CPT

## 2024-12-27 PROCEDURE — 36415 COLL VENOUS BLD VENIPUNCTURE: CPT

## 2024-12-27 PROCEDURE — 80053 COMPREHEN METABOLIC PANEL: CPT

## 2024-12-27 PROCEDURE — 85025 COMPLETE CBC W/AUTO DIFF WBC: CPT

## 2024-12-27 PROCEDURE — 84703 CHORIONIC GONADOTROPIN ASSAY: CPT

## 2024-12-27 PROCEDURE — 93010 ELECTROCARDIOGRAM REPORT: CPT

## 2024-12-27 PROCEDURE — 93005 ELECTROCARDIOGRAM TRACING: CPT

## 2024-12-27 PROCEDURE — 36000 PLACE NEEDLE IN VEIN: CPT

## 2024-12-27 PROCEDURE — 99283 EMERGENCY DEPT VISIT LOW MDM: CPT | Mod: 25

## 2024-12-27 PROCEDURE — 99284 EMERGENCY DEPT VISIT MOD MDM: CPT

## 2024-12-27 RX ORDER — 0.9 % SODIUM CHLORIDE 0.9 %
2000 INTRAVENOUS SOLUTION INTRAVENOUS ONCE
Refills: 0 | Status: COMPLETED | OUTPATIENT
Start: 2024-12-27 | End: 2024-12-27

## 2024-12-27 RX ADMIN — Medication 2000 MILLILITER(S): at 17:19

## 2024-12-27 NOTE — ED PROVIDER NOTE - OBJECTIVE STATEMENT
Patient sent by PMD for IV fluids, was withdrawing from heroin, given Subutex but made her feel worse , C/o no appetite, has not been eating or drinking

## 2024-12-27 NOTE — ED ADULT NURSE NOTE - NSFALLUNIVINTERV_ED_ALL_ED
Bed/Stretcher in lowest position, wheels locked, appropriate side rails in place/Call bell, personal items and telephone in reach/Instruct patient to call for assistance before getting out of bed/chair/stretcher/Non-slip footwear applied when patient is off stretcher/[place] to call system/Physically safe environment - no spills, clutter or unnecessary equipment/Purposeful proactive rounding/Room/bathroom lighting operational, light cord in reach

## 2024-12-27 NOTE — ED PROVIDER NOTE - DISPOSITION TYPE
DISCHARGE
Modify Regimen: \\n- Clindamycin-BP gel spot treat \\n- Tretinoin 0.025% to entire face TIW HS. Mix with moisturizer. Increase as tolerated
Detail Level: Zone
Render In Strict Bullet Format?: No

## 2024-12-27 NOTE — ED ADULT NURSE NOTE - CHIEF COMPLAINT QUOTE
pt states "I relapsed on Heroin, last used this morning. I haven't ate or drank in 5 days". c/o dizziness x2days. denies SI/HI

## 2024-12-27 NOTE — ED ADULT TRIAGE NOTE - CHIEF COMPLAINT QUOTE
pt states "I relapsed on Heroin, last used this morning. I haven't ate or drank in 5 days". c/o dizziness x2days pt states "I relapsed on Heroin, last used this morning. I haven't ate or drank in 5 days". c/o dizziness x2days. denies SI/HI

## 2024-12-27 NOTE — ED PROVIDER NOTE - CLINICAL SUMMARY MEDICAL DECISION MAKING FREE TEXT BOX
29-year-old female, no past medical history, active heroin user, presenting for oral intake.  She states that she is due to start methadone with her doctor in 3 days but her doctor sent her into the ED for IV fluids.  She states that she has had poor oral intake for the last 5 days because she has had no appetite.  She says this is a difficult time a year for her because it is very stressful but she wants to start the methadone therapy.  Her doctor gave her Subutex yesterday which made her sicker but today she feels better.  Denies fevers, chills, chest pain, shortness of breath, nausea, vomiting, abdominal pain, diarrhea. Labs were ordered and reviewed. 29-year-old female, no past medical history, active heroin user, presenting for oral intake.  She states that she is due to start methadone with her doctor in 3 days but her doctor sent her into the ED for IV fluids.  She states that she has had poor oral intake for the last 5 days because she has had no appetite.  She says this is a difficult time a year for her because it is very stressful but she wants to start the methadone therapy.  Her doctor gave her Subutex yesterday which made her sicker but today she feels better.  Denies fevers, chills, chest pain, shortness of breath, nausea, vomiting, abdominal pain, diarrhea. Labs were ordered and reviewed.  discussed all results.  Given return precautions and follow up outpatient.  Patient comfortable with plan.

## 2024-12-27 NOTE — ED PROVIDER NOTE - ATTENDING APP SHARED VISIT CONTRIBUTION OF CARE
29-year-old female, no past medical history, active heroin user, presenting for oral intake.  She states that she is due to start methadone with her doctor in 3 days but her doctor sent her into the ED for IV fluids.  She states that she has had poor oral intake for the last 5 days because she has had no appetite.  She says this is a difficult time a year for her because it is very stressful but she wants to start the methadone therapy.  Her doctor gave her Subutex yesterday which made her sicker but today she feels better.  Denies fevers, chills, chest pain, shortness of breath, nausea, vomiting, abdominal pain, diarrhea.    CONSTITUTIONAL: Well-developed; well-nourished; in no acute distress.   SKIN: warm, dry  HEAD: Normocephalic; atraumatic.  EYES: PERRL, EOMI, no conjunctival erythema  ENT: No nasal discharge; airway clear. dry mucous membranes  NECK: Supple; non tender.  CARD: S1, S2 normal;  Regular rate and rhythm.   RESP: No wheezes, rales or rhonchi. no increased respiratory effort  ABD: soft ntnd  EXT: Normal ROM.  No clubbing, cyanosis or edema.   NEURO: Alert, oriented, grossly unremarkable. no tremors  PSYCH: Cooperative, appropriate.

## 2024-12-27 NOTE — ED PROVIDER NOTE - NSFOLLOWUPINSTRUCTIONS_ED_ALL_ED_FT
Please follow up with your doctor.    Dehydration, Adult  Dehydration is a condition in which there is not enough water or other fluids in the body. This happens when a person loses more fluids than they take in. Important organs, such as the kidneys, brain, and heart, cannot function without a proper amount of fluids. Any loss of fluids from the body can lead to dehydration.    Dehydration can be mild, moderate, or severe. It should be treated right away to prevent it from becoming severe.    What are the causes?  Dehydration may be caused by:  Health conditions, such as diarrhea, vomiting, fever, infection, or sweating or urinating a lot.  Not drinking enough fluids.  Certain medicines, such as medicines that remove excess fluid from the body (diuretics).  Lack of safe drinking water.  Not being able to get enough water and food.  What increases the risk?  The following factors may make you more likely to develop this condition:  Having a long-term (chronic) illness that has not been treated properly, such as diabetes, heart disease, or kidney disease.  Being 65 years of age or older.  Having a disability.  Living in a place that is high in altitude, where thinner, drier air causes more fluid loss.  Doing exercises that put stress on your body for a long time (endurance sports).  Being active in a hot climate.  What are the signs or symptoms?  Symptoms of dehydration depend on how severe it is.    Mild or moderate dehydration    Thirst.  Dry lips or dry mouth.  Dizziness or light-headedness.  Muscle cramps.  Dark urine. Urine may be the color of tea.  Less urine or tears produced than usual.  Headache.  Severe dehydration    Changes in skin. Your skin may be cold and clammy, blotchy, or pale. Your skin also may not return to normal after being lightly pinched and released.  Little or no tears, urine, or sweat.  Rapid breathing and low blood pressure. Your pulse may be weak or may be faster than 100 beats per minute when you are sitting still.  Other changes, such as:  Feeling very thirsty.  Sunken eyes.  Cold hands and feet.  Confusion.  Being very tired (lethargic) or having trouble waking from sleep.  Short-term weight loss.  Loss of consciousness.  How is this diagnosed?  This condition is diagnosed based on your symptoms and a physical exam. You may have blood and urine tests to help confirm the diagnosis.    How is this treated?  A person's hand, showing an inserted IV catheter.   Treatment for this condition depends on how severe it is. Treatment should be started right away. Do not wait until dehydration becomes severe. Severe dehydration is an emergency and needs to be treated in a hospital.  Mild or moderate dehydration can be treated at home. You may be asked to:  Drink more fluids.  Drink an oral rehydration solution (ORS). This drink restores fluids, salts, and minerals in the blood (electrolytes).  Stop any activities that caused dehydration, such as exercise.  Cool off with cool compresses, cool mist, or cool fluids, if heat or too much sweat caused your condition.  Take medicine to treat fever, if fever caused your condition.  Take medicine to treat nausea and diarrhea, if vomiting or diarrhea caused your condition.  Severe dehydration can be treated:  With IV fluids.  By correcting abnormal levels of electrolytes in your body.  By treating the underlying cause of dehydration.  Follow these instructions at home:  Oral rehydration solution    A glass of water with a spoonful of ORS ready to be added to it.  If told by your health care provider, drink an ORS:  Make an ORS by following instructions on the package.  Start by drinking small amounts, about ½ cup (120 mL) every 5–10 minutes.  Slowly increase how much you drink until you have taken the amount recommended by your health care provider.  Eating and drinking    A person drinking water from a glass.   Drink enough clear fluid to keep your urine pale yellow. If you were told to drink an ORS, finish the ORS first and then start slowly drinking other clear fluids. Drink fluids such as:  Water. Do not drink only water. Doing that can lead to hyponatremia, which is having too little salt (sodium) in the body.  Water from ice chips you suck on.  Diluted fruit juice. This is fruit juice that you have added water to.  Low-calorie sports drinks.  Eat foods that contain a healthy balance of electrolytes, such as bananas, oranges, potatoes, tomatoes, and spinach.  Do not drink alcohol.  Avoid the following:  Drinks that contain a lot of sugar. These include high-calorie sports drinks, fruit juice that is not diluted, and soda.  Caffeine.  Foods that are greasy or contain a lot of fat or sugar.  General instructions    Take over-the-counter and prescription medicines only as told by your health care provider.  Do not take sodium tablets. Doing that can lead to having too much sodium in the body (hypernatremia).  Return to your normal activities as told by your health care provider. Ask your health care provider what activities are safe for you.  Keep all follow-up visits. Your health care provider may need to check your progress and suggest new ways to treat your condition.  Contact a health care provider if:  You have muscle cramps, pain, or discomfort, such as:  Pain in your abdomen and the pain gets worse or stays in one area.  Stiff neck.  You have a rash.  You are more irritable than usual.  You are sleepier or have a harder time waking.  You feel weak or dizzy.  You feel very thirsty.  Get help right away if:  You have symptoms of severe dehydration.  You vomit every time you eat or drink.  Your vomiting gets worse, does not go away, or includes blood or green matter (bile).  You are getting treatment but symptoms are getting worse.  You have a fever.  You have a severe headache.  You have:  Diarrhea that gets worse or does not go away.  Blood in your stool. This may cause stool to look black and tarry.  Not urinating, or urinating only a small amount of very dark urine, within 6–8 hours.  You have trouble breathing.  These symptoms may be an emergency. Get help right away.  Do not wait to see if the symptoms will go away.  Do not drive yourself to the hospital. Call 911.  This information is not intended to replace advice given to you by your health care provider. Make sure you discuss any questions you have with your health care provider.

## 2024-12-27 NOTE — ED ADULT TRIAGE NOTE - ACCOMPANIED BY
[facility]  [address] [address]  Phone:  [phone]                                    Bernabe Garnica   MRN: [id number]    Department:  [facility]   Date of Visit:  4/1/2019           After Visit Summary Signature Page    I have received my discharge instructions, and my questions have been answered. I have discussed any challenges I see with this plan with the nurse or doctor.    ..........................................................................................................................................  Patient/Patient Representative Signature      ..........................................................................................................................................  Patient Representative Print Name and Relationship to Patient    ..................................................               ................................................  Date                                   Time    ..........................................................................................................................................  Reviewed by Signature/Title    ...................................................              ..............................................  Date                                               Time          22EPIC Rev 08/18       
Self

## 2024-12-27 NOTE — ED PROVIDER NOTE - PATIENT PORTAL LINK FT
You can access the FollowMyHealth Patient Portal offered by [facility] by registering at the following website: [email or web address]. By joining [employer]’s FollowMyHealth portal, you will also be able to view your health information using other applications (apps) compatible with our system.

## 2025-01-08 ENCOUNTER — EMERGENCY (EMERGENCY)
Facility: HOSPITAL | Age: 30
LOS: 0 days | Discharge: HOME | End: 2025-01-09
Attending: EMERGENCY MEDICINE
Payer: MEDICAID

## 2025-01-08 VITALS — HEART RATE: 67 BPM | HEIGHT: 64 IN | OXYGEN SATURATION: 96 % | RESPIRATION RATE: 18 BRPM

## 2025-01-08 DIAGNOSIS — F19.120 OTHER PSYCHOACTIVE SUBSTANCE ABUSE WITH INTOXICATION, UNCOMPLICATED: ICD-10-CM

## 2025-01-08 DIAGNOSIS — F19.10 OTHER PSYCHOACTIVE SUBSTANCE ABUSE, UNCOMPLICATED: ICD-10-CM

## 2025-01-08 DIAGNOSIS — Z88.8 ALLERGY STATUS TO OTHER DRUGS, MEDICAMENTS AND BIOLOGICAL SUBSTANCES: ICD-10-CM

## 2025-01-08 PROCEDURE — 99284 EMERGENCY DEPT VISIT MOD MDM: CPT

## 2025-01-08 PROCEDURE — 99285 EMERGENCY DEPT VISIT HI MDM: CPT

## 2025-01-08 RX ORDER — IBUPROFEN 200 MG
600 TABLET ORAL ONCE
Refills: 0 | Status: COMPLETED | OUTPATIENT
Start: 2025-01-08 | End: 2025-01-08

## 2025-01-08 RX ORDER — ONDANSETRON 4 MG/1
4 TABLET ORAL ONCE
Refills: 0 | Status: COMPLETED | OUTPATIENT
Start: 2025-01-08 | End: 2025-01-08

## 2025-01-08 NOTE — ED ADULT TRIAGE NOTE - NS ED TRIAGE AVPU SCALE
Breath sounds clear and equal bilaterally.
Verbal - The patient responds to verbal stimuli by opening their eyes when someone speaks to them. The patient is not fully oriented to time, place, or person.

## 2025-01-09 VITALS — DIASTOLIC BLOOD PRESSURE: 68 MMHG | SYSTOLIC BLOOD PRESSURE: 137 MMHG

## 2025-01-09 RX ADMIN — ONDANSETRON 4 MILLIGRAM(S): 4 TABLET ORAL at 00:16

## 2025-01-09 RX ADMIN — Medication 600 MILLIGRAM(S): at 00:16

## 2025-01-09 NOTE — ED ADULT NURSE NOTE - NSFALLUNIVINTERV_ED_ALL_ED
Bed/Stretcher in lowest position, wheels locked, appropriate side rails in place/Call bell, personal items and telephone in reach/Instruct patient to call for assistance before getting out of bed/chair/stretcher/Non-slip footwear applied when patient is off stretcher/[place] to call system/Physically safe environment - no spills, clutter or unnecessary equipment/Purposeful proactive rounding/Room/bathroom lighting operational, light cord in reach

## 2025-01-09 NOTE — ED PROVIDER NOTE - ATTENDING APP SHARED VISIT CONTRIBUTION OF CARE
30 yo sts she was given narcan by her father after doing heroin bc he thought she OD'd. pt sts she feels well. denies si hi. is an IVDA. no fevers. no pain.

## 2025-01-09 NOTE — ED PROVIDER NOTE - PATIENT PORTAL LINK FT
You can access the FollowMyHealth Patient Portal offered by [facility] by registering at the following website: [email or web address]. By joining [employer]’s FollowMyHealth portal, you will also be able to view your health information using other applications (apps) compatible with our system.

## 2025-01-09 NOTE — ED PROVIDER NOTE - OBJECTIVE STATEMENT
29-year-old female with heroin use, polysubstance use, presents with complaint of intoxication. Reports at 8pm used 4 bags heroin.  States her father had to give her intranasal narcan. Reports she had nausea earlier, but not any longer. Denies other complaints. Denies SI/HI.

## 2025-01-14 PROBLEM — K08.9 DISORDER OF TEETH AND SUPPORTING STRUCTURES, UNSPECIFIED: Chronic | Status: INACTIVE | Noted: 2018-03-04 | Resolved: 2020-08-04

## 2025-03-07 ENCOUNTER — OUTPATIENT (OUTPATIENT)
Dept: OUTPATIENT SERVICES | Facility: HOSPITAL | Age: 30
LOS: 1 days | End: 2025-03-07
Payer: MEDICAID

## 2025-03-07 ENCOUNTER — OUTPATIENT (OUTPATIENT)
Dept: OUTPATIENT SERVICES | Facility: HOSPITAL | Age: 30
LOS: 1 days | End: 2025-03-07

## 2025-03-07 DIAGNOSIS — I49.9 CARDIAC ARRHYTHMIA, UNSPECIFIED: ICD-10-CM

## 2025-03-07 DIAGNOSIS — Z00.8 ENCOUNTER FOR OTHER GENERAL EXAMINATION: ICD-10-CM

## 2025-03-07 PROBLEM — Z78.9 OTHER SPECIFIED HEALTH STATUS: Chronic | Status: ACTIVE | Noted: 2019-12-10

## 2025-03-07 PROCEDURE — 93005 ELECTROCARDIOGRAM TRACING: CPT

## 2025-03-07 PROCEDURE — 93010 ELECTROCARDIOGRAM REPORT: CPT

## 2025-03-08 DIAGNOSIS — Z00.8 ENCOUNTER FOR OTHER GENERAL EXAMINATION: ICD-10-CM

## 2025-03-08 DIAGNOSIS — I49.9 CARDIAC ARRHYTHMIA, UNSPECIFIED: ICD-10-CM

## 2025-03-28 ENCOUNTER — OUTPATIENT (OUTPATIENT)
Dept: OUTPATIENT SERVICES | Facility: HOSPITAL | Age: 30
LOS: 1 days | End: 2025-03-28

## 2025-03-28 DIAGNOSIS — Z00.8 ENCOUNTER FOR OTHER GENERAL EXAMINATION: ICD-10-CM

## 2025-03-29 DIAGNOSIS — Z00.8 ENCOUNTER FOR OTHER GENERAL EXAMINATION: ICD-10-CM

## 2025-04-04 ENCOUNTER — OUTPATIENT (OUTPATIENT)
Dept: OUTPATIENT SERVICES | Facility: HOSPITAL | Age: 30
LOS: 1 days | End: 2025-04-04
Payer: MEDICAID

## 2025-04-04 DIAGNOSIS — Z00.8 ENCOUNTER FOR OTHER GENERAL EXAMINATION: ICD-10-CM

## 2025-04-04 LAB
A1C WITH ESTIMATED AVERAGE GLUCOSE RESULT: 5 % — SIGNIFICANT CHANGE UP (ref 4–5.6)
ALBUMIN SERPL ELPH-MCNC: 4.2 G/DL — SIGNIFICANT CHANGE UP (ref 3.5–5.2)
ALP SERPL-CCNC: 80 U/L — SIGNIFICANT CHANGE UP (ref 30–115)
ALT FLD-CCNC: 13 U/L — SIGNIFICANT CHANGE UP (ref 0–41)
ANION GAP SERPL CALC-SCNC: 12 MMOL/L — SIGNIFICANT CHANGE UP (ref 7–14)
AST SERPL-CCNC: 16 U/L — SIGNIFICANT CHANGE UP (ref 0–41)
BILIRUB SERPL-MCNC: 0.2 MG/DL — SIGNIFICANT CHANGE UP (ref 0.2–1.2)
BUN SERPL-MCNC: 13 MG/DL — SIGNIFICANT CHANGE UP (ref 10–20)
CALCIUM SERPL-MCNC: 9.1 MG/DL — SIGNIFICANT CHANGE UP (ref 8.4–10.5)
CHLORIDE SERPL-SCNC: 100 MMOL/L — SIGNIFICANT CHANGE UP (ref 98–110)
CHOLEST SERPL-MCNC: 198 MG/DL — SIGNIFICANT CHANGE UP
CO2 SERPL-SCNC: 24 MMOL/L — SIGNIFICANT CHANGE UP (ref 17–32)
CREAT SERPL-MCNC: 0.6 MG/DL — LOW (ref 0.7–1.5)
EGFR: 126 ML/MIN/1.73M2 — SIGNIFICANT CHANGE UP
EGFR: 126 ML/MIN/1.73M2 — SIGNIFICANT CHANGE UP
ESTIMATED AVERAGE GLUCOSE: 97 MG/DL — SIGNIFICANT CHANGE UP (ref 68–114)
GLUCOSE SERPL-MCNC: 72 MG/DL — SIGNIFICANT CHANGE UP (ref 70–99)
HCG UR QL: NEGATIVE — SIGNIFICANT CHANGE UP
HCT VFR BLD CALC: 41.7 % — SIGNIFICANT CHANGE UP (ref 37–47)
HDLC SERPL-MCNC: 42 MG/DL — LOW
HGB BLD-MCNC: 13 G/DL — SIGNIFICANT CHANGE UP (ref 12–16)
LDLC SERPL-MCNC: 127 MG/DL — HIGH
LIPID PNL WITH DIRECT LDL SERPL: 127 MG/DL — HIGH
MAGNESIUM SERPL-MCNC: 1.7 MG/DL — LOW (ref 1.8–2.4)
MCHC RBC-ENTMCNC: 28.3 PG — SIGNIFICANT CHANGE UP (ref 27–31)
MCHC RBC-ENTMCNC: 31.2 G/DL — LOW (ref 32–37)
MCV RBC AUTO: 90.8 FL — SIGNIFICANT CHANGE UP (ref 81–99)
NONHDLC SERPL-MCNC: 156 MG/DL — HIGH
NRBC BLD AUTO-RTO: 0 /100 WBCS — SIGNIFICANT CHANGE UP (ref 0–0)
PLATELET # BLD AUTO: 250 K/UL — SIGNIFICANT CHANGE UP (ref 130–400)
PMV BLD: 11.1 FL — HIGH (ref 7.4–10.4)
POTASSIUM SERPL-MCNC: 4.8 MMOL/L — SIGNIFICANT CHANGE UP (ref 3.5–5)
POTASSIUM SERPL-SCNC: 4.8 MMOL/L — SIGNIFICANT CHANGE UP (ref 3.5–5)
PROT SERPL-MCNC: 6.9 G/DL — SIGNIFICANT CHANGE UP (ref 6–8)
RBC # BLD: 4.59 M/UL — SIGNIFICANT CHANGE UP (ref 4.2–5.4)
RBC # FLD: 13.1 % — SIGNIFICANT CHANGE UP (ref 11.5–14.5)
SODIUM SERPL-SCNC: 136 MMOL/L — SIGNIFICANT CHANGE UP (ref 135–146)
TRIGL SERPL-MCNC: 163 MG/DL — HIGH
WBC # BLD: 5.75 K/UL — SIGNIFICANT CHANGE UP (ref 4.8–10.8)
WBC # FLD AUTO: 5.75 K/UL — SIGNIFICANT CHANGE UP (ref 4.8–10.8)

## 2025-04-04 PROCEDURE — 80053 COMPREHEN METABOLIC PANEL: CPT

## 2025-04-04 PROCEDURE — 85027 COMPLETE CBC AUTOMATED: CPT

## 2025-04-04 PROCEDURE — 86780 TREPONEMA PALLIDUM: CPT

## 2025-04-04 PROCEDURE — 81001 URINALYSIS AUTO W/SCOPE: CPT

## 2025-04-04 PROCEDURE — 86480 TB TEST CELL IMMUN MEASURE: CPT

## 2025-04-04 PROCEDURE — 83036 HEMOGLOBIN GLYCOSYLATED A1C: CPT

## 2025-04-04 PROCEDURE — 80061 LIPID PANEL: CPT

## 2025-04-04 PROCEDURE — 80074 ACUTE HEPATITIS PANEL: CPT

## 2025-04-04 PROCEDURE — 36415 COLL VENOUS BLD VENIPUNCTURE: CPT

## 2025-04-04 PROCEDURE — 83735 ASSAY OF MAGNESIUM: CPT

## 2025-04-04 PROCEDURE — 81025 URINE PREGNANCY TEST: CPT

## 2025-04-05 DIAGNOSIS — Z00.8 ENCOUNTER FOR OTHER GENERAL EXAMINATION: ICD-10-CM

## 2025-04-05 LAB
APPEARANCE UR: ABNORMAL
BACTERIA # UR AUTO: ABNORMAL /HPF
BILIRUB UR-MCNC: NEGATIVE — SIGNIFICANT CHANGE UP
CAST: 6 /LPF — HIGH (ref 0–4)
COLOR SPEC: YELLOW — SIGNIFICANT CHANGE UP
DIFF PNL FLD: NEGATIVE — SIGNIFICANT CHANGE UP
GLUCOSE UR QL: NEGATIVE MG/DL — SIGNIFICANT CHANGE UP
HAV IGM SER-ACNC: SIGNIFICANT CHANGE UP
HBV CORE IGM SER-ACNC: SIGNIFICANT CHANGE UP
HBV SURFACE AG SER-ACNC: SIGNIFICANT CHANGE UP
HCV AB S/CO SERPL IA: 0.15 S/CO — SIGNIFICANT CHANGE UP (ref 0–0.79)
HCV AB SERPL-IMP: SIGNIFICANT CHANGE UP
KETONES UR-MCNC: NEGATIVE MG/DL — SIGNIFICANT CHANGE UP
LEUKOCYTE ESTERASE UR-ACNC: ABNORMAL
MUCOUS THREADS # UR AUTO: PRESENT
NITRITE UR-MCNC: NEGATIVE — SIGNIFICANT CHANGE UP
PH UR: 5.5 — SIGNIFICANT CHANGE UP (ref 5–8)
PROT UR-MCNC: NEGATIVE MG/DL — SIGNIFICANT CHANGE UP
RBC CASTS # UR COMP ASSIST: 0 /HPF — SIGNIFICANT CHANGE UP (ref 0–4)
REVIEW: SIGNIFICANT CHANGE UP
SP GR SPEC: 1.01 — SIGNIFICANT CHANGE UP (ref 1–1.03)
SQUAMOUS # UR AUTO: 35 /HPF — HIGH (ref 0–5)
T PALLIDUM AB TITR SER: NEGATIVE — SIGNIFICANT CHANGE UP
UROBILINOGEN FLD QL: 0.2 MG/DL — SIGNIFICANT CHANGE UP (ref 0.2–1)
WBC UR QL: 6 /HPF — HIGH (ref 0–5)

## 2025-04-09 LAB
GAMMA INTERFERON BACKGROUND BLD IA-ACNC: 0.07 IU/ML — SIGNIFICANT CHANGE UP
M TB IFN-G BLD-IMP: NEGATIVE — SIGNIFICANT CHANGE UP
M TB IFN-G CD4+ BCKGRND COR BLD-ACNC: 0 IU/ML — SIGNIFICANT CHANGE UP
M TB IFN-G CD4+CD8+ BCKGRND COR BLD-ACNC: 0 IU/ML — SIGNIFICANT CHANGE UP
QUANT TB PLUS MITOGEN MINUS NIL: >10 IU/ML — SIGNIFICANT CHANGE UP

## 2025-04-18 ENCOUNTER — OUTPATIENT (OUTPATIENT)
Dept: OUTPATIENT SERVICES | Facility: HOSPITAL | Age: 30
LOS: 1 days | End: 2025-04-18
Payer: MEDICAID

## 2025-04-18 DIAGNOSIS — I49.9 CARDIAC ARRHYTHMIA, UNSPECIFIED: ICD-10-CM

## 2025-04-18 PROCEDURE — 93005 ELECTROCARDIOGRAM TRACING: CPT

## 2025-04-18 PROCEDURE — 93010 ELECTROCARDIOGRAM REPORT: CPT

## 2025-04-19 DIAGNOSIS — I49.9 CARDIAC ARRHYTHMIA, UNSPECIFIED: ICD-10-CM
